# Patient Record
Sex: MALE | Race: WHITE | NOT HISPANIC OR LATINO | Employment: OTHER | URBAN - METROPOLITAN AREA
[De-identification: names, ages, dates, MRNs, and addresses within clinical notes are randomized per-mention and may not be internally consistent; named-entity substitution may affect disease eponyms.]

---

## 2017-01-04 ENCOUNTER — TELEPHONE (OUTPATIENT)
Dept: ENDOCRINOLOGY | Age: 52
End: 2017-01-04

## 2017-01-30 ENCOUNTER — TELEPHONE (OUTPATIENT)
Dept: ENDOCRINOLOGY | Age: 52
End: 2017-01-30

## 2017-01-30 ENCOUNTER — OFFICE VISIT (OUTPATIENT)
Dept: ENDOCRINOLOGY | Age: 52
End: 2017-01-30

## 2017-01-30 VITALS
HEIGHT: 73 IN | SYSTOLIC BLOOD PRESSURE: 130 MMHG | BODY MASS INDEX: 39.42 KG/M2 | DIASTOLIC BLOOD PRESSURE: 74 MMHG | WEIGHT: 297.4 LBS

## 2017-01-30 DIAGNOSIS — E29.1 HYPOGONADISM IN MALE: ICD-10-CM

## 2017-01-30 DIAGNOSIS — E78.00 HYPERCHOLESTEROLEMIA: ICD-10-CM

## 2017-01-30 DIAGNOSIS — R79.89 LOW TESTOSTERONE: ICD-10-CM

## 2017-01-30 DIAGNOSIS — E78.5 HYPERLIPIDEMIA, UNSPECIFIED HYPERLIPIDEMIA TYPE: ICD-10-CM

## 2017-01-30 DIAGNOSIS — E03.9 PRIMARY HYPOTHYROIDISM: Primary | ICD-10-CM

## 2017-01-30 PROCEDURE — 99214 OFFICE O/P EST MOD 30 MIN: CPT | Performed by: NURSE PRACTITIONER

## 2017-01-30 RX ORDER — GLIPIZIDE 5 MG/1
5 TABLET ORAL DAILY
Qty: 30 TABLET | Refills: 5 | Status: SHIPPED | OUTPATIENT
Start: 2017-01-30 | End: 2017-03-17 | Stop reason: SDUPTHER

## 2017-01-30 RX ORDER — PEN NEEDLE, DIABETIC 29 G X1/2"
NEEDLE, DISPOSABLE MISCELLANEOUS
COMMUNITY
Start: 2017-01-26 | End: 2017-08-07 | Stop reason: SDUPTHER

## 2017-01-30 RX ORDER — LEVOTHYROXINE SODIUM 0.07 MG/1
75 TABLET ORAL DAILY
COMMUNITY
Start: 2017-01-05 | End: 2017-06-06

## 2017-01-30 RX ORDER — TESTOSTERONE 16.2 MG/G
GEL TRANSDERMAL
COMMUNITY
Start: 2017-01-02 | End: 2017-02-03 | Stop reason: SDUPTHER

## 2017-01-30 NOTE — MR AVS SNAPSHOT
Chetan Grimm   1/30/2017 9:00 AM   Office Visit    Dept Phone:  [phone]   Encounter #:  [id number]    Provider:  DEREK Kapoor   Department:  [facility] ENDOCRINOLOGY                Your Full Care Plan              Today's Medication Changes          These changes are accurate as of: 1/30/17  9:51 AM.  If you have any questions, ask your nurse or doctor.               New Medication(s)Ordered:     glucagon 1 MG injection   Commonly known as:  GLUCAGON EMERGENCY   Inject 1 mg under the skin 1 (One) Time As Needed for low blood sugar for up to 1 dose.   Started by:  DEREK Kapoor         Medication(s)that have changed:     ANDROGEL PUMP 20.25 MG/ACT (1.62%) gel   Generic drug:  Testosterone   1 pump to each shoulder daily   What changed:  Another medication with the same name was removed. Continue taking this medication, and follow the directions you see here.   Changed by:  DEREK Kapoor       glipiZIDE 5 MG tablet   Commonly known as:  GLUCOTROL   Take 1 tablet by mouth Daily.   What changed:  when to take this   Changed by:  DEREK Kapoor       levothyroxine 75 MCG tablet   Commonly known as:  SYNTHROID, LEVOTHROID   Take 75 mcg by mouth Daily.   What changed:  Another medication with the same name was removed. Continue taking this medication, and follow the directions you see here.   Changed by:  DEREK Kapoor            Where to Get Your Medications      These medications were sent to [facility] [address] [place] [address] [id number] [place] [address] - [phone]  - [phone] [place] [address]     Phone:  [phone]     Dapagliflozin-Metformin HCl ER 5-1000 MG tablet sustained-release 24 hour    glipiZIDE 5 MG tablet    glucagon 1 MG injection                  Your Updated Medication List          This list is accurate as of: 1/30/17  9:51 AM.  Always use your most recent med list.                albuterol 108 (90 BASE) MCG/ACT inhaler   Commonly known as:  PROVENTIL HFA;VENTOLIN HFA   Inhale 2 puffs every 4 (four) hours as needed for wheezing.       ANDROGEL PUMP 20.25 MG/ACT (1.62%) gel   Generic drug:  Testosterone       aspirin 81 MG chewable tablet       atorvastatin 20 MG tablet   Commonly known as:  LIPITOR   Take 1 tablet by mouth Daily.       cyclobenzaprine 10 MG tablet   Commonly known as:  FLEXERIL       Dapagliflozin-Metformin HCl ER 5-1000 MG tablet sustained-release 24 hour   Commonly known as:  XIGDUO XR   Take 2 tablets by mouth Daily.       ergocalciferol 85653 UNITS capsule   Commonly known as:  DRISDOL   Take 1 po q week       freestyle lancets   Use to test BG 4 times daily       glipiZIDE 5 MG tablet   Commonly known as:  GLUCOTROL   Take 1 tablet by mouth Daily.       glucagon 1 MG injection   Commonly known as:  GLUCAGON EMERGENCY   Inject 1 mg under the skin 1 (One) Time As Needed for low blood sugar for up to 1 dose.       glucose blood test strip   Commonly known as:  FREESTYLE LITE   Use to test 4 times daily       Insulin Glargine 100 UNIT/ML injection pen   Commonly known as:  LANTUS SOLOSTAR   Inject 50 Units under the skin Daily.       l-methylfolate-B6-B12 3-35-2 MG tablet tablet   Take 1 tablet by mouth 2 (Two) Times a Day.       levothyroxine 75 MCG tablet   Commonly known as:  SYNTHROID, LEVOTHROID       lisinopril 5 MG tablet   Commonly known as:  PRINIVIL,ZESTRIL       mupirocin 2 % ointment   Commonly known as:  BACTROBAN       RELION PEN NEEDLES 29G X 12MM misc   Generic drug:  Insulin Pen Needle       TANZEUM 50 MG pen-injector   Generic drug:  Albiglutide   Inject 1 dose under the skin 1 (One) Time Per Week.               You Were Diagnosed With        Codes Comments    Primary hypothyroidism    -  Primary ICD-10-CM: E03.9  ICD-9-CM: 244.9     Hyperlipidemia, unspecified hyperlipidemia type     ICD-10-CM: E78.5  ICD-9-CM: 272.4     Vitamin D deficiency      ICD-10-CM: E55.9  ICD-9-CM: 268.9     Low testosterone     ICD-10-CM: E29.1  ICD-9-CM: 257.2     Hypogonadism in male     ICD-10-CM: E29.1  ICD-9-CM: 257.2     Hypercholesterolemia     ICD-10-CM: E78.00  ICD-9-CM: 272.0       Medications to be Given to You by a Medical Professional     Due       Frequency    (none) nitroglycerin (NITROSTAT) SL tablet 0.4 mg  Every 5 Minutes PRN      Instructions    Decrease glipizide to 5mg once daily   Glucagon kit sent to pharmacy for emergency   Schedule an appt with PCP  Continue to monitor BS. Notify the office if you continue to have high blood sugars or if you have low blood sugars so your medications can be adjusted   Derm referral from pcp     Patient Instructions History      Upcoming Appointments     Visit Type Date Time Department    OFFICE VISIT 2017  9:00 AM [facility]    FOLLOW UP SLEEP CLINIC 2017  2:30 PM [facility]    [facility] 2017  8:10 AM [facility]    OFFICE VISIT 2017  2:20 PM [facility]      [employer] Signup     [employer] allows you to send messages to your doctor, view your test results, renew your prescriptions, schedule appointments, and more. To sign up, go to [employer] and click on the Sign Up Now link in the New User? box. Enter your [employer] Activation Code exactly as it appears below along with the last four digits of your Social Security Number and your Date of Birth () to complete the sign-up process. If you do not sign up before the expiration date, you must request a new code.    [employer] Activation Code: [email or web address]  Expires: 2017  9:49 AM    If you have questions, you can email [email or web address] or call [phone] to talk to our [employer] staff. Remember, [employer] is NOT to be used for urgent needs. For medical emergencies, dial 911.               Other Info from Your Visit           Your Appointments     2017  2:30 PM EST   Follow Up Sleep  "Clinic with  [facility] PROVIDER SCHEDULE   [facility]    [address] [facility] [address] [phone]            1.  If you are currently on a CPAP or BiPAP please bring in your SD card or memory card.  2.  If you are experiencing problems with your current mask, please bring your mask with you to your appointment.              May 23, 2017  8:10 AM EDT   [facility]   [facility] ENDOCR[facility] (--)    [address] [address] [phone]            Jun 06, 2017  2:20 PM EDT   Office Visit with DEREK Kapoor   [facility] ENDOCRINOLOG[facility] (--)    [address] [address] [phone]           Arrive 15 minutes prior to appointment.              Allergies     Adhesive Tape        Reason for Visit     Diabetes patient experiencing a rash and low BG ranges 52  to 62    Hypothyroidism pt is not taking metanx    Hypogonadism           Vital Signs     Blood Pressure Height Weight Body Mass Index Smoking Status       130/74 73\" (185.4 cm) 297 lb 6.4 oz (135 kg) 39.24 kg/m2 Never Smoker       Problems and Diagnoses Noted     High cholesterol    High cholesterol or triglycerides    Hypogonadism in male    Low testosterone    Primary hypothyroidism    Vitamin D deficiency        "

## 2017-01-30 NOTE — PROGRESS NOTES
"Poppy Grimm is a 51 y.o. male is here today for follow-up.  Chief Complaint   Patient presents with   • Diabetes     patient experiencing a rash and low BG ranges 52  to 62   • Hypothyroidism     pt is not taking metanx   • Hypogonadism     Visit Vitals   • /74   • Ht 73\" (185.4 cm)   • Wt 297 lb 6.4 oz (135 kg)   • BMI 39.24 kg/m2       Current Outpatient Prescriptions:   •  albuterol (PROVENTIL HFA;VENTOLIN HFA) 108 (90 BASE) MCG/ACT inhaler, Inhale 2 puffs every 4 (four) hours as needed for wheezing., Disp: 1 inhaler, Rfl: 0  •  ANDROGEL PUMP 20.25 MG/ACT (1.62%) gel, 1 pump to each shoulder daily, Disp: , Rfl:   •  aspirin 81 MG chewable tablet, Chew 81 mg daily., Disp: , Rfl:   •  atorvastatin (LIPITOR) 20 MG tablet, Take 1 tablet by mouth Daily., Disp: 90 tablet, Rfl: 1  •  cyclobenzaprine (FLEXERIL) 10 MG tablet, Take 10 mg by mouth 2 (two) times a day as needed., Disp: , Rfl:   •  Dapagliflozin-Metformin HCl ER (XIGDUO XR) 5-1000 MG tablet sustained-release 24 hour, Take 2 tablets by mouth Daily., Disp: 180 tablet, Rfl: 1  •  ergocalciferol (DRISDOL) 05490 UNITS capsule, Take 1 po q week, Disp: 12 capsule, Rfl: 1  •  glipiZIDE (GLUCOTROL) 5 MG tablet, Take 1 tablet by mouth 2 (Two) Times a Day., Disp: 60 tablet, Rfl: 4  •  glucose blood (FREESTYLE LITE) test strip, Use to test 4 times daily, Disp: 200 each, Rfl: 5  •  Insulin Glargine (LANTUS SOLOSTAR) 100 UNIT/ML injection pen, Inject 50 Units under the skin Daily., Disp: 30 mL, Rfl: 5  •  Lancets (FREESTYLE) lancets, Use to test BG 4 times daily, Disp: 200 each, Rfl: 5  •  levothyroxine (SYNTHROID, LEVOTHROID) 75 MCG tablet, Take 75 mcg by mouth Daily., Disp: , Rfl:   •  lisinopril (PRINIVIL,ZESTRIL) 5 MG tablet, Take 5 mg by mouth every night., Disp: , Rfl:   •  mupirocin (BACTROBAN) 2 % ointment, Apply  topically 3 (three) times a day., Disp: , Rfl:   •  RELION PEN NEEDLES 29G X 12MM misc, Use to inject insulin 2 times, Disp: , " Rfl:   •  TANZEUM 50 MG pen-injector, Inject 1 dose under the skin 1 (One) Time Per Week., Disp: 4 each, Rfl: 5  •  l-methylfolate-B6-B12 (METANX) 3-35-2 MG tablet tablet, Take 1 tablet by mouth 2 (Two) Times a Day., Disp: 180 tablet, Rfl: 3    Current Facility-Administered Medications:   •  nitroglycerin (NITROSTAT) SL tablet 0.4 mg, 0.4 mg, Sublingual, Q5 Min PRN, Edmond Gordon MD  Social History     Social History   • Marital status:      Spouse name: N/A   • Number of children: N/A   • Years of education: N/A     Social History Main Topics   • Smoking status: Never Smoker   • Smokeless tobacco: None   • Alcohol use No   • Drug use: No   • Sexual activity: Defer     Other Topics Concern   • None     Social History Narrative     Family History   Problem Relation Age of Onset   • Heart disease Mother    • Hypertension Mother    • Hypertension Father    • Heart disease Father          History of Present Illness   Encounter Diagnoses   Name Primary?   • Hyperlipidemia, unspecified hyperlipidemia type    • Vitamin D deficiency    • Primary hypothyroidism Yes   • Low testosterone    • Hypogonadism in male    • Hypercholesterolemia        This is a 52yo male pt being seen today for an acute follow-up on hypolgycemia and a rash. He has been waking up in the middle of the night around 2-3am with BS in the 50s. He had been having problems with his new insurance plan getting xigduo filled. Previously he was on tanzeum, glipizide 5mg bid and lantus 50U qhs and was having BS in the 200s. Once he got xigduo filled then he started having hypoglycemia in the middle the night.  He wakes up diaphoretic.  He treats his sugars with orange juice or chocolate milk but states this does not seem to correct his blood sugar quickly enough.  States that he has to eat so much to correct his blood sugar that it makes him nauseous.  He does not have a glucagon kit at home.  He states that his schedule varies because he is a   and travels around the [place].  He does eat dinner however he does not eat a bedtime snack consistently since he isn't having as low blood sugar he started giving himself 20 units of Lantus at night.  He brought his blood sugars on his phone which were reviewed.  He is currently on AndroGel for low testosterone and he does not notice a significant difference. He also presents today with a rash on his right lower extremity.  He states that this is going on for over a year and states his PCP told him it was due to his ibuprofen and Tylenol therapy is using to treat chronic pain.  However he states that once he stopped using these medications not resolved.  He does not have a PCP currently because of his new insurance plan and is asking for referrals today. He states he feels good on his current dose of his thyroid medication.       The following portions of the patient's history were reviewed and updated as appropriate: allergies, current medications, past family history, past medical history, past social history, past surgical history and problem list.    Review of Systems   Constitutional: Negative for chills.   HENT: Negative for drooling.    Eyes: Negative for photophobia.   Respiratory: Negative for cough.    Cardiovascular: Negative for leg swelling.   Gastrointestinal: Negative for anal bleeding.   Endocrine: Negative for polydipsia.   Genitourinary: Negative for genital sores.   Musculoskeletal: Negative for myalgias.   Skin: Negative for color change.   Allergic/Immunologic: Negative for food allergies.   Neurological: Negative for syncope.   Hematological: Negative for adenopathy.   Psychiatric/Behavioral: Negative for dysphoric mood.       Objective   Physical Exam   Constitutional: He is oriented to person, place, and time. He appears well-developed and well-nourished. No distress.   HENT:   Head: Normocephalic and atraumatic.   Right Ear: External ear normal.   Left Ear: External ear normal.    Nose: Nose normal.   Eyes: Pupils are equal, round, and reactive to light. Right eye exhibits no discharge. Left eye exhibits no discharge.   Neck: Normal range of motion. Neck supple. Carotid bruit is not present. No tracheal deviation, no edema and no erythema present. No thyromegaly present.   Cardiovascular: Normal rate, regular rhythm, normal heart sounds and intact distal pulses.  Exam reveals no gallop and no friction rub.    No murmur heard.  Pulmonary/Chest: Effort normal and breath sounds normal. No respiratory distress. He has no wheezes. He has no rales.   Abdominal: Soft. Bowel sounds are normal. He exhibits no distension. There is no tenderness.   Musculoskeletal: Normal range of motion. He exhibits no edema or deformity.   Lymphadenopathy:     He has no cervical adenopathy.   Neurological: He is alert and oriented to person, place, and time. Coordination normal.   Skin: Skin is warm and dry. No rash noted. He is not diaphoretic. No erythema. No pallor.   Psychiatric: He has a normal mood and affect. His behavior is normal. Judgment and thought content normal.   Nursing note and vitals reviewed.    Results for orders placed or performed in visit on 12/23/16   TestT+TestF+SHBG   Result Value Ref Range    Testosterone, Total 336 (L) 348 - 1197 ng/dL    Comment Comment     Testosterone, Free 7.6 7.2 - 24.0 pg/mL    Sex Hormone Binding Globulin 29.0 19.3 - 76.4 nmol/L     Lab Results   Component Value Date    HGBA1C 12.09 (H) 12/07/2016     Lab Results   Component Value Date    GLUCOSE 168 (H) 07/20/2016    BUN 10 12/07/2016    CREATININE 0.93 12/07/2016    EGFRIFNONA 86 12/07/2016    EGFRIFAFRI 104 12/07/2016    BCR 10.8 12/07/2016    CO2 26.6 12/07/2016    CALCIUM 9.6 12/07/2016    PROTENTOTREF 7.0 12/07/2016    ALBUMIN 4.20 12/07/2016    LABIL2 1.5 12/07/2016    AST 16 12/07/2016    ALT 22 12/07/2016     No results found for: CHOL  Lab Results   Component Value Date    TRIG 143 12/07/2016    TRIG 234  (H) 01/19/2015     Lab Results   Component Value Date    HDL 34 (L) 12/07/2016    HDL 21 (L) 01/19/2015     No results found for: LDLCALC  Lab Results   Component Value Date     (H) 12/07/2016     01/19/2015     No results found for: HDLLDLRATIO  No components found for: CHOLHDL]    Assessment/Plan   Chetan was seen today for diabetes, hypothyroidism and hypogonadism.    Diagnoses and all orders for this visit:    Primary hypothyroidism    Hyperlipidemia, unspecified hyperlipidemia type    Vitamin D deficiency    Low testosterone    Other orders  -     glucagon (GLUCAGON EMERGENCY) 1 MG injection; Inject 1 mg under the skin 1 (One) Time As Needed for low blood sugar for up to 1 dose.      In summary, patient was seen and examined.  Due to his frequent low blood sugars in the middle the night, I have decrease his glipizide to 5 mg once daily in the morning.  He is to continue all other current medications as prescribed.  He is to continue closely monitoring his blood sugar is to notify the office if he continues to have hypoglycemia symptoms and further adjust his medications accordingly.  A prescription was sent to his pharmacy for a glucagon kit.  I instructed him and his wife to review the medication and instructions so that they could adequately prepared in case of an emergency.  We had a long talk regarding how do treat his hypoglycemia with the rule of 15.  We have provided him with a list of PCPs and he is to make an appointment and discuss his rash and possible derm referral. He is to follow up in 4 months with labs prior. I have asked that he contact the office with any concerns or concerns prior to his next visit.

## 2017-01-30 NOTE — PATIENT INSTRUCTIONS
Decrease glipizide to 5mg once daily in am with food. Stop glipizide with evening meal.   Resubmit xigduo under new insurance to see if covered.   Glucagon kit sent to pharmacy for emergency   Schedule an appt with PCP  Continue to monitor BS. Notify the office if you continue to have high blood sugars or if you have low blood sugars so your medications can be adjusted   Derm referral from pcp

## 2017-02-03 RX ORDER — TESTOSTERONE 16.2 MG/G
GEL TRANSDERMAL
Qty: 75 G | Refills: 0 | Status: SHIPPED | OUTPATIENT
Start: 2017-02-03 | End: 2017-03-07 | Stop reason: SDUPTHER

## 2017-02-03 RX ORDER — TESTOSTERONE 16.2 MG/G
GEL TRANSDERMAL
Refills: 0 | Status: CANCELLED | OUTPATIENT
Start: 2017-02-03

## 2017-02-13 ENCOUNTER — APPOINTMENT (OUTPATIENT)
Dept: SLEEP MEDICINE | Facility: HOSPITAL | Age: 52
End: 2017-02-13

## 2017-02-27 ENCOUNTER — HOSPITAL ENCOUNTER (OUTPATIENT)
Dept: SLEEP MEDICINE | Facility: HOSPITAL | Age: 52
Discharge: HOME | End: 2017-02-27
Admitting: INTERNAL MEDICINE

## 2017-02-27 PROCEDURE — G0463 HOSPITAL OUTPT CLINIC VISIT: HCPCS

## 2017-02-28 ENCOUNTER — TELEPHONE (OUTPATIENT)
Dept: ENDOCRINOLOGY | Age: 52
End: 2017-02-28

## 2017-02-28 NOTE — PROGRESS NOTES
DATE OF VISIT: 02/27/2017    PROBLEM LIST:  1.  Mild obstructive sleep apnea, on CPAP.  2.  Excessive daytime sleepiness, better on CPAP.  3.  Obesity.  4.  Hypertension.  5.  Hyperlipidemia.  6.  Hypothyroidism.    HISTORY OF PRESENT ILLNESS: The patient is here for followup. The patient was last seen by Dr. Sullivan 08/24/2016. He was diagnosed with obstructive sleep apnea in 2008 and has had good compliance, but his machine was broken and he needed new supplies. He had a repeat sleep study done on 10/03/2016 that showed an AHI of 10.9, PLMD 26.9, with arousal index of 2.1. He has lost 70 pounds since 2008 study, but not much since 10/2016 when he had this new study completed. He has been using his CPAP every night and feels more rested with his machine. His ESS score is still elevated at 18. He also has a history of hypertension and coronary artery disease, status post PTCA and stent to the LAD. He has been on CPAP at a pressure of 13.     MEDICATIONS:  1.  Xigduo 5/1000 takes 2 tabs daily.  2.  Glipizide 5 mg b.i.d.   3.  Levothyroxine 75 mcg daily.  4.  Atorvastatin 20 mg daily.  5.  Lisinopril 5 mg daily.  6.  Vitamin D 2000 mg daily.  7.  Aspirin 81 mg daily.  8.  AndroGel 75 g 2 pumps daily.   9.  Lantus 50 units daily.  10.  Tanzeum 50 mg weekly.    SOCIAL HISTORY: No change since last visit.    FAMILY HISTORY: No change since last visit.    REVIEW OF SYSTEMS: A 10-point review of systems was negative. Please refer to followup sleep questionnaire page 1 for full list of symptoms reviewed.       PHYSICAL EXAMINATION:   VITAL SIGNS: Height 6 feet 1 inch, weight 297 pounds, BMI 39, blood pressure 123/71, heart rate 81.   GENERAL: Awake, alert, and oriented. No distress.  HEENT: PERRL. Moist mucous membranes, midline. Mallampati IV airway.   NECK: No bruit. No adenopathy.   LUNGS: Clear to auscultation bilaterally, nonlabored.   HEART: Regular rate and rhythm, no murmur.   MUSCULOSKELETAL: Normal  gait.  EXTREMITIES: Trace bilateral lower extremity edema.   SKIN: He does have rash diffuse over both of his legs.    DIAGNOSTIC DATA: Sleep study 10/03/2016 with AHI of 10.9, PLMD of 26.9, with arousal index of 2.1. Compliance download 02/27/2017 shows 100% compliance on CPAP of 13, with a residual AHI of 2 with 1 minute 38 second large air leak, with usage time of 9 hours and 12 minutes.      ASSESSMENT:   1.  Mild obstructive sleep apnea, on CPAP.   2.  Excessive daytime sleepiness, better on CPAP but needs a new mask. ESS score of 18, which is increased from previous ESS of 6 .  3.  Obesity.  4.  Hypertension.  5.  Hyperlipidemia.   6.  Hypothyroidism.    PLAN: Will reorder auto CPAP at 12-20 with a new machine and followup in 2 months to decide on further titration needs.     DEREK Jessica, dictating for Jaclyn Berkowitz MD.        DEREK Jessica  WF:co  D:   02/27/2017 15:31:23  T:   02/28/2017 03:37:54  Job ID:   [id number]  Document ID:   [id number]  cc:

## 2017-03-01 ENCOUNTER — OFFICE (OUTPATIENT)
Dept: URBAN - METROPOLITAN AREA CLINIC 75 | Facility: CLINIC | Age: 52
End: 2017-03-01
Payer: COMMERCIAL

## 2017-03-01 VITALS
SYSTOLIC BLOOD PRESSURE: 126 MMHG | WEIGHT: 293 LBS | DIASTOLIC BLOOD PRESSURE: 84 MMHG | HEIGHT: 73 IN | HEART RATE: 78 BPM

## 2017-03-01 DIAGNOSIS — Z12.11 ENCOUNTER FOR SCREENING FOR MALIGNANT NEOPLASM OF COLON: ICD-10-CM

## 2017-03-01 DIAGNOSIS — R19.5 OTHER FECAL ABNORMALITIES: ICD-10-CM

## 2017-03-01 PROCEDURE — 99204 OFFICE O/P NEW MOD 45 MIN: CPT | Performed by: INTERNAL MEDICINE

## 2017-03-07 RX ORDER — TESTOSTERONE 16.2 MG/G
GEL TRANSDERMAL
Refills: 0 | Status: CANCELLED | OUTPATIENT
Start: 2017-03-07

## 2017-03-07 RX ORDER — TESTOSTERONE 16.2 MG/G
GEL TRANSDERMAL
Qty: 75 G | Refills: 0 | OUTPATIENT
Start: 2017-03-07 | End: 2017-04-03 | Stop reason: SDUPTHER

## 2017-03-14 ENCOUNTER — TELEPHONE (OUTPATIENT)
Dept: ENDOCRINOLOGY | Age: 52
End: 2017-03-14

## 2017-03-14 NOTE — TELEPHONE ENCOUNTER
Called pt l v/m regarding needing to stop the 3rd xigduo and that as soon as Domonique receives the labs we will then make changes to medications.

## 2017-03-15 ENCOUNTER — TELEPHONE (OUTPATIENT)
Dept: ENDOCRINOLOGY | Age: 52
End: 2017-03-15

## 2017-03-15 NOTE — TELEPHONE ENCOUNTER
Pt ci wanting to knw about his labs he had faxed over from his PCP office. Informed him we have not received any labs. Pt stated he was on the phone w pcp office yest as they were faxing them. Informed him I would contact the office again & have them refax them and put attn Keturah on the fax so i can make sure i receive it. Pt provided me w PCP & contact #  Dr. Vanessa Jennings [phone]

## 2017-03-17 ENCOUNTER — TELEPHONE (OUTPATIENT)
Dept: ENDOCRINOLOGY | Age: 52
End: 2017-03-17

## 2017-03-17 RX ORDER — GLIPIZIDE 5 MG/1
5 TABLET ORAL
Qty: 60 TABLET | Refills: 5 | Status: SHIPPED | OUTPATIENT
Start: 2017-03-17 | End: 2017-11-09 | Stop reason: SDUPTHER

## 2017-03-21 ENCOUNTER — APPOINTMENT (OUTPATIENT)
Dept: GENERAL RADIOLOGY | Facility: HOSPITAL | Age: 52
End: 2017-03-21

## 2017-03-21 LAB
ALBUMIN SERPL-MCNC: 4.1 G/DL (ref 3.5–5.2)
ALBUMIN/GLOB SERPL: 1.4 G/DL
ALP SERPL-CCNC: 90 U/L (ref 39–117)
ALT SERPL W P-5'-P-CCNC: 23 U/L (ref 1–41)
ANION GAP SERPL CALCULATED.3IONS-SCNC: 15.7 MMOL/L
AST SERPL-CCNC: 16 U/L (ref 1–40)
BASOPHILS # BLD AUTO: 0.02 10*3/MM3 (ref 0–0.2)
BASOPHILS NFR BLD AUTO: 0.2 % (ref 0–1.5)
BILIRUB SERPL-MCNC: 0.5 MG/DL (ref 0.1–1.2)
BUN BLD-MCNC: 12 MG/DL (ref 6–20)
BUN/CREAT SERPL: 15.8 (ref 7–25)
CALCIUM SPEC-SCNC: 9.2 MG/DL (ref 8.6–10.5)
CHLORIDE SERPL-SCNC: 101 MMOL/L (ref 98–107)
CO2 SERPL-SCNC: 23.3 MMOL/L (ref 22–29)
CREAT BLD-MCNC: 0.76 MG/DL (ref 0.76–1.27)
DEPRECATED RDW RBC AUTO: 44.2 FL (ref 37–54)
EOSINOPHIL # BLD AUTO: 0.87 10*3/MM3 (ref 0–0.7)
EOSINOPHIL NFR BLD AUTO: 10 % (ref 0.3–6.2)
ERYTHROCYTE [DISTWIDTH] IN BLOOD BY AUTOMATED COUNT: 13.4 % (ref 11.5–14.5)
GFR SERPL CREATININE-BSD FRML MDRD: 108 ML/MIN/1.73
GLOBULIN UR ELPH-MCNC: 2.9 GM/DL
GLUCOSE BLD-MCNC: 155 MG/DL (ref 65–99)
HCT VFR BLD AUTO: 44.2 % (ref 40.4–52.2)
HGB BLD-MCNC: 15 G/DL (ref 13.7–17.6)
IMM GRANULOCYTES # BLD: 0.03 10*3/MM3 (ref 0–0.03)
IMM GRANULOCYTES NFR BLD: 0.3 % (ref 0–0.5)
LYMPHOCYTES # BLD AUTO: 1.95 10*3/MM3 (ref 0.9–4.8)
LYMPHOCYTES NFR BLD AUTO: 22.3 % (ref 19.6–45.3)
MAGNESIUM SERPL-MCNC: 1.8 MG/DL (ref 1.6–2.6)
MCH RBC QN AUTO: 31 PG (ref 27–32.7)
MCHC RBC AUTO-ENTMCNC: 33.9 G/DL (ref 32.6–36.4)
MCV RBC AUTO: 91.3 FL (ref 79.8–96.2)
MONOCYTES # BLD AUTO: 0.42 10*3/MM3 (ref 0.2–1.2)
MONOCYTES NFR BLD AUTO: 4.8 % (ref 5–12)
NEUTROPHILS # BLD AUTO: 5.45 10*3/MM3 (ref 1.9–8.1)
NEUTROPHILS NFR BLD AUTO: 62.4 % (ref 42.7–76)
PLATELET # BLD AUTO: 249 10*3/MM3 (ref 140–500)
PMV BLD AUTO: 10.6 FL (ref 6–12)
POTASSIUM BLD-SCNC: 3.7 MMOL/L (ref 3.5–5.2)
PROT SERPL-MCNC: 7 G/DL (ref 6–8.5)
RBC # BLD AUTO: 4.84 10*6/MM3 (ref 4.6–6)
SODIUM BLD-SCNC: 140 MMOL/L (ref 136–145)
TROPONIN T SERPL-MCNC: <0.01 NG/ML (ref 0–0.03)
WBC NRBC COR # BLD: 8.74 10*3/MM3 (ref 4.5–10.7)

## 2017-03-21 PROCEDURE — 99284 EMERGENCY DEPT VISIT MOD MDM: CPT

## 2017-03-21 PROCEDURE — 80053 COMPREHEN METABOLIC PANEL: CPT | Performed by: EMERGENCY MEDICINE

## 2017-03-21 PROCEDURE — 71010 HC CHEST PA OR AP: CPT

## 2017-03-21 PROCEDURE — 93005 ELECTROCARDIOGRAM TRACING: CPT | Performed by: EMERGENCY MEDICINE

## 2017-03-21 PROCEDURE — 85025 COMPLETE CBC W/AUTO DIFF WBC: CPT | Performed by: EMERGENCY MEDICINE

## 2017-03-21 PROCEDURE — 36415 COLL VENOUS BLD VENIPUNCTURE: CPT

## 2017-03-21 PROCEDURE — 83735 ASSAY OF MAGNESIUM: CPT | Performed by: EMERGENCY MEDICINE

## 2017-03-21 PROCEDURE — 84484 ASSAY OF TROPONIN QUANT: CPT | Performed by: EMERGENCY MEDICINE

## 2017-03-21 RX ORDER — SODIUM CHLORIDE 0.9 % (FLUSH) 0.9 %
10 SYRINGE (ML) INJECTION AS NEEDED
Status: DISCONTINUED | OUTPATIENT
Start: 2017-03-21 | End: 2017-03-22 | Stop reason: HOSPADM

## 2017-03-22 ENCOUNTER — HOSPITAL ENCOUNTER (EMERGENCY)
Facility: HOSPITAL | Age: 52
Discharge: HOME | End: 2017-03-22
Attending: EMERGENCY MEDICINE | Admitting: EMERGENCY MEDICINE

## 2017-03-22 ENCOUNTER — APPOINTMENT (OUTPATIENT)
Dept: CT IMAGING | Facility: HOSPITAL | Age: 52
End: 2017-03-22

## 2017-03-22 VITALS
SYSTOLIC BLOOD PRESSURE: 117 MMHG | TEMPERATURE: 97.2 F | DIASTOLIC BLOOD PRESSURE: 77 MMHG | WEIGHT: 295 LBS | HEIGHT: 73 IN | OXYGEN SATURATION: 93 % | HEART RATE: 82 BPM | BODY MASS INDEX: 39.1 KG/M2 | RESPIRATION RATE: 16 BRPM

## 2017-03-22 DIAGNOSIS — R42 DIZZINESS: Primary | ICD-10-CM

## 2017-03-22 DIAGNOSIS — R51.9 ACUTE NONINTRACTABLE HEADACHE, UNSPECIFIED HEADACHE TYPE: ICD-10-CM

## 2017-03-22 LAB
BILIRUB UR QL STRIP: NEGATIVE
CLARITY UR: CLEAR
COLOR UR: YELLOW
GLUCOSE UR STRIP-MCNC: ABNORMAL MG/DL
HGB UR QL STRIP.AUTO: NEGATIVE
HOLD SPECIMEN: NORMAL
HOLD SPECIMEN: NORMAL
KETONES UR QL STRIP: ABNORMAL
LEUKOCYTE ESTERASE UR QL STRIP.AUTO: NEGATIVE
NITRITE UR QL STRIP: NEGATIVE
PH UR STRIP.AUTO: 5.5 [PH] (ref 5–8)
PROT UR QL STRIP: NEGATIVE
SP GR UR STRIP: >=1.03 (ref 1–1.03)
UROBILINOGEN UR QL STRIP: ABNORMAL
WHOLE BLOOD HOLD SPECIMEN: NORMAL
WHOLE BLOOD HOLD SPECIMEN: NORMAL

## 2017-03-22 PROCEDURE — 93010 ELECTROCARDIOGRAM REPORT: CPT | Performed by: INTERNAL MEDICINE

## 2017-03-22 PROCEDURE — 81003 URINALYSIS AUTO W/O SCOPE: CPT | Performed by: EMERGENCY MEDICINE

## 2017-03-22 PROCEDURE — 70450 CT HEAD/BRAIN W/O DYE: CPT

## 2017-03-22 RX ORDER — MECLIZINE HYDROCHLORIDE 25 MG/1
25 TABLET ORAL ONCE
Status: COMPLETED | OUTPATIENT
Start: 2017-03-22 | End: 2017-03-22

## 2017-03-22 RX ORDER — PROMETHAZINE HYDROCHLORIDE 25 MG/1
25 TABLET ORAL EVERY 6 HOURS PRN
Qty: 12 TABLET | Refills: 0 | Status: SHIPPED | OUTPATIENT
Start: 2017-03-22 | End: 2017-06-06

## 2017-03-22 RX ADMIN — MECLIZINE HYDROCHLORIDE 25 MG: 25 TABLET ORAL at 02:00

## 2017-03-22 NOTE — DISCHARGE INSTRUCTIONS
Take medication as prescribed.  Take ibuprofen or Naprosyn as needed for headache.  Follow-up with your doctor in the next 1-2 days.  Return to the emergent return for worsening symptoms, vomiting, difficult walking, focal numbness or weakness, or other concern.

## 2017-03-22 NOTE — ED PROVIDER NOTES
" EMERGENCY DEPARTMENT ENCOUNTER    CHIEF COMPLAINT  Chief Complaint: Headache  History given by: patient   History limited by: n/a  Room Number: 23/23  PMD: Vanessa Jennings MD      HPI:  Pt is a 51 y.o. male who presents complaining of a headache that has been constant for the last 3 days. Pt also complains of constant dizziness, which he describes as an off balance sensation. The dizziness is not affected by movement. Pt states that he has episodes of nausea and diaphoresis (\"clammy\" sensation) similar to a hypoglycemic episode, but reports normal BG. He denies vomiting, tinnitus, chest pain, SOA, or ear pain. Pt states that his sx are similar to previous TIA's. Hx of diabetes and TIA's.     Duration:  3 days   Onset: gradual  Timing: constant  Location: generalized head  Radiation: n/a  Quality: aching  Intensity/Severity: moderate   Progression: worse today  Associated Symptoms: nausea, diaphoresis, dizziness  Aggravating Factors: none  Alleviating Factors: none  Previous Episodes: hx of TIA's  Treatment before arrival: none    PAST MEDICAL HISTORY  Active Ambulatory Problems     Diagnosis Date Noted   • Diabetes mellitus 05/31/2016   • Hypersomnia    • ANNEL (obstructive sleep apnea)    • Hypercholesterolemia 11/12/2012   • Primary hypothyroidism 12/07/2016   • Hyperlipidemia 12/07/2016   • Chronic pain 12/07/2016   • Low testosterone 12/19/2016   • Vitamin D deficiency 12/19/2016   • Hypogonadism in male 12/26/2016     Resolved Ambulatory Problems     Diagnosis Date Noted   • No Resolved Ambulatory Problems     Past Medical History:   Diagnosis Date   • Coronary artery disease    • Cryptococcal pneumonitis 2010   • Diabetes mellitus    • Disease of thyroid gland    • Hyperlipidemia    • Hypothyroidism    • ANNEL on CPAP    • Pulmonary nodule    • Testosterone deficiency    • Type 2 diabetes mellitus    • Vitamin D deficiency        PAST SURGICAL HISTORY  Past Surgical History:   Procedure Laterality Date   • " ANTERIOR CERVICAL CORPECTOMY W/ FUSION  2006    C2-3   • ANTERIOR CERVICAL DISCECTOMY W/ FUSION  2012    C2-T2 PREVIOUS HARDWARE REMOVED AND REVISED   • ANTERIOR CERVICAL FUSION  2008    C3-5   • CARDIAC CATHETERIZATION N/A 6/16/2016    Procedure: Coronary angiography;  Surgeon: Emiliano Gordon MD;  Location:  [facility];  Service:    • CARDIAC CATHETERIZATION N/A 6/16/2016    Procedure: Left Heart Cath;  Surgeon: Emiliano Gordon MD;  Location:  [facility];  Service:    • CARDIAC CATHETERIZATION N/A 6/16/2016    Procedure: Left ventriculography;  Surgeon: Emiliano Gordon MD;  Location:  [facility];  Service:    • CORONARY ANGIOPLASTY  01/2015   • CORONARY STENT PLACEMENT     • HERNIA REPAIR     • KNEE ARTHROSCOPY         FAMILY HISTORY  Family History   Problem Relation Age of Onset   • Heart disease Mother    • Hypertension Mother    • Hypertension Father    • Heart disease Father        SOCIAL HISTORY  Social History     Social History   • Marital status:      Spouse name: N/A   • Number of children: N/A   • Years of education: N/A     Occupational History   • Not on file.     Social History Main Topics   • Smoking status: Never Smoker   • Smokeless tobacco: Not on file   • Alcohol use No   • Drug use: No   • Sexual activity: Defer     Other Topics Concern   • Not on file     Social History Narrative       ALLERGIES  Adhesive tape    REVIEW OF SYSTEMS  Review of Systems   Constitutional: Positive for diaphoresis (clammy sensation). Negative for chills and fever.   HENT: Negative for congestion and sore throat.    Eyes: Negative.    Respiratory: Negative for cough and shortness of breath.    Cardiovascular: Negative for chest pain and leg swelling.   Gastrointestinal: Positive for nausea. Negative for abdominal pain, diarrhea and vomiting.   Genitourinary: Negative for difficulty urinating and dysuria.   Musculoskeletal: Negative for  back pain and neck pain.   Skin: Negative for rash and wound.   Allergic/Immunologic: Negative.    Neurological: Positive for dizziness (off balance) and headaches. Negative for weakness and numbness.   Psychiatric/Behavioral: Negative.    All other systems reviewed and are negative.      PHYSICAL EXAM  ED Triage Vitals   Temp Heart Rate Resp BP SpO2   03/21/17 2223 03/21/17 2223 03/21/17 2223 03/21/17 2228 03/21/17 2223   97.2 °F (36.2 °C) 98 18 150/89 96 %      Temp src Heart Rate Source Patient Position BP Location FiO2 (%)   03/21/17 2223 03/21/17 2223 03/21/17 2228 03/21/17 2228 --   Tympanic Monitor Sitting Left arm        Physical Exam   Constitutional: He is oriented to person, place, and time and well-developed, well-nourished, and in no distress.   HENT:   Head: Normocephalic and atraumatic.   Right Ear: Tympanic membrane normal.   Left Ear: Tympanic membrane normal.   Eyes: EOM are normal. Pupils are equal, round, and reactive to light. Right eye exhibits no nystagmus. Left eye exhibits no nystagmus.   Neck: Normal range of motion. Neck supple. Carotid bruit is not present.   Cardiovascular: Normal rate, regular rhythm and normal heart sounds.    Pulmonary/Chest: Effort normal and breath sounds normal. No respiratory distress.   Abdominal: Soft. There is no tenderness. There is no rebound and no guarding.   Musculoskeletal: Normal range of motion. He exhibits no edema.   Neurological: He is alert and oriented to person, place, and time. He has normal sensation and normal strength. He has a normal Finger-Nose-Finger Test and a normal Heel to [place] Test.   Skin: Skin is warm and dry.   Psychiatric: Mood and affect normal.   Nursing note and vitals reviewed.      LAB RESULTS  Lab Results (last 24 hours)     Procedure Component Value Units Date/Time    CBC & Differential [[id number]] Collected:  03/21/17 2245    Specimen:  Blood Updated:  03/21/17 2316    Narrative:       The following orders were created for  panel order CBC & Differential.  Procedure                               Abnormality         Status                     ---------                               -----------         ------                     CBC Auto Differential[[id number]]         Abnormal            Final result                 Please view results for these tests on the individual orders.    Comprehensive Metabolic Panel [[id number]]  (Abnormal) Collected:  03/21/17 2245    Specimen:  Blood from Arm, Left Updated:  03/21/17 2338     Glucose 155 (H) mg/dL      BUN 12 mg/dL      Creatinine 0.76 mg/dL      Sodium 140 mmol/L      Potassium 3.7 mmol/L      Chloride 101 mmol/L      CO2 23.3 mmol/L      Calcium 9.2 mg/dL      Total Protein 7.0 g/dL      Albumin 4.10 g/dL      ALT (SGPT) 23 U/L      AST (SGOT) 16 U/L      Alkaline Phosphatase 90 U/L      Total Bilirubin 0.5 mg/dL      eGFR Non African Amer 108 mL/min/1.73      Globulin 2.9 gm/dL      A/G Ratio 1.4 g/dL      BUN/Creatinine Ratio 15.8     Anion Gap 15.7 mmol/L     Troponin [[id number]]  (Normal) Collected:  03/21/17 2245    Specimen:  Blood from Arm, Left Updated:  03/21/17 2338     Troponin T <0.010 ng/mL     Narrative:       Troponin T Reference Ranges:  Less than 0.03 ng/mL:    Negative for AMI  0.03 to 0.09 ng/mL:      Indeterminant for AMI  Greater than 0.09 ng/mL: Positive for AMI    Magnesium [[id number]]  (Normal) Collected:  03/21/17 2245    Specimen:  Blood from Arm, Left Updated:  03/21/17 2338     Magnesium 1.8 mg/dL     CBC Auto Differential [[id number]]  (Abnormal) Collected:  03/21/17 2245    Specimen:  Blood from Arm, Left Updated:  03/21/17 2316     WBC 8.74 10*3/mm3      RBC 4.84 10*6/mm3      Hemoglobin 15.0 g/dL      Hematocrit 44.2 %      MCV 91.3 fL      MCH 31.0 pg      MCHC 33.9 g/dL      RDW 13.4 %      RDW-SD 44.2 fl      MPV 10.6 fL      Platelets 249 10*3/mm3      Neutrophil % 62.4 %      Lymphocyte % 22.3 %      Monocyte % 4.8 (L) %      Eosinophil % 10.0 (H) %       Basophil % 0.2 %      Immature Grans % 0.3 %      Neutrophils, Absolute 5.45 10*3/mm3      Lymphocytes, Absolute 1.95 10*3/mm3      Monocytes, Absolute 0.42 10*3/mm3      Eosinophils, Absolute 0.87 (H) 10*3/mm3      Basophils, Absolute 0.02 10*3/mm3      Immature Grans, Absolute 0.03 10*3/mm3     Urinalysis With / Culture If Indicated [[id number]]  (Abnormal) Collected:  03/22/17 0011    Specimen:  Urine from Urine, Clean Catch Updated:  03/22/17 0029     Color, UA Yellow     Appearance, UA Clear     pH, UA 5.5     Specific Gravity, UA >=1.030     Glucose, UA >=1000 mg/dL (3+) (A)     Ketones, UA Trace (A)     Bilirubin, UA Negative     Blood, UA Negative     Protein, UA Negative     Leuk Esterase, UA Negative     Nitrite, UA Negative     Urobilinogen, UA 0.2 E.U./dL    Narrative:       Urine microscopic not indicated.          I ordered the above labs and reviewed the results    RADIOLOGY  XR Chest 1 View   Preliminary Result   No acute findings. Multiple left lung nodules.          CT Head Without Contrast   Preliminary Result   No definite acute intracranial pathology.                                 I ordered the above noted radiological studies. Interpreted by radiologist. Discussed with radiologist. Reviewed by me in PACS.       PROCEDURES  Procedures    EKG           EKG time: 00:33  Rhythm/Rate: NSR 79  P waves and [place]: nml  QRS, axis: LAD, RBBB   ST and T waves: non specific T wave changes      Interpreted Contemporaneously by me, independently viewed  Unchanged compared to prior 7/2016      PROGRESS AND CONSULTS  ED Course     22:32  Labs, CXR, and EKG ordered for further evaluation.     00:45  BP- 135/94 HR- 76 Temp- 97.2 °F (36.2 °C) (Tympanic) O2 sat- 98%  Advised pt of the plan for a CT head for further evaluation. Pt understands and agrees with the plan, all questions answered.    00:44  Ct head ordered for further evaluation.     01:47  BP- 135/94 HR- 76 Temp- 97.2 °F (36.2 °C) (Tympanic) O2 sat-  98%  Rechecked the patient who is in NAD and is resting comfortably. Advised pt that the CT head shows NAD. Offered pt inpatient TIA workup versus outpatient f/u. Pt elects to f/u as an outpatient. RTER given for worsening pain, focal weakness, or other concerns. Pt will be discharged. Pt understands and agrees with the plan, all questions answered.    MEDICAL DECISION MAKING  Results were reviewed/discussed with the patient and they were also made aware of online access. Pt also made aware that some labs, such as cultures, will not be resulted during ER visit and follow up with PMD is necessary.     MDM  Number of Diagnoses or Management Options  Acute nonintractable headache, unspecified headache type:   Dizziness:   Diagnosis management comments: Patient presented the ER complaining of a constant headache for the past 3 days.  He also reports feeling dizzy.  Nothing seems to make this better or worse.  He denied any chest pain, focal numbness or weakness, or fever.  His head CT was unremarkable.  EKG was unchanged.  Labs were normal except for mildly elevated glucose.  Patient was given meclizine in the ER.  The patient was offered admission for further workup due to his history of coronary artery disease, diabetes and previous TIA.  He declined and preferred to be treated as an outpatient.  Patient was advised to follow-up with his primary care doctor in the next 1-2 days.  He has had an echocardiogram, brain MRI, and cardiac catheterization within the past year--all of which were unremarkable. His neuro exam was normal.  His symptoms are not suggestive of a stroke.        Amount and/or Complexity of Data Reviewed  Clinical lab tests: reviewed and ordered (Troponin is <0.010)  Tests in the radiology section of CPT®: ordered and reviewed (CXR shows NAD  CT head shows NAD)  Tests in the medicine section of CPT®: ordered and reviewed (See EKG procedure note. )  Decide to obtain previous medical records or to obtain  history from someone other than the patient: yes  Review and summarize past medical records: yes (Pt had a cardiac cath in June of 2016 which showed stable coranary anatomy and patent stent in the proximal LAD. Admitted in 2/2016 secondary to syncope. EEG, MRI brain, and tilt test showed NAD at that time.   Echo in June 2016 that shows normal LV function, no valvula disease. )  Independent visualization of images, tracings, or specimens: yes    Patient Progress  Patient progress: stable         DIAGNOSIS  Final diagnoses:   Dizziness   Acute nonintractable headache, unspecified headache type       DISPOSITION  DISCHARGE    Patient discharged in stable condition.    Reviewed implications of results, diagnosis, meds, responsibility to follow up, warning signs and symptoms of possible worsening, potential complications and reasons to return to ER.    Patient/Family voiced understanding of above instructions.    Discussed plan for discharge, as there is no emergent indication for admission.  Pt/family is agreeable and understands need for follow up and repeat testing.  Pt is aware that discharge does not mean that nothing is wrong but it indicates no emergency is present that requires admission and they must continue care with follow-up as given below or physician of their choice.     FOLLOW-UP  Vanessa Jennings MD  [facility] [address]  [phone]    In 2 days           Medication List      New Prescriptions          promethazine 25 MG tablet   Commonly known as:  PHENERGAN   Take 1 tablet by mouth Every 6 (Six) Hours As Needed for Nausea   (dizziness).           Latest Documented Vital Signs:  As of 4:10 AM  BP- 117/77 HR- 82 Temp- 97.2 °F (36.2 °C) (Tympanic) O2 sat- 93%    --  Documentation assistance provided by melvin Martinez for Dr Antoine.  Information recorded by the melvin was done at my direction and has been verified and validated by me.          Jie Martinez  03/22/17  0156       Ritesh Antoine MD  03/22/17 0410

## 2017-03-29 ENCOUNTER — ON CAMPUS - OUTPATIENT (OUTPATIENT)
Dept: URBAN - METROPOLITAN AREA HOSPITAL 108 | Facility: HOSPITAL | Age: 52
End: 2017-03-29
Payer: COMMERCIAL

## 2017-03-29 DIAGNOSIS — K63.5 POLYP OF COLON: ICD-10-CM

## 2017-03-29 DIAGNOSIS — K64.8 OTHER HEMORRHOIDS: ICD-10-CM

## 2017-03-29 DIAGNOSIS — R19.5 OTHER FECAL ABNORMALITIES: ICD-10-CM

## 2017-03-29 PROCEDURE — 45385 COLONOSCOPY W/LESION REMOVAL: CPT | Performed by: INTERNAL MEDICINE

## 2017-04-02 RX ORDER — TESTOSTERONE 16.2 MG/G
GEL TRANSDERMAL
Refills: 0 | Status: CANCELLED | OUTPATIENT
Start: 2017-04-02

## 2017-04-03 RX ORDER — TESTOSTERONE 16.2 MG/G
GEL TRANSDERMAL
Qty: 75 G | Refills: 0 | OUTPATIENT
Start: 2017-04-03 | End: 2017-05-18 | Stop reason: SDUPTHER

## 2017-04-17 ENCOUNTER — HOSPITAL ENCOUNTER (OUTPATIENT)
Dept: SLEEP MEDICINE | Facility: HOSPITAL | Age: 52
Discharge: HOME | End: 2017-04-17
Admitting: INTERNAL MEDICINE

## 2017-04-17 PROCEDURE — G0463 HOSPITAL OUTPT CLINIC VISIT: HCPCS

## 2017-04-18 NOTE — PROGRESS NOTES
FOLLOWUP NOTE    DATE OF SERVICE: 04/17/2017    PROBLEM LIST:  1. Obstructive sleep apnea well controlled on CPAP.   2. Hypersomnia.   3. Obesity.   4. Hypertension.     HISTORY OF PRESENT ILLNESS: Chetan Grimm is a 51-year-old male who was last seen on 02/27/2017 for mild obstructive sleep apnea on CPAP with excessive daytime sleepiness. He also has a history of obesity and hypertension. His [place] Sleepiness Scale was previously 18 and is now down to 9. He was previously on CPAP at 13 cm of water, which was changed to auto 12 to 20 and he has obtained a new mask. He reports that he is feeling much better and has no complaints with mask and no problems with air leak. He currently has a fall facemask, reports that it fits well and does have some dry mouth occasionally. He uses [employer] for equipment and supplies. He does have plans for bilateral hand, shoulder and neck surgery in the near future.     REVIEW OF SYSTEMS: A 10-point review of system was performed and was negative.     PAST MEDICAL HISTORY: There is no change in medical, surgical, social, or family history except as mentioned above, and planned surgeries for the future.     MEDICATIONS:  1. Xigduo 1000 mg b.i.d.   2. Glipizide 5 mg b.i.d.   3. Levothyroxine 75 mcg daily.   4. Flexeril 10 mg b.i.d.   5. Vitamin D 50,000 units weekly.   6. Aspirin 81 mg daily.   7. AndroGel 75 g b.i.d.   8. Lantus 50 units daily.   9. Tanzeum 50 mg weekly.     ALLERGIES: ADHESIVE TAPE.     PHYSICAL EXAMINATION: Done and documented per sleep disorder physical examination sheet.   VITAL SIGNS: Height 72 inches, weight 297 pounds which is stable from last visit with a BMI of 40. Blood pressure 111/76, heart rate of 76.   GENERAL: Awake, alert, and oriented. Normal mood and normal affect.   HEENT: Normal conjunctiva. PERRLA. Moist mucous membranes. Midline septum. Large tonsils. Mallampati IV airway.   LUNGS: Nonlabored. Clear to auscultation bilaterally.   HEART: Regular  rate and rhythm. No murmurs. Normal S1, S2.   EXTREMITIES: No edema. No clubbing, no cyanosis. Normal gait.     DIAGNOSTIC DATA: Sleep study on 10/03/2016 showed an AHI of 10.9.     Compliance download 04/17/2017 shows 100% compliance with average use of 9 hours and 22 minutes with a residual AHI of 2.0 on auto CPAP 12 to 20 with a large air leak of 1 minute and 44 seconds. The mean pressure is 15 with a peak pressure of 16.7, and 90% of the time at pressure of 16.9 cm of water.     ASSESSMENT:  1. Obstructive sleep apnea well controlled on CPAP.   2. Hypersomnia with excessive daytime sleepiness that has resolved with ESS from 18 that is now down to 9.   3. Obesity that has been stable.   4. Hypertension, well controlled and followed by primary care physician.     RECOMMENDATIONS:  1. The patient has good compliance and is well controlled on auto CPAP 12 to 20 with significant improvement in excessive daytime sleepiness. We will continue at current settings of auto 12 to 20.   2. Follow up in 1 year.    DEREK Jessica, dictating for MD Mamta Cleaning APRN  WF:sami  D:   04/17/2017 14:50:26  T:   04/17/2017 22:39:04  Job ID:   [id number]  Document ID:   [id number]  cc:

## 2017-05-03 ENCOUNTER — OFFICE (OUTPATIENT)
Dept: URBAN - METROPOLITAN AREA CLINIC 75 | Facility: CLINIC | Age: 52
End: 2017-05-03
Payer: COMMERCIAL

## 2017-05-03 VITALS — HEIGHT: 73 IN

## 2017-05-03 DIAGNOSIS — R19.5 OTHER FECAL ABNORMALITIES: ICD-10-CM

## 2017-05-03 DIAGNOSIS — K64.1 SECOND DEGREE HEMORRHOIDS: ICD-10-CM

## 2017-05-03 PROBLEM — K64.9 HEMORRHOIDS, UNSPECIFIED: Status: ACTIVE | Noted: 2017-05-03

## 2017-05-03 PROCEDURE — 46221 LIGATION OF HEMORRHOID(S): CPT | Performed by: INTERNAL MEDICINE

## 2017-05-03 NOTE — SERVICEHPINOTES
The patient presents with symptomatic grade   2  internal hemorrhoids , unresponsive to maximal medical therapy, requesting rubber band ligation of   his   hemorrhoidal disease.

## 2017-05-08 DIAGNOSIS — E11.65 TYPE 2 DIABETES MELLITUS WITH HYPERGLYCEMIA, UNSPECIFIED LONG TERM INSULIN USE STATUS: Primary | ICD-10-CM

## 2017-05-08 DIAGNOSIS — E55.9 VITAMIN D DEFICIENCY: ICD-10-CM

## 2017-05-08 DIAGNOSIS — E29.1 HYPOGONADISM IN MALE: ICD-10-CM

## 2017-05-08 DIAGNOSIS — E03.9 PRIMARY HYPOTHYROIDISM: ICD-10-CM

## 2017-05-08 PROBLEM — I25.10 CHRONIC CORONARY ARTERY DISEASE: Status: ACTIVE | Noted: 2017-03-29

## 2017-05-18 RX ORDER — TESTOSTERONE 16.2 MG/G
GEL TRANSDERMAL
Qty: 75 G | Refills: 0 | OUTPATIENT
Start: 2017-05-18 | End: 2017-05-23 | Stop reason: SDUPTHER

## 2017-05-23 ENCOUNTER — LAB (OUTPATIENT)
Dept: ENDOCRINOLOGY | Age: 52
End: 2017-05-23

## 2017-05-23 DIAGNOSIS — E29.1 HYPOGONADISM IN MALE: ICD-10-CM

## 2017-05-23 DIAGNOSIS — E11.65 TYPE 2 DIABETES MELLITUS WITH HYPERGLYCEMIA, UNSPECIFIED LONG TERM INSULIN USE STATUS: ICD-10-CM

## 2017-05-23 DIAGNOSIS — E03.9 PRIMARY HYPOTHYROIDISM: ICD-10-CM

## 2017-05-23 DIAGNOSIS — E55.9 VITAMIN D DEFICIENCY: ICD-10-CM

## 2017-05-23 RX ORDER — TESTOSTERONE 16.2 MG/G
GEL TRANSDERMAL
Qty: 75 G | Refills: 0 | OUTPATIENT
Start: 2017-05-23 | End: 2017-06-06 | Stop reason: SDUPTHER

## 2017-05-29 LAB
25(OH)D3+25(OH)D2 SERPL-MCNC: 24.1 NG/ML (ref 30–100)
ALBUMIN SERPL-MCNC: 4.1 G/DL (ref 3.5–5.2)
ALBUMIN/GLOB SERPL: 1.3 G/DL
ALP SERPL-CCNC: 91 U/L (ref 39–117)
ALT SERPL-CCNC: 25 U/L (ref 1–41)
AST SERPL-CCNC: 19 U/L (ref 1–40)
BILIRUB SERPL-MCNC: 0.8 MG/DL (ref 0.1–1.2)
BUN SERPL-MCNC: 15 MG/DL (ref 6–20)
BUN/CREAT SERPL: 17.2 (ref 7–25)
C PEPTIDE SERPL-MCNC: 4.2 NG/ML (ref 1.1–4.4)
CALCIUM SERPL-MCNC: 9.7 MG/DL (ref 8.6–10.5)
CHLORIDE SERPL-SCNC: 101 MMOL/L (ref 98–107)
CHOLEST SERPL-MCNC: 167 MG/DL (ref 0–200)
CO2 SERPL-SCNC: 20.9 MMOL/L (ref 22–29)
CONV COMMENT: ABNORMAL
CREAT SERPL-MCNC: 0.87 MG/DL (ref 0.76–1.27)
FT4I SERPL CALC-MCNC: 2 (ref 1.2–4.9)
GLOBULIN SER CALC-MCNC: 3.2 GM/DL
GLUCOSE SERPL-MCNC: 126 MG/DL (ref 65–99)
HBA1C MFR BLD: 5.99 % (ref 4.8–5.6)
HCT VFR BLD AUTO: 48.1 % (ref 40.4–52.2)
HDLC SERPL-MCNC: 27 MG/DL (ref 40–60)
HGB BLD-MCNC: 16.6 G/DL (ref 13.7–17.6)
LDLC SERPL CALC-MCNC: 84 MG/DL (ref 0–100)
MICROALBUMIN UR-MCNC: <3 UG/ML
POTASSIUM SERPL-SCNC: 4 MMOL/L (ref 3.5–5.2)
PROT SERPL-MCNC: 7.3 G/DL (ref 6–8.5)
PSA SERPL-MCNC: 0.7 NG/ML (ref 0–4)
SHBG SERPL-SCNC: 28.4 NMOL/L (ref 19.3–76.4)
SODIUM SERPL-SCNC: 139 MMOL/L (ref 136–145)
T3FREE SERPL-MCNC: 3 PG/ML (ref 2–4.4)
T3RU NFR SERPL: 31 % (ref 24–39)
T4 FREE SERPL-MCNC: 1.29 NG/DL (ref 0.93–1.7)
T4 SERPL-MCNC: 6.4 UG/DL (ref 4.5–12)
TESTOST FREE SERPL-MCNC: 5.7 PG/ML (ref 7.2–24)
TESTOST SERPL-MCNC: 247 NG/DL (ref 348–1197)
THYROGLOB AB SERPL-ACNC: 17 IU/ML
THYROGLOB SERPL-MCNC: <2 NG/ML
THYROGLOB SERPL-MCNC: ABNORMAL NG/ML
TRIGL SERPL-MCNC: 282 MG/DL (ref 0–150)
TSH SERPL DL<=0.005 MIU/L-ACNC: 8.72 UIU/ML (ref 0.45–4.5)
VLDLC SERPL CALC-MCNC: 56.4 MG/DL (ref 5–40)

## 2017-06-01 RX ORDER — ERGOCALCIFEROL 1.25 MG/1
CAPSULE ORAL
Qty: 12 CAPSULE | Refills: 0 | Status: SHIPPED | OUTPATIENT
Start: 2017-06-01 | End: 2017-06-06 | Stop reason: SDUPTHER

## 2017-06-01 RX ORDER — ERGOCALCIFEROL 1.25 MG/1
CAPSULE ORAL
Qty: 12 CAPSULE | Refills: 0 | Status: CANCELLED | OUTPATIENT
Start: 2017-06-01

## 2017-06-06 ENCOUNTER — OFFICE VISIT (OUTPATIENT)
Dept: ENDOCRINOLOGY | Age: 52
End: 2017-06-06

## 2017-06-06 VITALS
SYSTOLIC BLOOD PRESSURE: 128 MMHG | BODY MASS INDEX: 38.32 KG/M2 | DIASTOLIC BLOOD PRESSURE: 78 MMHG | WEIGHT: 298.6 LBS | HEIGHT: 74 IN

## 2017-06-06 DIAGNOSIS — Z79.4 TYPE 2 DIABETES MELLITUS WITH OTHER SPECIFIED COMPLICATION, WITH LONG-TERM CURRENT USE OF INSULIN (HCC): ICD-10-CM

## 2017-06-06 DIAGNOSIS — E55.9 VITAMIN D DEFICIENCY: ICD-10-CM

## 2017-06-06 DIAGNOSIS — E11.69 TYPE 2 DIABETES MELLITUS WITH OTHER SPECIFIED COMPLICATION, WITH LONG-TERM CURRENT USE OF INSULIN (HCC): ICD-10-CM

## 2017-06-06 DIAGNOSIS — E78.00 HYPERCHOLESTEROLEMIA: ICD-10-CM

## 2017-06-06 DIAGNOSIS — R79.89 LOW TESTOSTERONE: ICD-10-CM

## 2017-06-06 DIAGNOSIS — E29.1 HYPOGONADISM IN MALE: ICD-10-CM

## 2017-06-06 DIAGNOSIS — E78.5 HYPERLIPIDEMIA, UNSPECIFIED HYPERLIPIDEMIA TYPE: ICD-10-CM

## 2017-06-06 DIAGNOSIS — E03.9 PRIMARY HYPOTHYROIDISM: Primary | ICD-10-CM

## 2017-06-06 PROCEDURE — 99214 OFFICE O/P EST MOD 30 MIN: CPT | Performed by: NURSE PRACTITIONER

## 2017-06-06 RX ORDER — LEVOTHYROXINE SODIUM 0.1 MG/1
100 TABLET ORAL DAILY
Qty: 30 TABLET | Refills: 5 | Status: SHIPPED | OUTPATIENT
Start: 2017-06-06 | End: 2017-11-08 | Stop reason: SDUPTHER

## 2017-06-06 RX ORDER — ERGOCALCIFEROL 1.25 MG/1
CAPSULE ORAL
Qty: 24 CAPSULE | Refills: 1 | Status: SHIPPED | OUTPATIENT
Start: 2017-06-06 | End: 2017-11-13 | Stop reason: SDUPTHER

## 2017-06-06 RX ORDER — INSULIN DEGLUDEC 200 U/ML
45 INJECTION, SOLUTION SUBCUTANEOUS DAILY
Qty: 3 PEN | Refills: 5 | Status: SHIPPED | OUTPATIENT
Start: 2017-06-06 | End: 2017-06-06 | Stop reason: SDUPTHER

## 2017-06-06 RX ORDER — INSULIN DEGLUDEC 200 U/ML
46 INJECTION, SOLUTION SUBCUTANEOUS DAILY
Qty: 3 PEN | Refills: 5 | Status: SHIPPED | OUTPATIENT
Start: 2017-06-06 | End: 2017-11-09 | Stop reason: SDUPTHER

## 2017-06-06 RX ORDER — CLOTRIMAZOLE AND BETAMETHASONE DIPROPIONATE 10; .64 MG/G; MG/G
CREAM TOPICAL
COMMUNITY
Start: 2017-02-27 | End: 2018-04-27 | Stop reason: ALTCHOICE

## 2017-06-06 RX ORDER — HYDROCODONE BITARTRATE AND ACETAMINOPHEN 5; 325 MG/1; MG/1
1 TABLET ORAL AS NEEDED
COMMUNITY
Start: 2017-05-31 | End: 2018-04-27 | Stop reason: ALTCHOICE

## 2017-06-06 RX ORDER — TESTOSTERONE 16.2 MG/G
GEL TRANSDERMAL
Qty: 150 G | Refills: 0 | Status: SHIPPED | OUTPATIENT
Start: 2017-06-06 | End: 2017-08-07

## 2017-06-06 NOTE — PROGRESS NOTES
"Subjective   Chetan Grimm is a 51 y.o. male is here today for follow-up.  Chief Complaint   Patient presents with   • Diabetes     recent labs, testing BG at least two times daily, brought meter   • Hypothyroidism     pt is not taking metanx   • Hypogonadism   • Vitamin D Deficiency     /78  Ht 73.5\" (186.7 cm)  Wt 298 lb 9.6 oz (135 kg)  BMI 38.86 kg/m2  Current Outpatient Prescriptions on File Prior to Visit   Medication Sig   • aspirin 81 MG chewable tablet Chew 81 mg daily.   • atorvastatin (LIPITOR) 20 MG tablet Take 1 tablet by mouth Daily.   • cyclobenzaprine (FLEXERIL) 10 MG tablet Take 10 mg by mouth 2 (two) times a day as needed.   • Dapagliflozin-Metformin HCl ER (XIGDUO XR) 5-1000 MG tablet sustained-release 24 hour Take 2 tablets by mouth Daily.   • glipiZIDE (GLUCOTROL) 5 MG tablet Take 1 tablet by mouth 2 (Two) Times a Day Before Meals.   • glucagon (GLUCAGON EMERGENCY) 1 MG injection Inject 1 mg under the skin 1 (One) Time As Needed for low blood sugar for up to 1 dose.   • glucose blood (FREESTYLE LITE) test strip Use to test 4 times daily   • Lancets (FREESTYLE) lancets Use to test BG 4 times daily   • lisinopril (PRINIVIL,ZESTRIL) 5 MG tablet Take 5 mg by mouth every night.   • mupirocin (BACTROBAN) 2 % ointment Apply  topically 3 (three) times a day.   • RELION PEN NEEDLES 29G X 12MM misc Use to inject insulin 2 times   • TANZEUM 50 MG pen-injector Inject 1 dose under the skin 1 (One) Time Per Week.   • [DISCONTINUED] ANDROGEL PUMP 20.25 MG/ACT (1.62%) gel 1 pump to each shoulder daily   • [DISCONTINUED] ergocalciferol (DRISDOL) 25841 UNITS capsule Take 1 po q week   • [DISCONTINUED] Insulin Glargine (LANTUS SOLOSTAR) 100 UNIT/ML injection pen Inject 50 Units under the skin Daily.   • [DISCONTINUED] levothyroxine (SYNTHROID, LEVOTHROID) 75 MCG tablet Take 75 mcg by mouth Daily.   • [DISCONTINUED] albuterol (PROVENTIL HFA;VENTOLIN HFA) 108 (90 BASE) MCG/ACT inhaler Inhale 2 puffs every " 4 (four) hours as needed for wheezing.   • [DISCONTINUED] l-methylfolate-B6-B12 (METANX) 3-35-2 MG tablet tablet Take 1 tablet by mouth 2 (Two) Times a Day.   • [DISCONTINUED] promethazine (PHENERGAN) 25 MG tablet Take 1 tablet by mouth Every 6 (Six) Hours As Needed for Nausea (dizziness).     Current Facility-Administered Medications on File Prior to Visit   Medication   • nitroglycerin (NITROSTAT) SL tablet 0.4 mg     Family History   Problem Relation Age of Onset   • Heart disease Mother    • Hypertension Mother    • Hypertension Father    • Heart disease Father      Social History   Substance Use Topics   • Smoking status: Never Smoker   • Smokeless tobacco: None   • Alcohol use No     Allergies   Allergen Reactions   • Adhesive Tape          History of Present Illness  Encounter Diagnoses   Name Primary?   • Primary hypothyroidism Yes   • Vitamin D deficiency    • Hyperlipidemia, unspecified hyperlipidemia type    • Hypercholesterolemia    • Type 2 diabetes mellitus with other specified complication, with long-term current use of insulin    • Hypogonadism in male    • Low testosterone    This is a 51-year-old male patient here today for routine follow-up visit for the above-mentioned problems.  He is coming by his wife to today's visit.  He had recent labs which were reviewed and he was provided a copy.  He has recent surgery on both hands for carpal tunnel.  He has a history of a fall which caused nerve damage.  He has recovered well from his left hand however he is still having problems recovering with the right hand is still has a lot of pain.  He is taking his medications as prescribed.  He does complain of fatigue.  He states his insurance will no longer cover Lantus and he is needing to change insulin due to formulary coverage.    The following portions of the patient's history were reviewed and updated as appropriate: allergies, current medications, past family history, past medical history, past social  history, past surgical history and problem list.    Review of Systems   Constitutional: Positive for fatigue.   HENT: Negative for trouble swallowing.    Eyes: Negative for visual disturbance.   Respiratory: Negative for shortness of breath.    Cardiovascular: Negative for leg swelling.   Endocrine: Negative for polyphagia.   Skin: Negative for wound.   Neurological: Negative for numbness.        Pain in both hands from nerve damage and carpal tunnel       Objective   Physical Exam   Constitutional: He is oriented to person, place, and time. He appears well-developed and well-nourished. No distress.   HENT:   Head: Normocephalic and atraumatic.   Right Ear: External ear normal.   Left Ear: External ear normal.   Nose: Nose normal.   Eyes: Pupils are equal, round, and reactive to light. Right eye exhibits no discharge. Left eye exhibits no discharge.   Neck: Normal range of motion. Neck supple. Carotid bruit is not present. No tracheal deviation, no edema and no erythema present. No thyromegaly present.   Cardiovascular: Normal rate, regular rhythm, normal heart sounds and intact distal pulses.  Exam reveals no gallop and no friction rub.    No murmur heard.  Pulmonary/Chest: Effort normal and breath sounds normal. No respiratory distress. He has no wheezes. He has no rales.   Abdominal: Soft. Bowel sounds are normal. He exhibits no distension. There is no tenderness.   Musculoskeletal: Normal range of motion. He exhibits no edema or deformity.   Lymphadenopathy:     He has no cervical adenopathy.   Neurological: He is alert and oriented to person, place, and time. Coordination normal.   Skin: Skin is warm and dry. No rash noted. He is not diaphoretic. No erythema. No pallor.   Psychiatric: He has a normal mood and affect. His behavior is normal. Judgment and thought content normal.   Nursing note and vitals reviewed.        Assessment/Plan   Chetan was seen today for diabetes, hypothyroidism, hypogonadism and  vitamin d deficiency.    Diagnoses and all orders for this visit:    Primary hypothyroidism    Vitamin D deficiency    Hyperlipidemia, unspecified hyperlipidemia type    Hypercholesterolemia    Type 2 diabetes mellitus with other specified complication, with long-term current use of insulin    Hypogonadism in male    Low testosterone    Other orders  -     ergocalciferol (DRISDOL) 80092 UNITS capsule; Take 1 po twice q week  -     Discontinue: TRESIBA FLEXTOUCH 200 UNIT/ML solution pen-injector; Inject 45 Units under the skin Daily.  -     levothyroxine (SYNTHROID) 100 MCG tablet; Take 1 tablet by mouth Daily.  -     ANDROGEL PUMP 20.25 MG/ACT (1.62%) gel; 2 pump to each shoulder daily  -     TRESIBA FLEXTOUCH 200 UNIT/ML solution pen-injector; Inject 46 Units under the skin Daily.    In summary, patient was seen and examined.  His recent labs were reviewed and he was provided a copy.  Vitamin D as been increased to 1 capsule twice weekly.  I've increased his AndroGel to 2 pumps each shoulder daily based on his recent testosterone level.  His TSH level was elevated for this I've increased his levothyroxine to 100 µg daily on empty stomach.  Patient was instructed in his medication changes.  He was provided a sample of tresiba with instructions to start 46 units once daily.  He will need to monitor his blood sugars routinely and has been advised to contact the office if his blood sugars start trending too high or if he has hypoglycemic events.  He is to follow-up in 4 months with labs prior.  I've encouraged him to call the office should he have any questions or concerns prior to then.    Increase levothyroxine to 100 mcg daily on empty stomach  Increase androgel to 2 pumps each shoulder daily  Change lantus to tresiba 200 46 units once daily   Vitamin d 50k twice weekly      Current Outpatient Prescriptions:   •  ANDROGEL PUMP 20.25 MG/ACT (1.62%) gel, 2 pump to each shoulder daily, Disp: 150 g, Rfl: 0  •  aspirin  81 MG chewable tablet, Chew 81 mg daily., Disp: , Rfl:   •  atorvastatin (LIPITOR) 20 MG tablet, Take 1 tablet by mouth Daily., Disp: 90 tablet, Rfl: 1  •  clotrimazole-betamethasone (LOTRISONE) 1-0.05 % cream, , Disp: , Rfl:   •  cyclobenzaprine (FLEXERIL) 10 MG tablet, Take 10 mg by mouth 2 (two) times a day as needed., Disp: , Rfl:   •  Dapagliflozin-Metformin HCl ER (XIGDUO XR) 5-1000 MG tablet sustained-release 24 hour, Take 2 tablets by mouth Daily., Disp: 60 tablet, Rfl: 5  •  ergocalciferol (DRISDOL) 93393 UNITS capsule, Take 1 po twice q week, Disp: 24 capsule, Rfl: 1  •  glipiZIDE (GLUCOTROL) 5 MG tablet, Take 1 tablet by mouth 2 (Two) Times a Day Before Meals., Disp: 60 tablet, Rfl: 5  •  glucagon (GLUCAGON EMERGENCY) 1 MG injection, Inject 1 mg under the skin 1 (One) Time As Needed for low blood sugar for up to 1 dose., Disp: 1 kit, Rfl: 5  •  glucose blood (FREESTYLE LITE) test strip, Use to test 4 times daily, Disp: 200 each, Rfl: 5  •  HYDROcodone-acetaminophen (NORCO) 5-325 MG per tablet, Take 1 tablet by mouth As Needed., Disp: , Rfl:   •  Lancets (FREESTYLE) lancets, Use to test BG 4 times daily, Disp: 200 each, Rfl: 5  •  levothyroxine (SYNTHROID) 100 MCG tablet, Take 1 tablet by mouth Daily., Disp: 30 tablet, Rfl: 5  •  lisinopril (PRINIVIL,ZESTRIL) 5 MG tablet, Take 5 mg by mouth every night., Disp: , Rfl:   •  mupirocin (BACTROBAN) 2 % ointment, Apply  topically 3 (three) times a day., Disp: , Rfl:   •  RELION PEN NEEDLES 29G X 12MM misc, Use to inject insulin 2 times, Disp: , Rfl:   •  TANZEUM 50 MG pen-injector, Inject 1 dose under the skin 1 (One) Time Per Week., Disp: 4 each, Rfl: 5  •  TRESIBA FLEXTOUCH 200 UNIT/ML solution pen-injector, Inject 46 Units under the skin Daily., Disp: 3 pen, Rfl: 5    Current Facility-Administered Medications:   •  nitroglycerin (NITROSTAT) SL tablet 0.4 mg, 0.4 mg, Sublingual, Q5 Min PRN, Edmond Gordon MD

## 2017-06-06 NOTE — PATIENT INSTRUCTIONS
Increase levothyroxine to 100 mcg daily on empty stomach  Increase androgel to 2 pumps each shoulder daily  Change lantus to tresiba 200 46 units once daily   Vitamin d 50k twice weekly

## 2017-08-07 RX ORDER — TESTOSTERONE 12.5 MG/1.25G
100 GEL TOPICAL DAILY
Qty: 60 PACKAGE | Refills: 0 | OUTPATIENT
Start: 2017-08-07 | End: 2017-08-07 | Stop reason: SDUPTHER

## 2017-08-07 RX ORDER — PEN NEEDLE, DIABETIC 29 G X1/2"
NEEDLE, DISPOSABLE MISCELLANEOUS
Qty: 200 EACH | Refills: 0 | Status: SHIPPED | OUTPATIENT
Start: 2017-08-07 | End: 2017-11-09 | Stop reason: SDUPTHER

## 2017-08-07 RX ORDER — ATORVASTATIN CALCIUM 20 MG/1
20 TABLET, FILM COATED ORAL DAILY
Qty: 90 TABLET | Refills: 0 | Status: SHIPPED | OUTPATIENT
Start: 2017-08-07 | End: 2017-11-09 | Stop reason: SDUPTHER

## 2017-08-07 RX ORDER — ATORVASTATIN CALCIUM 20 MG/1
TABLET, FILM COATED ORAL
Qty: 90 TABLET | Refills: 1 | Status: CANCELLED | OUTPATIENT
Start: 2017-08-07

## 2017-08-07 RX ORDER — TESTOSTERONE 12.5 MG/1.25G
100 GEL TOPICAL DAILY
Qty: 60 PACKAGE | Refills: 0 | OUTPATIENT
Start: 2017-08-07 | End: 2017-11-09

## 2017-08-07 NOTE — TELEPHONE ENCOUNTER
----- Message from DEREK Kapoor sent at 8/7/2017 12:43 PM EDT -----  Rx changed  ----- Message -----     From: Denise Clemens MA     Sent: 8/7/2017  12:02 PM       To: DEREK Kapoor    Patient called this morning stating that he received a letter from [employer] stating that they will no long cover Androgel 1.62%. They will cover testosterone 1% gel. He is asking to be switched over. He was last seen in June and has an appt in Oct.     Phoned in Rx on 8/7/17 @1:46

## 2017-09-26 DIAGNOSIS — Z79.4 TYPE 2 DIABETES MELLITUS WITH OTHER SPECIFIED COMPLICATION, WITH LONG-TERM CURRENT USE OF INSULIN (HCC): ICD-10-CM

## 2017-09-26 DIAGNOSIS — E03.9 PRIMARY HYPOTHYROIDISM: ICD-10-CM

## 2017-09-26 DIAGNOSIS — E11.69 TYPE 2 DIABETES MELLITUS WITH OTHER SPECIFIED COMPLICATION, WITH LONG-TERM CURRENT USE OF INSULIN (HCC): ICD-10-CM

## 2017-09-26 DIAGNOSIS — E29.1 HYPOGONADISM IN MALE: Primary | ICD-10-CM

## 2017-09-26 DIAGNOSIS — E55.9 VITAMIN D DEFICIENCY: ICD-10-CM

## 2017-10-26 ENCOUNTER — RESULTS ENCOUNTER (OUTPATIENT)
Dept: ENDOCRINOLOGY | Age: 52
End: 2017-10-26

## 2017-10-26 DIAGNOSIS — E11.69 TYPE 2 DIABETES MELLITUS WITH OTHER SPECIFIED COMPLICATION, WITH LONG-TERM CURRENT USE OF INSULIN (HCC): ICD-10-CM

## 2017-10-26 DIAGNOSIS — E03.9 PRIMARY HYPOTHYROIDISM: ICD-10-CM

## 2017-10-26 DIAGNOSIS — E55.9 VITAMIN D DEFICIENCY: ICD-10-CM

## 2017-10-26 DIAGNOSIS — E29.1 HYPOGONADISM IN MALE: ICD-10-CM

## 2017-10-26 DIAGNOSIS — Z79.4 TYPE 2 DIABETES MELLITUS WITH OTHER SPECIFIED COMPLICATION, WITH LONG-TERM CURRENT USE OF INSULIN (HCC): ICD-10-CM

## 2017-11-08 RX ORDER — LEVOTHYROXINE SODIUM 0.1 MG/1
TABLET ORAL
Qty: 30 TABLET | Refills: 5 | Status: CANCELLED | OUTPATIENT
Start: 2017-11-08

## 2017-11-08 RX ORDER — LEVOTHYROXINE SODIUM 0.1 MG/1
100 TABLET ORAL DAILY
Qty: 30 TABLET | Refills: 0 | Status: SHIPPED | OUTPATIENT
Start: 2017-11-08 | End: 2017-11-09 | Stop reason: SDUPTHER

## 2017-11-09 ENCOUNTER — OFFICE VISIT (OUTPATIENT)
Dept: ENDOCRINOLOGY | Age: 52
End: 2017-11-09

## 2017-11-09 VITALS
BODY MASS INDEX: 38.6 KG/M2 | DIASTOLIC BLOOD PRESSURE: 78 MMHG | WEIGHT: 300.8 LBS | HEIGHT: 74 IN | SYSTOLIC BLOOD PRESSURE: 118 MMHG

## 2017-11-09 DIAGNOSIS — E78.5 HYPERLIPIDEMIA, UNSPECIFIED HYPERLIPIDEMIA TYPE: ICD-10-CM

## 2017-11-09 DIAGNOSIS — E11.42 DIABETIC PERIPHERAL NEUROPATHY (HCC): ICD-10-CM

## 2017-11-09 DIAGNOSIS — E29.1 HYPOGONADISM IN MALE: ICD-10-CM

## 2017-11-09 DIAGNOSIS — E03.9 PRIMARY HYPOTHYROIDISM: ICD-10-CM

## 2017-11-09 DIAGNOSIS — E78.00 HYPERCHOLESTEROLEMIA: ICD-10-CM

## 2017-11-09 DIAGNOSIS — E11.65 TYPE 2 DIABETES MELLITUS WITH HYPERGLYCEMIA, UNSPECIFIED LONG TERM INSULIN USE STATUS: ICD-10-CM

## 2017-11-09 DIAGNOSIS — Z79.4 TYPE 2 DIABETES MELLITUS WITH OTHER SPECIFIED COMPLICATION, WITH LONG-TERM CURRENT USE OF INSULIN (HCC): Primary | ICD-10-CM

## 2017-11-09 DIAGNOSIS — E11.69 TYPE 2 DIABETES MELLITUS WITH OTHER SPECIFIED COMPLICATION, WITH LONG-TERM CURRENT USE OF INSULIN (HCC): Primary | ICD-10-CM

## 2017-11-09 PROCEDURE — 99214 OFFICE O/P EST MOD 30 MIN: CPT | Performed by: NURSE PRACTITIONER

## 2017-11-09 RX ORDER — LEVOTHYROXINE SODIUM 0.1 MG/1
100 TABLET ORAL DAILY
Qty: 30 TABLET | Refills: 5 | Status: SHIPPED | OUTPATIENT
Start: 2017-11-09 | End: 2017-11-13

## 2017-11-09 RX ORDER — LANCETS 28 GAUGE
EACH MISCELLANEOUS
Qty: 200 EACH | Refills: 3 | Status: SHIPPED | OUTPATIENT
Start: 2017-11-09 | End: 2018-08-10

## 2017-11-09 RX ORDER — GLIPIZIDE 5 MG/1
5 TABLET ORAL
Qty: 60 TABLET | Refills: 5 | Status: SHIPPED | OUTPATIENT
Start: 2017-11-09 | End: 2018-08-07 | Stop reason: SDUPTHER

## 2017-11-09 RX ORDER — MULTIVIT WITH MINERALS/LUTEIN
2000 TABLET ORAL 2 TIMES DAILY
COMMUNITY

## 2017-11-09 RX ORDER — ATORVASTATIN CALCIUM 20 MG/1
20 TABLET, FILM COATED ORAL DAILY
Qty: 30 TABLET | Refills: 5 | Status: SHIPPED | OUTPATIENT
Start: 2017-11-09 | End: 2018-06-11

## 2017-11-09 RX ORDER — INSULIN DEGLUDEC 200 U/ML
46 INJECTION, SOLUTION SUBCUTANEOUS DAILY
Qty: 3 PEN | Refills: 5 | Status: SHIPPED | OUTPATIENT
Start: 2017-11-09 | End: 2018-04-27 | Stop reason: SDUPTHER

## 2017-11-09 RX ORDER — PEN NEEDLE, DIABETIC 29 G X1/2"
NEEDLE, DISPOSABLE MISCELLANEOUS
Qty: 200 EACH | Refills: 3 | Status: SHIPPED | OUTPATIENT
Start: 2017-11-09 | End: 2018-08-10

## 2017-11-09 NOTE — PATIENT INSTRUCTIONS
Start axiron 2 pumps under each arm daily  Keep checking BS  Pending lab results, we will inform you of any medication changes  Call office with any questions or concerns

## 2017-11-09 NOTE — PROGRESS NOTES
"Subjective   Chetan Grimm is a 51 y.o. male is here today for follow-up.    Chief Complaint   Patient presents with   • Diabetes     no recent labs, test BG 2x daily, pt forgot meter today.   • Hyperlipidemia     pt is no longer doing the androgel   • Hypothyroidism   • Hypogonadism   • low testosterone     /78  Ht 73.5\" (186.7 cm)  Wt (!) 300 lb 12.8 oz (136 kg)  BMI 39.15 kg/m2    Family History   Problem Relation Age of Onset   • Heart disease Mother    • Hypertension Mother    • Hypertension Father    • Heart disease Father      Social History     Social History   • Marital status:      Spouse name: N/A   • Number of children: N/A   • Years of education: N/A     Social History Main Topics   • Smoking status: Never Smoker   • Smokeless tobacco: None   • Alcohol use No   • Drug use: No   • Sexual activity: Defer     Other Topics Concern   • None     Social History Narrative       Current Outpatient Prescriptions:   •  ascorbic acid (VITAMIN C) 1000 MG tablet, Take 1,000 mg by mouth Daily., Disp: , Rfl:   •  aspirin 81 MG chewable tablet, Chew 81 mg daily., Disp: , Rfl:   •  atorvastatin (LIPITOR) 20 MG tablet, Take 1 tablet by mouth Daily., Disp: 90 tablet, Rfl: 0  •  Cholecalciferol 4000 units capsule, Take 1 capsule by mouth Daily., Disp: , Rfl:   •  cyclobenzaprine (FLEXERIL) 10 MG tablet, Take 10 mg by mouth 2 (two) times a day as needed., Disp: , Rfl:   •  Dapagliflozin-Metformin HCl ER (XIGDUO XR) 5-1000 MG tablet sustained-release 24 hour, Take 2 tablets by mouth Daily., Disp: 60 tablet, Rfl: 5  •  ergocalciferol (DRISDOL) 25342 UNITS capsule, Take 1 po twice q week, Disp: 24 capsule, Rfl: 1  •  glipiZIDE (GLUCOTROL) 5 MG tablet, Take 1 tablet by mouth 2 (Two) Times a Day Before Meals., Disp: 60 tablet, Rfl: 5  •  glucagon (GLUCAGON EMERGENCY) 1 MG injection, Inject 1 mg under the skin 1 (One) Time As Needed for low blood sugar for up to 1 dose., Disp: 1 kit, Rfl: 5  •  glucose blood " (FREESTYLE LITE) test strip, Use to test 4 times daily, Disp: 200 each, Rfl: 5  •  HYDROcodone-acetaminophen (NORCO) 5-325 MG per tablet, Take 1 tablet by mouth As Needed., Disp: , Rfl:   •  Lancets (FREESTYLE) lancets, Use to test BG 4 times daily, Disp: 200 each, Rfl: 5  •  levothyroxine (SYNTHROID) 100 MCG tablet, Take 1 tablet by mouth Daily., Disp: 30 tablet, Rfl: 0  •  lisinopril (PRINIVIL,ZESTRIL) 5 MG tablet, Take 5 mg by mouth every night., Disp: , Rfl:   •  mupirocin (BACTROBAN) 2 % ointment, Apply  topically As Needed., Disp: , Rfl:   •  RELION PEN NEEDLES 29G X 12MM misc, Use to inject insulin 2 times, Disp: 200 each, Rfl: 0  •  TANZEUM 50 MG pen-injector, Inject 1 dose under the skin 1 (One) Time Per Week., Disp: 4 each, Rfl: 5  •  TRESIBA FLEXTOUCH 200 UNIT/ML solution pen-injector, Inject 46 Units under the skin Daily., Disp: 3 pen, Rfl: 5  •  clotrimazole-betamethasone (LOTRISONE) 1-0.05 % cream, , Disp: , Rfl:   •  testosterone (ANDROGEL) 25 MG/2.5GM (1%) gel gel, Place 100 mg on the skin Daily., Disp: 60 package, Rfl: 0    Current Facility-Administered Medications:   •  nitroglycerin (NITROSTAT) SL tablet 0.4 mg, 0.4 mg, Sublingual, Q5 Min PRN, Edmond Gordon MD    Allergies   Allergen Reactions   • Adhesive Tape        History of Present Illness    Encounter Diagnoses   Name Primary?   • Type 2 diabetes mellitus with other specified complication, with long-term current use of insulin Yes   • Hypogonadism in male    • Primary hypothyroidism    • Hyperlipidemia, unspecified hyperlipidemia type      Follow up for above mentioned conditions.    States he has had surgery for carpal tunnel on both hands and L shoulder operated on since he was last here. Has surgery planned for 12/2017 for R shoulder surgery. Following with ortho.    Checks BS twice daily. BS run  throughout the day. States he has to consistently eat being on the xigduo. States he sometimes skips meals and due to his  schedule can have trouble with a regular eating schedule. States lows of 50-60's at times.     Is not taking Androgel because was told his insurance will not cover generic. Would like a prescription for testosterone that will be covered by his insurance.     Last dilated eye exam 2 months ago; no retinopathy      The following portions of the patient's history were reviewed and updated as appropriate: allergies, current medications, past family history, past medical history, past social history, past surgical history and problem list.    Review of Systems   Constitutional: Negative for fatigue.   HENT: Negative for trouble swallowing.    Eyes: Negative for visual disturbance.   Respiratory: Negative for shortness of breath.    Cardiovascular: Negative for leg swelling.   Skin: Negative for wound.   Neurological: Negative for numbness.       Objective   Physical Exam   Constitutional: He is oriented to person, place, and time. He appears well-developed and well-nourished. No distress.   HENT:   Head: Normocephalic and atraumatic.   Eyes: Pupils are equal, round, and reactive to light.   Neck: Normal range of motion. Neck supple. Carotid bruit is not present. No tracheal deviation, no edema and no erythema present. No thyromegaly present.   Cardiovascular: Normal rate, regular rhythm, normal heart sounds and intact distal pulses.  Exam reveals no gallop and no friction rub.    No murmur heard.  Pulmonary/Chest: Effort normal and breath sounds normal. No respiratory distress. He has no wheezes. He has no rales.   Musculoskeletal: Normal range of motion. He exhibits no edema or deformity.   Lymphadenopathy:     He has no cervical adenopathy.   Neurological: He is alert and oriented to person, place, and time. Coordination normal.   Skin: Skin is warm and dry. No rash noted. He is not diaphoretic. No erythema. No pallor.   Psychiatric: He has a normal mood and affect. His behavior is normal. Judgment and thought content  normal.   Nursing note and vitals reviewed.      Results for orders placed or performed in visit on 05/23/17   Comprehensive Metabolic Panel   Result Value Ref Range    Glucose 126 (H) 65 - 99 mg/dL    BUN 15 6 - 20 mg/dL    Creatinine 0.87 0.76 - 1.27 mg/dL    eGFR Non African Am 93 >60 mL/min/1.73    eGFR African Am 112 >60 mL/min/1.73    BUN/Creatinine Ratio 17.2 7.0 - 25.0    Sodium 139 136 - 145 mmol/L    Potassium 4.0 3.5 - 5.2 mmol/L    Chloride 101 98 - 107 mmol/L    Total CO2 20.9 (L) 22.0 - 29.0 mmol/L    Calcium 9.7 8.6 - 10.5 mg/dL    Total Protein 7.3 6.0 - 8.5 g/dL    Albumin 4.10 3.50 - 5.20 g/dL    Globulin 3.2 gm/dL    A/G Ratio 1.3 g/dL    Total Bilirubin 0.8 0.1 - 1.2 mg/dL    Alkaline Phosphatase 91 39 - 117 U/L    AST (SGOT) 19 1 - 40 U/L    ALT (SGPT) 25 1 - 41 U/L   Lipid Panel   Result Value Ref Range    Total Cholesterol 167 0 - 200 mg/dL    Triglycerides 282 (H) 0 - 150 mg/dL    HDL Cholesterol 27 (L) 40 - 60 mg/dL    VLDL Cholesterol 56.4 (H) 5 - 40 mg/dL    LDL Cholesterol  84 0 - 100 mg/dL   Vitamin D 25 Hydroxy   Result Value Ref Range    25 Hydroxy, Vitamin D 24.1 (L) 30.0 - 100.0 ng/mL   Hemoglobin A1c   Result Value Ref Range    Hemoglobin A1C 5.99 (H) 4.80 - 5.60 %   T3, Free   Result Value Ref Range    T3, Free 3.0 2.0 - 4.4 pg/mL   C-Peptide   Result Value Ref Range    C-Peptide 4.2 1.1 - 4.4 ng/mL   T4, Free   Result Value Ref Range    Free T4 1.29 0.93 - 1.70 ng/dL   Thyroid Panel With TSH   Result Value Ref Range    TSH 8.720 (H) 0.450 - 4.500 uIU/mL    T4, Total 6.4 4.5 - 12.0 ug/dL    T3 Uptake 31 24 - 39 %    Free Thyroxine Index 2.0 1.2 - 4.9   Comprehensive Thyroglobulin   Result Value Ref Range    Thyroglobulin Ab 17 (H) IU/mL    Thyroglobulin Comment ng/mL    Thyroglobulin (TG-TERRI) <2.0 ng/mL   Hemoglobin & Hematocrit, Blood   Result Value Ref Range    Hemoglobin 16.6 13.7 - 17.6 g/dL    Hematocrit 48.1 40.4 - 52.2 %   PSA   Result Value Ref Range    PSA 0.703 0.000 -  4.000 ng/mL   TestT+TestF+SHBG   Result Value Ref Range    Testosterone, Total 247 (L) 348 - 1197 ng/dL    Comment Comment     Testosterone, Free 5.7 (L) 7.2 - 24.0 pg/mL    Sex Hormone Binding Globulin 28.4 19.3 - 76.4 nmol/L   MicroAlbumin, Urine, Random   Result Value Ref Range    Microalbumin, Urine <3.0 Not Estab. ug/mL         Assessment/Plan   Problems Addressed this Visit        Cardiovascular and Mediastinum    Hyperlipidemia       Endocrine    Diabetes mellitus - Primary    Primary hypothyroidism    Hypogonadism in male          51 year old patient seen and examined. A comprehensive laboratory evaluation will be done at today's visit. We will order Axiron 2 pumps under each arm daily for hypogonadism. He has not been taking Androgel as he was told it was not covered by his insurance. Pending today's lab results, he will be informed of any medication changes that need to be made. He was informed to continue checking his blood sugars. Follow up in 4 months with labs prior. He was informed to call the office with any questions or concerns. hailey reviewed         Current Outpatient Prescriptions:   •  ascorbic acid (VITAMIN C) 1000 MG tablet, Take 1,000 mg by mouth Daily., Disp: , Rfl:   •  aspirin 81 MG chewable tablet, Chew 81 mg daily., Disp: , Rfl:   •  atorvastatin (LIPITOR) 20 MG tablet, Take 1 tablet by mouth Daily., Disp: 90 tablet, Rfl: 0  •  AXIRON 30 MG/ACT solution, 2 pumps under each arm daily, Disp: 180 mL, Rfl: 0  •  Cholecalciferol 4000 units capsule, Take 1 capsule by mouth Daily., Disp: , Rfl:   •  cyclobenzaprine (FLEXERIL) 10 MG tablet, Take 10 mg by mouth 2 (two) times a day as needed., Disp: , Rfl:   •  Dapagliflozin-Metformin HCl ER (XIGDUO XR) 5-1000 MG tablet sustained-release 24 hour, Take 2 tablets by mouth Daily., Disp: 60 tablet, Rfl: 5  •  ergocalciferol (DRISDOL) 20013 UNITS capsule, Take 1 po twice q week, Disp: 24 capsule, Rfl: 1  •  glipiZIDE (GLUCOTROL) 5 MG tablet, Take 1  tablet by mouth 2 (Two) Times a Day Before Meals., Disp: 60 tablet, Rfl: 5  •  glucagon (GLUCAGON EMERGENCY) 1 MG injection, Inject 1 mg under the skin 1 (One) Time As Needed for low blood sugar for up to 1 dose., Disp: 1 kit, Rfl: 5  •  glucose blood (FREESTYLE LITE) test strip, Use to test 4 times daily, Disp: 200 each, Rfl: 5  •  HYDROcodone-acetaminophen (NORCO) 5-325 MG per tablet, Take 1 tablet by mouth As Needed., Disp: , Rfl:   •  Lancets (FREESTYLE) lancets, Use to test BG 4 times daily, Disp: 200 each, Rfl: 5  •  levothyroxine (SYNTHROID) 100 MCG tablet, Take 1 tablet by mouth Daily., Disp: 30 tablet, Rfl: 0  •  lisinopril (PRINIVIL,ZESTRIL) 5 MG tablet, Take 5 mg by mouth every night., Disp: , Rfl:   •  mupirocin (BACTROBAN) 2 % ointment, Apply  topically As Needed., Disp: , Rfl:   •  RELION PEN NEEDLES 29G X 12MM misc, Use to inject insulin 2 times, Disp: 200 each, Rfl: 0  •  TANZEUM 50 MG pen-injector, Inject 1 dose under the skin 1 (One) Time Per Week., Disp: 4 each, Rfl: 5  •  TRESIBA FLEXTOUCH 200 UNIT/ML solution pen-injector, Inject 46 Units under the skin Daily., Disp: 3 pen, Rfl: 5  •  clotrimazole-betamethasone (LOTRISONE) 1-0.05 % cream, , Disp: , Rfl:     Current Facility-Administered Medications:   •  nitroglycerin (NITROSTAT) SL tablet 0.4 mg, 0.4 mg, Sublingual, Q5 Min PRN, Edmond Gordon MD

## 2017-11-10 LAB
25(OH)D3+25(OH)D2 SERPL-MCNC: 27.6 NG/ML (ref 30–100)
ALBUMIN SERPL-MCNC: 4.2 G/DL (ref 3.5–5.2)
ALBUMIN/GLOB SERPL: 1.4 G/DL
ALP SERPL-CCNC: 107 U/L (ref 39–117)
ALT SERPL-CCNC: 25 U/L (ref 1–41)
AST SERPL-CCNC: 18 U/L (ref 1–40)
BILIRUB SERPL-MCNC: 0.7 MG/DL (ref 0.1–1.2)
BUN SERPL-MCNC: 8 MG/DL (ref 6–20)
BUN/CREAT SERPL: 9.8 (ref 7–25)
C PEPTIDE SERPL-MCNC: 5.5 NG/ML (ref 1.1–4.4)
CALCIUM SERPL-MCNC: 9.6 MG/DL (ref 8.6–10.5)
CHLORIDE SERPL-SCNC: 101 MMOL/L (ref 98–107)
CHOLEST SERPL-MCNC: 149 MG/DL (ref 0–200)
CO2 SERPL-SCNC: 20.7 MMOL/L (ref 22–29)
CREAT SERPL-MCNC: 0.82 MG/DL (ref 0.76–1.27)
FT4I SERPL CALC-MCNC: 2.1 (ref 1.2–4.9)
GFR SERPLBLD CREATININE-BSD FMLA CKD-EPI: 120 ML/MIN/1.73
GFR SERPLBLD CREATININE-BSD FMLA CKD-EPI: 99 ML/MIN/1.73
GLOBULIN SER CALC-MCNC: 3 GM/DL
GLUCOSE SERPL-MCNC: 142 MG/DL (ref 65–99)
HBA1C MFR BLD: 7.1 % (ref 4.8–5.6)
HCT VFR BLD AUTO: 44 % (ref 40.4–52.2)
HDLC SERPL-MCNC: 28 MG/DL (ref 40–60)
HGB BLD-MCNC: 15 G/DL (ref 13.7–17.6)
LDLC SERPL CALC-MCNC: 88 MG/DL (ref 0–100)
MICROALBUMIN UR-MCNC: 4.3 UG/ML
POTASSIUM SERPL-SCNC: 4.2 MMOL/L (ref 3.5–5.2)
PROT SERPL-MCNC: 7.2 G/DL (ref 6–8.5)
PSA SERPL-MCNC: 0.63 NG/ML (ref 0–4)
SHBG SERPL-SCNC: 45.2 NMOL/L (ref 19.3–76.4)
SODIUM SERPL-SCNC: 139 MMOL/L (ref 136–145)
T3FREE SERPL-MCNC: 3.1 PG/ML (ref 2–4.4)
T3RU NFR SERPL: 29 % (ref 24–39)
T4 FREE SERPL-MCNC: 1.34 NG/DL (ref 0.93–1.7)
T4 SERPL-MCNC: 7.3 UG/DL (ref 4.5–12)
TESTOST FREE SERPL-MCNC: 6.4 PG/ML (ref 7.2–24)
TESTOST SERPL-MCNC: 380 NG/DL (ref 264–916)
TRIGL SERPL-MCNC: 164 MG/DL (ref 0–150)
TSH SERPL DL<=0.005 MIU/L-ACNC: 4.68 UIU/ML (ref 0.45–4.5)
VLDLC SERPL CALC-MCNC: 32.8 MG/DL (ref 5–40)

## 2017-11-13 RX ORDER — LEVOTHYROXINE SODIUM 0.12 MG/1
125 TABLET ORAL DAILY
Qty: 30 TABLET | Refills: 11 | Status: SHIPPED | OUTPATIENT
Start: 2017-11-13 | End: 2018-12-21 | Stop reason: SDUPTHER

## 2017-11-13 RX ORDER — ERGOCALCIFEROL 1.25 MG/1
CAPSULE ORAL
Qty: 36 CAPSULE | Refills: 1 | Status: SHIPPED | OUTPATIENT
Start: 2017-11-13 | End: 2018-05-14

## 2017-11-14 ENCOUNTER — TELEPHONE (OUTPATIENT)
Dept: ENDOCRINOLOGY | Age: 52
End: 2017-11-14

## 2017-11-14 NOTE — TELEPHONE ENCOUNTER
----- Message from DEREK Kapoor sent at 11/13/2017  6:15 PM EST -----  Let pt know about labs and med changes    Called patient with lab results and med changes. Pt expressed understanding.

## 2017-11-25 NOTE — TELEPHONE ENCOUNTER
Called pts pcp again and left another v/m asking to return my call in regards of needing his recent labs faxed over.   continue home meds

## 2017-12-06 NOTE — ED NOTES
"Pt also has been \"feeling sick and sweating over the past 3 days feeling like i could throw up but do not.\" he reports \"i feel like my equilibrium is off sitting down or standing.\"      Leslye Matos RN  03/21/17 8228    " Subconjunctival Hemorrhage   WHAT YOU NEED TO KNOW:   A subconjunctival hemorrhage is when blood collects under the conjunctiva in your eye  The conjunctiva is the clear lining that covers the white part of your eye  The blood comes from broken blood vessels under the conjunctiva  DISCHARGE INSTRUCTIONS:   Care for your eye:   · Cold or warm compress:  Use a cold pack during the first 24 hours  Ask how often to apply it and for how long each time  After the first 24 hours, apply a warm pack on your eye  Do this 3 times each day for about 10 to 15 minutes each time  · Eyedrops: You may need artificial tears to keep your eye moist  Use the drops as directed  Follow up with your healthcare provider or eye specialist as directed:  Write down your questions so you remember to ask them during your visits  Contact your healthcare provider or eye specialist if:   · The redness in your eye has not gone away after 3 weeks  · You have another subconjunctival hemorrhage  · You have subconjunctival hemorrhages in both eyes  · You have questions or concerns about your condition or care  Return to the emergency department if:   · You have eye pain or sensitivity to light  · Your vision changes  · You have white or yellow discharge from your eye  © 2017 [facility] Information is for End User's use only and may not be sold, redistributed or otherwise used for commercial purposes  All illustrations and images included in CareNotes® are the copyrighted property of [employer]  or [address]  The above information is an  only  It is not intended as medical advice for individual conditions or treatments  Talk to your doctor, nurse or pharmacist before following any medical regimen to see if it is safe and effective for you

## 2018-04-12 DIAGNOSIS — Z79.4 TYPE 2 DIABETES MELLITUS WITH OTHER SPECIFIED COMPLICATION, WITH LONG-TERM CURRENT USE OF INSULIN (HCC): Primary | ICD-10-CM

## 2018-04-12 DIAGNOSIS — E29.1 HYPOGONADISM IN MALE: ICD-10-CM

## 2018-04-12 DIAGNOSIS — E11.69 TYPE 2 DIABETES MELLITUS WITH OTHER SPECIFIED COMPLICATION, WITH LONG-TERM CURRENT USE OF INSULIN (HCC): Primary | ICD-10-CM

## 2018-04-12 DIAGNOSIS — E55.9 VITAMIN D DEFICIENCY: ICD-10-CM

## 2018-04-12 DIAGNOSIS — E03.9 PRIMARY HYPOTHYROIDISM: ICD-10-CM

## 2018-04-16 ENCOUNTER — OFFICE VISIT (OUTPATIENT)
Dept: SLEEP MEDICINE | Facility: HOSPITAL | Age: 53
End: 2018-04-16
Attending: INTERNAL MEDICINE

## 2018-04-16 VITALS
BODY MASS INDEX: 37.22 KG/M2 | SYSTOLIC BLOOD PRESSURE: 119 MMHG | DIASTOLIC BLOOD PRESSURE: 72 MMHG | HEIGHT: 74 IN | HEART RATE: 73 BPM | WEIGHT: 290 LBS

## 2018-04-16 DIAGNOSIS — E66.9 OBESITY (BMI 30-39.9): ICD-10-CM

## 2018-04-16 DIAGNOSIS — I10 BENIGN ESSENTIAL HTN: ICD-10-CM

## 2018-04-16 DIAGNOSIS — Z99.89 OSA ON CPAP: Primary | ICD-10-CM

## 2018-04-16 DIAGNOSIS — G47.33 OSA ON CPAP: Primary | ICD-10-CM

## 2018-04-16 DIAGNOSIS — G47.10 HYPERSOMNIA WITH SLEEP APNEA: ICD-10-CM

## 2018-04-16 DIAGNOSIS — G47.30 HYPERSOMNIA WITH SLEEP APNEA: ICD-10-CM

## 2018-04-16 PROCEDURE — G0463 HOSPITAL OUTPT CLINIC VISIT: HCPCS

## 2018-04-17 ENCOUNTER — LAB (OUTPATIENT)
Dept: ENDOCRINOLOGY | Age: 53
End: 2018-04-17

## 2018-04-17 DIAGNOSIS — E55.9 VITAMIN D DEFICIENCY: ICD-10-CM

## 2018-04-17 DIAGNOSIS — Z79.4 TYPE 2 DIABETES MELLITUS WITH OTHER SPECIFIED COMPLICATION, WITH LONG-TERM CURRENT USE OF INSULIN (HCC): ICD-10-CM

## 2018-04-17 DIAGNOSIS — E29.1 HYPOGONADISM IN MALE: ICD-10-CM

## 2018-04-17 DIAGNOSIS — E03.9 PRIMARY HYPOTHYROIDISM: ICD-10-CM

## 2018-04-17 DIAGNOSIS — E11.69 TYPE 2 DIABETES MELLITUS WITH OTHER SPECIFIED COMPLICATION, WITH LONG-TERM CURRENT USE OF INSULIN (HCC): ICD-10-CM

## 2018-04-18 PROCEDURE — 99284 EMERGENCY DEPT VISIT MOD MDM: CPT

## 2018-04-19 ENCOUNTER — APPOINTMENT (OUTPATIENT)
Dept: GENERAL RADIOLOGY | Facility: HOSPITAL | Age: 53
End: 2018-04-19

## 2018-04-19 ENCOUNTER — HOSPITAL ENCOUNTER (INPATIENT)
Facility: HOSPITAL | Age: 53
LOS: 2 days | Discharge: HOME | End: 2018-04-21
Attending: EMERGENCY MEDICINE | Admitting: HOSPITALIST

## 2018-04-19 ENCOUNTER — APPOINTMENT (OUTPATIENT)
Dept: CT IMAGING | Facility: HOSPITAL | Age: 53
End: 2018-04-19

## 2018-04-19 DIAGNOSIS — K52.9 ENTERITIS: ICD-10-CM

## 2018-04-19 DIAGNOSIS — R19.7 NAUSEA VOMITING AND DIARRHEA: ICD-10-CM

## 2018-04-19 DIAGNOSIS — K52.9 COLITIS: Primary | ICD-10-CM

## 2018-04-19 DIAGNOSIS — R11.2 NAUSEA VOMITING AND DIARRHEA: ICD-10-CM

## 2018-04-19 LAB
ALBUMIN SERPL-MCNC: 4.2 G/DL (ref 3.5–5.2)
ALBUMIN/GLOB SERPL: 1.4 G/DL
ALP SERPL-CCNC: 88 U/L (ref 39–117)
ALT SERPL W P-5'-P-CCNC: 24 U/L (ref 1–41)
ANION GAP SERPL CALCULATED.3IONS-SCNC: 14.7 MMOL/L
APTT PPP: 26 SECONDS (ref 22.7–35.4)
AST SERPL-CCNC: 21 U/L (ref 1–40)
BASOPHILS # BLD AUTO: 0.01 10*3/MM3 (ref 0–0.2)
BASOPHILS NFR BLD AUTO: 0.1 % (ref 0–1.5)
BILIRUB SERPL-MCNC: 1 MG/DL (ref 0.1–1.2)
BUN BLD-MCNC: 13 MG/DL (ref 6–20)
BUN/CREAT SERPL: 19.1 (ref 7–25)
CALCIUM SPEC-SCNC: 8.9 MG/DL (ref 8.6–10.5)
CHLORIDE SERPL-SCNC: 101 MMOL/L (ref 98–107)
CO2 SERPL-SCNC: 22.3 MMOL/L (ref 22–29)
CREAT BLD-MCNC: 0.68 MG/DL (ref 0.76–1.27)
DEPRECATED RDW RBC AUTO: 45.5 FL (ref 37–54)
EOSINOPHIL # BLD AUTO: 0.19 10*3/MM3 (ref 0–0.7)
EOSINOPHIL NFR BLD AUTO: 1.2 % (ref 0.3–6.2)
ERYTHROCYTE [DISTWIDTH] IN BLOOD BY AUTOMATED COUNT: 13.5 % (ref 11.5–14.5)
GFR SERPL CREATININE-BSD FRML MDRD: 122 ML/MIN/1.73
GLOBULIN UR ELPH-MCNC: 3.1 GM/DL
GLUCOSE BLD-MCNC: 167 MG/DL (ref 65–99)
HCT VFR BLD AUTO: 46.7 % (ref 40.4–52.2)
HGB BLD-MCNC: 15.7 G/DL (ref 13.7–17.6)
IMM GRANULOCYTES # BLD: 0.05 10*3/MM3 (ref 0–0.03)
IMM GRANULOCYTES NFR BLD: 0.3 % (ref 0–0.5)
INR PPP: 0.97 (ref 0.9–1.1)
LIPASE SERPL-CCNC: 14 U/L (ref 13–60)
LYMPHOCYTES # BLD AUTO: 0.72 10*3/MM3 (ref 0.9–4.8)
LYMPHOCYTES NFR BLD AUTO: 4.4 % (ref 19.6–45.3)
MCH RBC QN AUTO: 31.5 PG (ref 27–32.7)
MCHC RBC AUTO-ENTMCNC: 33.6 G/DL (ref 32.6–36.4)
MCV RBC AUTO: 93.6 FL (ref 79.8–96.2)
MONOCYTES # BLD AUTO: 0.78 10*3/MM3 (ref 0.2–1.2)
MONOCYTES NFR BLD AUTO: 4.7 % (ref 5–12)
NEUTROPHILS # BLD AUTO: 14.74 10*3/MM3 (ref 1.9–8.1)
NEUTROPHILS NFR BLD AUTO: 89.3 % (ref 42.7–76)
NT-PROBNP SERPL-MCNC: 5.2 PG/ML (ref 5–900)
PLATELET # BLD AUTO: 217 10*3/MM3 (ref 140–500)
PMV BLD AUTO: 10.3 FL (ref 6–12)
POTASSIUM BLD-SCNC: 4.7 MMOL/L (ref 3.5–5.2)
PROT SERPL-MCNC: 7.3 G/DL (ref 6–8.5)
PROTHROMBIN TIME: 12.7 SECONDS (ref 11.7–14.2)
RBC # BLD AUTO: 4.99 10*6/MM3 (ref 4.6–6)
SODIUM BLD-SCNC: 138 MMOL/L (ref 136–145)
TROPONIN T SERPL-MCNC: <0.01 NG/ML (ref 0–0.03)
WBC NRBC COR # BLD: 16.49 10*3/MM3 (ref 4.5–10.7)

## 2018-04-19 PROCEDURE — 85610 PROTHROMBIN TIME: CPT | Performed by: EMERGENCY MEDICINE

## 2018-04-19 PROCEDURE — 99254 IP/OBS CNSLTJ NEW/EST MOD 60: CPT | Performed by: INTERNAL MEDICINE

## 2018-04-19 PROCEDURE — 25010000002 ONDANSETRON PER 1 MG: Performed by: EMERGENCY MEDICINE

## 2018-04-19 PROCEDURE — 84484 ASSAY OF TROPONIN QUANT: CPT | Performed by: EMERGENCY MEDICINE

## 2018-04-19 PROCEDURE — 74177 CT ABD & PELVIS W/CONTRAST: CPT

## 2018-04-19 PROCEDURE — 93010 ELECTROCARDIOGRAM REPORT: CPT | Performed by: INTERNAL MEDICINE

## 2018-04-19 PROCEDURE — 93005 ELECTROCARDIOGRAM TRACING: CPT | Performed by: EMERGENCY MEDICINE

## 2018-04-19 PROCEDURE — 25010000002 ONDANSETRON PER 1 MG: Performed by: HOSPITALIST

## 2018-04-19 PROCEDURE — 25010000002 HYDROMORPHONE PER 4 MG: Performed by: EMERGENCY MEDICINE

## 2018-04-19 PROCEDURE — 25010000002 IOPAMIDOL 61 % SOLUTION: Performed by: EMERGENCY MEDICINE

## 2018-04-19 PROCEDURE — 25010000003 HYDROMORPHONE PER 4 MG: Performed by: HOSPITALIST

## 2018-04-19 PROCEDURE — 25010000002 PROMETHAZINE PER 50 MG: Performed by: HOSPITALIST

## 2018-04-19 PROCEDURE — 80053 COMPREHEN METABOLIC PANEL: CPT | Performed by: EMERGENCY MEDICINE

## 2018-04-19 PROCEDURE — 83880 ASSAY OF NATRIURETIC PEPTIDE: CPT | Performed by: EMERGENCY MEDICINE

## 2018-04-19 PROCEDURE — 83690 ASSAY OF LIPASE: CPT | Performed by: EMERGENCY MEDICINE

## 2018-04-19 PROCEDURE — 71046 X-RAY EXAM CHEST 2 VIEWS: CPT

## 2018-04-19 PROCEDURE — 25010000002 LEVOFLOXACIN PER 250 MG: Performed by: HOSPITALIST

## 2018-04-19 PROCEDURE — 85730 THROMBOPLASTIN TIME PARTIAL: CPT | Performed by: EMERGENCY MEDICINE

## 2018-04-19 PROCEDURE — 85025 COMPLETE CBC W/AUTO DIFF WBC: CPT | Performed by: EMERGENCY MEDICINE

## 2018-04-19 RX ORDER — ONDANSETRON 2 MG/ML
4 INJECTION INTRAMUSCULAR; INTRAVENOUS ONCE
Status: COMPLETED | OUTPATIENT
Start: 2018-04-19 | End: 2018-04-19

## 2018-04-19 RX ORDER — LEVOFLOXACIN 5 MG/ML
750 INJECTION, SOLUTION INTRAVENOUS EVERY 24 HOURS
Status: DISCONTINUED | OUTPATIENT
Start: 2018-04-19 | End: 2018-04-21

## 2018-04-19 RX ORDER — CHLORAL HYDRATE 500 MG
1000 CAPSULE ORAL
COMMUNITY

## 2018-04-19 RX ORDER — SODIUM CHLORIDE 9 MG/ML
125 INJECTION, SOLUTION INTRAVENOUS CONTINUOUS
Status: DISCONTINUED | OUTPATIENT
Start: 2018-04-19 | End: 2018-04-20

## 2018-04-19 RX ORDER — ATORVASTATIN CALCIUM 10 MG/1
10 TABLET, FILM COATED ORAL DAILY
Status: DISCONTINUED | OUTPATIENT
Start: 2018-04-19 | End: 2018-04-21 | Stop reason: HOSPADM

## 2018-04-19 RX ORDER — ATORVASTATIN CALCIUM 20 MG/1
20 TABLET, FILM COATED ORAL DAILY
Status: DISCONTINUED | OUTPATIENT
Start: 2018-04-19 | End: 2018-04-19

## 2018-04-19 RX ORDER — PROMETHAZINE HYDROCHLORIDE 25 MG/ML
12.5 INJECTION, SOLUTION INTRAMUSCULAR; INTRAVENOUS EVERY 4 HOURS PRN
Status: DISCONTINUED | OUTPATIENT
Start: 2018-04-19 | End: 2018-04-21

## 2018-04-19 RX ORDER — LEVOTHYROXINE SODIUM 0.12 MG/1
125 TABLET ORAL EVERY MORNING
Status: DISCONTINUED | OUTPATIENT
Start: 2018-04-19 | End: 2018-04-21 | Stop reason: HOSPADM

## 2018-04-19 RX ORDER — MELATONIN
1000 DAILY
Status: DISCONTINUED | OUTPATIENT
Start: 2018-04-19 | End: 2018-04-21 | Stop reason: HOSPADM

## 2018-04-19 RX ORDER — SODIUM CHLORIDE 0.9 % (FLUSH) 0.9 %
10 SYRINGE (ML) INJECTION AS NEEDED
Status: DISCONTINUED | OUTPATIENT
Start: 2018-04-19 | End: 2018-04-21 | Stop reason: HOSPADM

## 2018-04-19 RX ORDER — ONDANSETRON 2 MG/ML
4 INJECTION INTRAMUSCULAR; INTRAVENOUS ONCE
Status: DISCONTINUED | OUTPATIENT
Start: 2018-04-19 | End: 2018-04-19

## 2018-04-19 RX ORDER — ONDANSETRON 2 MG/ML
4 INJECTION INTRAMUSCULAR; INTRAVENOUS EVERY 4 HOURS PRN
Status: DISCONTINUED | OUTPATIENT
Start: 2018-04-19 | End: 2018-04-21

## 2018-04-19 RX ORDER — FAMOTIDINE 10 MG/ML
20 INJECTION, SOLUTION INTRAVENOUS EVERY 12 HOURS SCHEDULED
Status: DISCONTINUED | OUTPATIENT
Start: 2018-04-19 | End: 2018-04-20

## 2018-04-19 RX ORDER — ASCORBIC ACID 500 MG
1000 TABLET ORAL DAILY
Status: DISCONTINUED | OUTPATIENT
Start: 2018-04-19 | End: 2018-04-21 | Stop reason: HOSPADM

## 2018-04-19 RX ADMIN — ONDANSETRON 4 MG: 2 INJECTION, SOLUTION INTRAMUSCULAR; INTRAVENOUS at 07:59

## 2018-04-19 RX ADMIN — ONDANSETRON 4 MG: 2 INJECTION, SOLUTION INTRAMUSCULAR; INTRAVENOUS at 11:58

## 2018-04-19 RX ADMIN — ONDANSETRON 4 MG: 2 INJECTION, SOLUTION INTRAMUSCULAR; INTRAVENOUS at 01:40

## 2018-04-19 RX ADMIN — ATORVASTATIN CALCIUM 10 MG: 10 TABLET, FILM COATED ORAL at 16:54

## 2018-04-19 RX ADMIN — SODIUM CHLORIDE 125 ML/HR: 9 INJECTION, SOLUTION INTRAVENOUS at 20:04

## 2018-04-19 RX ADMIN — LEVOFLOXACIN 750 MG: 5 INJECTION, SOLUTION INTRAVENOUS at 10:29

## 2018-04-19 RX ADMIN — SODIUM CHLORIDE 1000 ML: 9 INJECTION, SOLUTION INTRAVENOUS at 01:37

## 2018-04-19 RX ADMIN — HYDROMORPHONE HYDROCHLORIDE 0.5 MG: 10 INJECTION INTRAMUSCULAR; INTRAVENOUS; SUBCUTANEOUS at 07:59

## 2018-04-19 RX ADMIN — ONDANSETRON 4 MG: 2 INJECTION INTRAMUSCULAR; INTRAVENOUS at 03:22

## 2018-04-19 RX ADMIN — IOPAMIDOL 85 ML: 612 INJECTION, SOLUTION INTRAVENOUS at 02:28

## 2018-04-19 RX ADMIN — PROMETHAZINE HYDROCHLORIDE 12.5 MG: 25 INJECTION INTRAMUSCULAR; INTRAVENOUS at 20:12

## 2018-04-19 RX ADMIN — HYDROMORPHONE HYDROCHLORIDE 0.5 MG: 10 INJECTION INTRAMUSCULAR; INTRAVENOUS; SUBCUTANEOUS at 12:09

## 2018-04-19 RX ADMIN — METRONIDAZOLE 500 MG: 500 INJECTION, SOLUTION INTRAVENOUS at 03:22

## 2018-04-19 RX ADMIN — FAMOTIDINE 20 MG: 10 INJECTION INTRAVENOUS at 21:15

## 2018-04-19 RX ADMIN — HYDROMORPHONE HYDROCHLORIDE 1 MG: 1 INJECTION, SOLUTION INTRAMUSCULAR; INTRAVENOUS; SUBCUTANEOUS at 01:41

## 2018-04-19 RX ADMIN — SODIUM CHLORIDE 75 ML/HR: 9 INJECTION, SOLUTION INTRAVENOUS at 07:59

## 2018-04-19 RX ADMIN — METRONIDAZOLE 500 MG: 500 INJECTION, SOLUTION INTRAVENOUS at 12:10

## 2018-04-19 RX ADMIN — HYDROMORPHONE HYDROCHLORIDE 0.5 MG: 10 INJECTION INTRAMUSCULAR; INTRAVENOUS; SUBCUTANEOUS at 20:04

## 2018-04-19 RX ADMIN — VITAMIN D, TAB 1000IU (100/BT) 1000 UNITS: 25 TAB at 16:54

## 2018-04-19 RX ADMIN — FAMOTIDINE 20 MG: 10 INJECTION INTRAVENOUS at 10:35

## 2018-04-19 RX ADMIN — METRONIDAZOLE 500 MG: 500 INJECTION, SOLUTION INTRAVENOUS at 21:14

## 2018-04-19 RX ADMIN — OXYCODONE HYDROCHLORIDE AND ACETAMINOPHEN 1000 MG: 500 TABLET ORAL at 16:54

## 2018-04-19 RX ADMIN — LEVOTHYROXINE SODIUM 125 MCG: 125 TABLET ORAL at 16:54

## 2018-04-19 RX ADMIN — HYDROCORTISONE 2.5%: 25 CREAM TOPICAL at 21:15

## 2018-04-19 NOTE — CONSULTS
[facility], [place], operated by [facility]  Initial Inpatient Consult Note    Referring Provider: RAMIRO Madden    Reason for Consultation: Rectal bleeding, abnormal imaging, abdominal pain    Subjective     52-year-old male previously followed by  for history of rectal bleeding with internal hemorrhoids.  He also has a history significant for CAD, diabetes mellitus, iron deficiency, vitamin D deficiency, and obesity.  Patient reports waking up yesterday morning with epigastric pain that became progressively worse out the day. Abdominal pain is described as sharp with significant pressure.  Pain is worse with movement.  Better at rest.  He has never had pain like this before.  It is colicky in nature.      Associated symptoms include epigastric bloating, nausea, and vomiting ×2.  He also describes significantly worse indigestion with excessive belching and acid reflux. He also had numerous non bloody, watery, green stools yesterday.  In the emergency room, he was found to have leukocytosis with a CT of the abdomen and pelvis demonstrating enterocolitis.      Patient denies a history of GERD but states for the last 2 months he has noticed mild, intermittent epigastric discomfort with frequent belching and indigestion.  His last colonoscopy was performed March 2017 by  for moderate blood per rectum associated with defecation.  The colonoscopy demonstrated large internal hemorrhoids and one hyperplastic polyp.  After the colonoscopy his rectal bleeding increased so Dr. Victoria banded several of his hemorrhoids.  In the last 4 months, his rectal bleeding has increased again. He contacted Dr. Victoria's office 3 months ago regarding his rectal bleeding but has not received a response.  Patient reports 3 months ago his PCP started him on oral iron for iron deficiency anemia. His Hb is at baseline and well within normal limits.  Additional history includes surgical repair of umbilical hernia over 20 years ago. He now has a  bothersome inguinal hernia and uncomplicated ventral hernia he would like to have evaluated.     He denies blood in his emesis, anorexia, early satiety, dysphagia, odynophagia, or black stools.  He denies rectal pain, itching, or burning.  He denies recent antibiotic use but did undergo shoulder surgery in November 2017.  He denies recent foreign travel, exposure to known contaminated foods or sick contacts.    He has never had upper endoscopic evaluation. No family history of gastrointestinal malignancy.        Past Medical History:  Past Medical History:   Diagnosis Date   • Coronary artery disease    • Cryptococcal pneumonitis 2010    TREATED WITH AMPHOTERACIN B   • Diabetes mellitus    • Disease of thyroid gland     HYPOTHYROID   • Hyperlipidemia    • Hypothyroidism    • ANNEL on CPAP    • Pulmonary nodule    • Testosterone deficiency    • Type 2 diabetes mellitus    • Vitamin D deficiency      Past Surgical History:  Past Surgical History:   Procedure Laterality Date   • ANTERIOR CERVICAL CORPECTOMY W/ FUSION  2006    C2-3   • ANTERIOR CERVICAL DISCECTOMY W/ FUSION  2012    C2-T2 PREVIOUS HARDWARE REMOVED AND REVISED   • ANTERIOR CERVICAL FUSION  2008    C3-5   • CARDIAC CATHETERIZATION N/A 6/16/2016    Procedure: Coronary angiography;  Surgeon: Emiliano Gordon MD;  Location:  [facility];  Service:    • CARDIAC CATHETERIZATION N/A 6/16/2016    Procedure: Left Heart Cath;  Surgeon: Emiliano Gordon MD;  Location:  [facility];  Service:    • CARDIAC CATHETERIZATION N/A 6/16/2016    Procedure: Left ventriculography;  Surgeon: Emiliano Gordon MD;  Location:  [facility];  Service:    • CORONARY ANGIOPLASTY  01/2015   • CORONARY STENT PLACEMENT     • HERNIA REPAIR     • KNEE ARTHROSCOPY        Social History:   Social History   Substance Use Topics   • Smoking status: Never Smoker   • Smokeless tobacco: Never Used   • Alcohol use No      Family  History:  Family History   Problem Relation Age of Onset   • Heart disease Mother    • Hypertension Mother    • Hypertension Father    • Heart disease Father        Home Meds:  Facility-Administered Medications Prior to Admission   Medication Dose Route Frequency Provider Last Rate Last Dose   • nitroglycerin (NITROSTAT) SL tablet 0.4 mg  0.4 mg Sublingual Q5 Min JOCEN Edmond Gordon MD         Prescriptions Prior to Admission   Medication Sig Dispense Refill Last Dose   • ascorbic acid (VITAMIN C) 1000 MG tablet Take 1,000 mg by mouth Daily.   4/18/2018 at Unknown time   • aspirin 81 MG chewable tablet Chew 81 mg daily.   4/18/2018 at Unknown time   • atorvastatin (LIPITOR) 20 MG tablet Take 1 tablet by mouth Daily. (Patient taking differently: Take 40 mg by mouth Every Night.) 30 tablet 5 4/18/2018 at Unknown time   • Cholecalciferol (VITAMIN D3) 5000 units capsule capsule Take 5,000 Units by mouth Daily.   4/18/2018 at Unknown time   • cyclobenzaprine (FLEXERIL) 10 MG tablet Take 10 mg by mouth 2 (two) times a day as needed.   4/18/2018 at Unknown time   • Dapagliflozin-Metformin HCl ER (XIGDUO XR) 5-1000 MG tablet sustained-release 24 hour Take 2 tablets by mouth Daily. (Patient taking differently: Take 1 tablet by mouth Daily.) 60 tablet 5 4/18/2018 at Unknown time   • glipiZIDE (GLUCOTROL) 5 MG tablet Take 1 tablet by mouth 2 (Two) Times a Day Before Meals. 60 tablet 5 4/18/2018 at Unknown time   • glucagon (GLUCAGON EMERGENCY) 1 MG injection Inject 1 mg under the skin 1 (One) Time As Needed for low blood sugar for up to 1 dose. 1 kit 5 4/18/2018 at Unknown time   • glucose blood (FREESTYLE LITE) test strip Use to test 4 times daily 200 each 3 4/18/2018 at Unknown time   • IRON PO Take  by mouth.   4/18/2018 at Unknown time   • Lancets (FREESTYLE) lancets Use to test BG 4 times daily 200 each 3 4/18/2018 at Unknown time   • levothyroxine (SYNTHROID) 125 MCG tablet Take 1 tablet by mouth Daily. 30 tablet  11 4/18/2018 at Unknown time   • lisinopril (PRINIVIL,ZESTRIL) 5 MG tablet Take 5 mg by mouth every night.   4/18/2018 at Unknown time   • Omega-3 Fatty Acids (FISH OIL) 1000 MG capsule capsule Take  by mouth Daily With Breakfast.   4/18/2018 at Unknown time   • TANZEUM 50 MG pen-injector Inject 1 dose under the skin 1 (One) Time Per Week. (Patient taking differently: Inject 1 dose under the skin 1 (One) Time Per Week. Pt takes on Tuesday nights) 4 each 5 4/18/2018 at Unknown time   • TRESIBA FLEXTOUCH 200 UNIT/ML solution pen-injector Inject 46 Units under the skin Daily. (Patient taking differently: Inject 46 Units under the skin Every Night.) 3 pen 5 4/18/2018 at Unknown time   • AXIRON 30 MG/ACT solution 2 pumps under each arm daily 180 mL 0    • clotrimazole-betamethasone (LOTRISONE) 1-0.05 % cream    Not Taking   • ergocalciferol (DRISDOL) 64762 units capsule Take 1 po 3 times q week 36 capsule 1    • HYDROcodone-acetaminophen (NORCO) 5-325 MG per tablet Take 1 tablet by mouth As Needed.   Taking   • mupirocin (BACTROBAN) 2 % ointment Apply  topically As Needed.   Taking   • RELION PEN NEEDLES 29G X 12MM misc Use to inject insulin 2 times 200 each 3      Current Meds:     atorvastatin 10 mg Oral Daily   cholecalciferol 1,000 Units Oral Daily   famotidine 20 mg Intravenous Q12H   hydrocortisone  Rectal BID   levoFLOXacin 750 mg Intravenous Q24H   levothyroxine 125 mcg Oral QAM   metroNIDAZOLE 500 mg Intravenous Q8H   ascorbic acid 1,000 mg Oral Daily     Allergies:  Allergies   Allergen Reactions   • Adhesive Tape      Review of Systems  He has excessive belching and bloating all other systems reviewed and negative     Objective     Vital Signs  Temp:  [97 °F (36.1 °C)-98.4 °F (36.9 °C)] 97.2 °F (36.2 °C)  Heart Rate:  [76-95] 76  Resp:  [16-20] 20  BP: ()/(66-93) 103/70  Physical Exam:  General Appearance:    Alert, cooperative, in no acute distress   Head:    Normocephalic, without obvious abnormality,  atraumatic   Eyes:            Lids and lashes normal, conjunctivae and sclerae normal, no   icterus   Throat:   No oral lesions, no thrush, oral mucosa moist   Neck:   No adenopathy, supple, trachea midline, no thyromegaly, no   carotid bruit, no JVD   Lungs:     Clear to auscultation,respirations regular, even and                   unlabored    Heart:    Regular rhythm and normal rate, normal S1 and S2, no            murmur, no gallop, no rub, no click   Chest Wall:    No abnormalities observed   Abdomen:     Normal bowel sounds, no masses, no organomegaly, soft        epigastric tender, non-distended, no guarding, no rebound                 tenderness   Rectal:     Deferred   Extremities:   no edema, no cyanosis, no redness   Skin:   No bleeding, bruising or rash   Lymph nodes:   No palpable adenopathy   Psychiatric:  Judgement and insight: normal   Orientation to person place and time: normal   Mood and affect: normal   Results Review:   I reviewed the patient's new clinical results.      Results from last 7 days  Lab Units 04/19/18  0137   WBC 10*3/mm3 16.49*   HEMOGLOBIN g/dL 15.7   HEMATOCRIT % 46.7   PLATELETS 10*3/mm3 217       Results from last 7 days  Lab Units 04/19/18  0050   SODIUM mmol/L 138   POTASSIUM mmol/L 4.7   CHLORIDE mmol/L 101   CO2 mmol/L 22.3   BUN mg/dL 13   CREATININE mg/dL 0.68*   CALCIUM mg/dL 8.9   BILIRUBIN mg/dL 1.0   ALK PHOS U/L 88   ALT (SGPT) U/L 24   AST (SGOT) U/L 21   GLUCOSE mg/dL 167*       Results from last 7 days  Lab Units 04/19/18  0050   INR  0.97       Lab Results  Lab Value Date/Time   LIPASE 14 04/19/2018 0050   LIPASE 20 06/16/2016 1105       Radiology:  CT Abdomen Pelvis With Contrast   Preliminary Result   1.  Mild to moderately severe enterocolitis is suspected, no   obstruction, perforation or abscess.    2.  1.3 cm nodule at the left lung base is unchanged. Six-month   follow-up is recommended.              XR Chest 2 View   Preliminary Result   No acute  findings.                I have visualized the imaging myself, I see no perforation, there is some mild thickening of small bowel loops, this may be an enteritis    Assessment/Plan   Patient Active Problem List   Diagnosis   • Diabetes mellitus   • Hypersomnia with sleep apnea   • ANNEL on CPAP   • Hypercholesterolemia   • Primary hypothyroidism   • Hyperlipidemia   • Chronic pain   • Low testosterone   • Vitamin D deficiency   • Hypogonadism in male   • Chronic coronary artery disease   • Obesity (BMI 30-39.9)   • Benign essential HTN   • Colitis     Impression/ Plan   1. Enterocolitis by imaging  2. N/V/D- secondary to #1  3. Leukocytosis  4. Acid reflux symptoms  5. Chronic rectal bleeding with known large internal hemorrhoids- non bloody diarrhea yesterday  6. H/o anemia- Vitamin D deficiency and iron deficiency   7. Large IH and hyperplastic polyp- per colonoscopy 3/2017    Anusol suppositories for IH  Check stool studies  Continue symptomatic treatment  Continue antibiotics  Monitor labs and H&H   Pepcid BID until C.diff infection ruled out then change to PPI   He will need EGD and colonoscopy in 8 weeks when acute issues resolve for symptoms and to r/o additional sources of his anemia.  If UGI symptoms persist this admission consider EGD prior to discharge.   We will also refer him to surgery as an outpatient for hemorrhoidectomy and hernia evaluation when acute issues resolve.  Patient would like to change his GI care to [facility] since all of his physicians are associated with [facility].     Office visit with my nurse practitioner 4-6 weeks upon discharge        I discussed the patients findings and my recommendations with patient, family and nursing staff.    Ray Frances MD

## 2018-04-19 NOTE — PLAN OF CARE
Problem: Pain, Acute (Adult)  Goal: Identify Related Risk Factors and Signs and Symptoms  Outcome: Outcome(s) achieved Date Met: 04/19/18

## 2018-04-19 NOTE — ED NOTES
"PT is alert and oriented with no apparent distress. PT reports epigastric pain that radiates \"to my heart and into my arm sometimes but I don't think its my heart\". PT reports nausea, vomiting, and diarrhea since about 1100. PT denies SOA, fever, or dysuria. PT states he does have a hx of heart problems with stent placement. PT updated on plan of care at this point. Family is at bedside, bed in low position, call light within reach. Will continue to monitor.     Audrey Avila RN  04/19/18 0043    "

## 2018-04-19 NOTE — H&P
History and physical    Primary care physician  Dr. HERNANDEZ    Chief complaint  Epigastric pain  Nausea vomiting diarrhea  Black and bloody stools    History of present illness  52-year-old white male with history of coronary artery disease diabetes mellitus hypertension hypothyroidism hyperlipidemia obstructive sleep apnea vitamin D deficiency who is morbidly obese presented to [facility] emergency room with epigastric pain started yesterday followed by nausea vomiting diarrhea.  Patient also have notice black or blood in the stools for the last several days to weeks and has been followed by GI.  Patient denies any blood in the vomitus.  Patient denies any fever chills.  Patient evaluated in ER found to have enterocolitis admitted for management    PAST MEDICAL HISTORY    • Coronary artery disease     • Cryptococcal pneumonitis 2010   • Diabetes mellitus     • Disease of thyroid gland     • Hyperlipidemia     • Hypothyroidism     • ANNEL on CPAP     • Pulmonary nodule     • Testosterone deficiency     • Type 2 diabetes mellitus     • Vitamin D deficiency        PAST SURGICAL HISTORY  Surgical History         Past Surgical History:   Procedure Laterality Date   • ANTERIOR CERVICAL CORPECTOMY W/ FUSION   2006     C2-3   • ANTERIOR CERVICAL DISCECTOMY W/ FUSION   2012     C2-T2 PREVIOUS HARDWARE REMOVED AND REVISED   • ANTERIOR CERVICAL FUSION   2008     C3-5   • CARDIAC CATHETERIZATION N/A 6/16/2016     Procedure: Coronary angiography;  Surgeon: Emiliano Gordon MD;  Location: [facility] CATH INVASIVE LOCATION;  Service:    • CARDIAC CATHETERIZATION N/A 6/16/2016     Procedure: Left Heart Cath;  Surgeon: Emiliano Gordon MD;  Location: [facility] CATH INVASIVE LOCATION;  Service:    • CARDIAC CATHETERIZATION N/A 6/16/2016     Procedure: Left ventriculography;  Surgeon: Emiliano Gordon MD;  Location: [facility] CATH INVASIVE LOCATION;  Service:    • CORONARY ANGIOPLASTY   01/2015   • CORONARY STENT  "PLACEMENT       • HERNIA REPAIR       • KNEE ARTHROSCOPY             FAMILY HISTORY        Family History   Problem Relation Age of Onset   • Heart disease Mother     • Hypertension Mother     • Hypertension Father     • Heart disease Father        SOCIAL HISTORY  Social History   Social History            Social History   • Marital status:        Spouse name: N/A   • Number of children: N/A   • Years of education: N/A          Occupational History   • Not on file.           Social History Main Topics   • Smoking status: Never Smoker   • Smokeless tobacco: Never Used   • Alcohol use No   • Drug use: No   • Sexual activity: Defer           Other Topics Concern   • Not on file          Social History Narrative   • No narrative on file         ALLERGIES  Adhesive tape    Medications reviewed     REVIEW OF SYSTEMS  Review of Systems   Constitutional: Negative for activity change, appetite change and fever.   HENT: Negative for congestion and sore throat.    Eyes: Negative.    Respiratory: Negative for cough and shortness of breath.    Cardiovascular: Negative for chest pain and leg swelling.   Gastrointestinal: Positive for abdominal pain (upper), diarrhea, nausea and vomiting.   Endocrine: Negative.    Genitourinary: Negative for decreased urine volume and dysuria.   Musculoskeletal: Negative for neck pain.   Skin: Negative for rash and wound.   Allergic/Immunologic: Negative.    Neurological: Negative for weakness, numbness and headaches.   Hematological: Negative.    Psychiatric/Behavioral: Negative.    All other systems reviewed and are negative.     PHYSICAL EXAM  Blood pressure 99/66, pulse 82, temperature 97.5 °F (36.4 °C), temperature source Oral, resp. rate 16, height 185.4 cm (72.99\"), weight 132 kg (290 lb), SpO2 95 %.    Constitutional: He is oriented to person, place, and time and well-developed, well-nourished, and in no distress.   Head: Normocephalic and atraumatic.   Eyes: EOM are normal. Pupils " are equal, round, and reactive to light.   Neck: Normal range of motion. Neck supple.   Cardiovascular: Normal rate, regular rhythm and normal heart sounds.    Pulmonary/Chest: Effort normal and breath sounds normal. No respiratory distress.   Abdominal: Soft. There is tenderness in the epigastric area. There is no rebound and no guarding.   Musculoskeletal: Normal range of motion. He exhibits no edema.   Neurological: He is alert and oriented to person, place, and time. He has normal sensation and normal strength.   Skin: Skin is warm and dry.   Psychiatric: Mood and affect normal.     LAB RESULTS  Lab Results (last 24 hours)     Procedure Component Value Units Date/Time    CBC & Differential [[id number]] Collected:  04/19/18 0050    Specimen:  Blood Updated:  04/19/18 0202    Narrative:       The following orders were created for panel order CBC & Differential.  Procedure                               Abnormality         Status                     ---------                               -----------         ------                     Scan Slide[[id number]]                                                                  CBC Auto Differential[[id number]]        Abnormal            Final result                 Please view results for these tests on the individual orders.    CBC Auto Differential [[id number]]  (Abnormal) Collected:  04/19/18 0137    Specimen:  Blood Updated:  04/19/18 0202     WBC 16.49 (H) 10*3/mm3      RBC 4.99 10*6/mm3      Hemoglobin 15.7 g/dL      Hematocrit 46.7 %      MCV 93.6 fL      MCH 31.5 pg      MCHC 33.6 g/dL      RDW 13.5 %      RDW-SD 45.5 fl      MPV 10.3 fL      Platelets 217 10*3/mm3      Neutrophil % 89.3 (H) %      Lymphocyte % 4.4 (L) %      Monocyte % 4.7 (L) %      Eosinophil % 1.2 %      Basophil % 0.1 %      Immature Grans % 0.3 %      Neutrophils, Absolute 14.74 (H) 10*3/mm3      Lymphocytes, Absolute 0.72 (L) 10*3/mm3      Monocytes, Absolute 0.78 10*3/mm3      Eosinophils,  Absolute 0.19 10*3/mm3      Basophils, Absolute 0.01 10*3/mm3      Immature Grans, Absolute 0.05 (H) 10*3/mm3     Comprehensive Metabolic Panel [[id number]]  (Abnormal) Collected:  04/19/18 0050    Specimen:  Blood Updated:  04/19/18 0122     Glucose 167 (H) mg/dL      BUN 13 mg/dL      Creatinine 0.68 (L) mg/dL      Sodium 138 mmol/L      Potassium 4.7 mmol/L      Chloride 101 mmol/L      CO2 22.3 mmol/L      Calcium 8.9 mg/dL      Total Protein 7.3 g/dL      Albumin 4.20 g/dL      ALT (SGPT) 24 U/L      AST (SGOT) 21 U/L      Alkaline Phosphatase 88 U/L      Total Bilirubin 1.0 mg/dL      eGFR Non African Amer 122 mL/min/1.73      Globulin 3.1 gm/dL      A/G Ratio 1.4 g/dL      BUN/Creatinine Ratio 19.1     Anion Gap 14.7 mmol/L     Lipase [[id number]]  (Normal) Collected:  04/19/18 0050    Specimen:  Blood Updated:  04/19/18 0122     Lipase 14 U/L     Troponin [[id number]]  (Normal) Collected:  04/19/18 0050    Specimen:  Blood Updated:  04/19/18 0122     Troponin T <0.010 ng/mL     Narrative:       Troponin T Reference Ranges:  Less than 0.03 ng/mL:    Negative for AMI  0.03 to 0.09 ng/mL:      Indeterminant for AMI  Greater than 0.09 ng/mL: Positive for AMI    BNP [[id number]]  (Normal) Collected:  04/19/18 0050    Specimen:  Blood Updated:  04/19/18 0120     proBNP 5.2 pg/mL     Narrative:       Among patients with dyspnea, NT-proBNP is highly sensitive for the detection of acute congestive heart failure. In addition NT-proBNP of <300 pg/ml effectively rules out acute congestive heart failure with 99% negative predictive value.    Protime-INR [[id number]]  (Normal) Collected:  04/19/18 0050    Specimen:  Blood Updated:  04/19/18 0120     Protime 12.7 Seconds      INR 0.97    aPTT [[id number]]  (Normal) Collected:  04/19/18 0050    Specimen:  Blood Updated:  04/19/18 0120     PTT 26.0 seconds         Imaging Results (last 24 hours)     Procedure Component Value Units Date/Time    CT Abdomen Pelvis With Contrast  [[id number]] Collected:  04/19/18 0248     Updated:  04/19/18 0248    Narrative:       CT ABDOMEN AND PELVIS WITH CONTRAST.     TECHNIQUE: Radiation dose reduction techniques were utilized, including  automated exposure control and exposure modulation based on body size. A  routine CT scan of the abdomen and pelvis was performed with coronal and  sagittal reconstructed images.     HISTORY: Unspecified abdominal pain, nausea and vomiting.     COMPARISON: 7/21/2016.     FINDINGS:  Lung bases demonstrate no consolidation or effusion. 1.3 cm nodule in  the left lung base is unchanged.      Liver demonstrates homogeneous parenchyma, no definite mass.  Unremarkable gallbladder. No intra or extrahepatic biliary ductal  dilatation.      Spleen is unremarkable. Pancreas is unremarkable, no pancreatic ductal  dilatation. Adrenal glands are within normal limits.     No definite renal calculi. Kidneys do not demonstrate hydronephrosis.  Left kidney peripelvic cysts. Urinary bladder is unremarkable.         Mild distention of small and large bowel loops with fluid, with mild  mural thickening and multiple air-fluid levels. Appendix is normal.         No significant retroperitoneal lymphadenopathy. Bilateral fatty inguinal  hernias. Hernia repair changes of the right groin.          Impression:       1.  Mild to moderately severe enterocolitis is suspected, no  obstruction, perforation or abscess.   2.  1.3 cm nodule at the left lung base is unchanged. Six-month  follow-up is recommended.          XR Chest 2 View [[id number]] Collected:  04/19/18 0038     Updated:  04/19/18 0038    Narrative:       CHEST PA AND LATERAL.     HISTORY: Chest pain.     COMPARISON: 3/21/2017.     FINDINGS:  Cardiomediastinal silhouette is within normal limits.         There is no consolidation or effusion. 1.6 cm ovoid nodule in the left  lung base remains unchanged. Calcified granuloma in the left midlung  zone.          Impression:       No acute  findings.             EKG                              Rhythm/Rate: NSR 85  P waves and ME: nml  QRS, axis: widened QRS, RBBB, RAD  ST and T waves: nml       Current Facility-Administered Medications:   •  atorvastatin (LIPITOR) tablet 10 mg, 10 mg, Oral, Daily, Toña Madden MD  •  cholecalciferol (VITAMIN D3) tablet 1,000 Units, 1,000 Units, Oral, Daily, Toña Madden MD  •  famotidine (PEPCID) injection 20 mg, 20 mg, Intravenous, Q12H, Toña Madden MD, 20 mg at 04/19/18 1035  •  HYDROmorphone (DILAUDID) injection 0.5 mg, 0.5 mg, Intravenous, Q4H PRN, Toña Madden MD, 0.5 mg at 04/19/18 0759  •  levoFLOXacin (LEVAQUIN) 750 mg/150 mL D5W (premix) (LEVAQUIN) 750 mg, 750 mg, Intravenous, Q24H, Toña Madden MD, 750 mg at 04/19/18 1029  •  levothyroxine (SYNTHROID, LEVOTHROID) tablet 125 mcg, 125 mcg, Oral, Daily, Toña Madden MD  •  metroNIDAZOLE (FLAGYL) IVPB 500 mg, 500 mg, Intravenous, Q8H, Toña Madden MD  •  ondansetron (ZOFRAN) injection 4 mg, 4 mg, Intravenous, Q4H PRN, Toña Madden MD, 4 mg at 04/19/18 0759  •  Insert peripheral IV, , , Once **AND** sodium chloride 0.9 % flush 10 mL, 10 mL, Intravenous, PRN, Joe Phillips MD  •  sodium chloride 0.9 % infusion, 75 mL/hr, Intravenous, Continuous, Toña Madden MD, Last Rate: 75 mL/hr at 04/19/18 0759, 75 mL/hr at 04/19/18 0759  •  vitamin C (ASCORBIC ACID) tablet 1,000 mg, 1,000 mg, Oral, Daily, oTña Madden MD     ASSESSMENT  Acute severe enterocolitis probably infectious  Bloody stools  Diabetes mellitus  Hypertension  Hyperlipidemia  Hypothyroidism  Gastroesophageal reflux disease  Obstructive sleep apnea  Morbidly obese  History of lung nodule    PLAN  Admit  IV fluid  IV antibiotics  GI consult  Hold aspirin  Accu-Chek sliding scale insulin  Continue home meds except for aspirin and adjust the doses  Stress ulcer DVT prophylaxis  Follow closely further recommendation according to hospital course    TOÑA MADDEN MD

## 2018-04-19 NOTE — PROGRESS NOTES
Discharge Planning Assessment  [facility]     Patient Name: Chetan Grimm  MRN: [id number]  Today's Date: 4/19/2018    Admit Date: 4/19/2018          Discharge Needs Assessment     Row Name 04/19/18 1706       Living Environment    Lives With spouse    Name(s) of Who Lives With Patient Kerri Grimm [phone]    Current Living Arrangements home/apartment/condo    Primary Care Provided by self    Provides Primary Care For no one    Family Caregiver if Needed spouse    Family Caregiver Names Kerri Grimm [phone]    Quality of Family Relationships helpful;supportive;involved    Able to Return to Prior Arrangements yes       Resource/Environmental Concerns    Resource/Environmental Concerns none    Transportation Concerns car, none       Transition Planning    Patient/Family Anticipates Transition to home with family    Patient/Family Anticipated Services at Transition none;home health care    Transportation Anticipated family or friend will provide       Discharge Needs Assessment    Concerns to be Addressed no discharge needs identified;denies needs/concerns at this time    Equipment Currently Used at Home bipap/cpap;glucometer    Anticipated Changes Related to Illness none    Equipment Needed After Discharge none    Outpatient/Agency/Support Group Needs homecare agency    Discharge Facility/Level of Care Needs home with home health    Offered/Gave Vendor List yes    Patient's Choice of Community Agency(s) [facility] or [facility] if needed.            Discharge Plan     Row Name 04/19/18 1725       Plan    Plan Home; currently denies any d/c needs.  If IV antibiotics are needed patient is agreeable to [facility] or [facility].    Patient/Family in Agreement with Plan yes    Plan Comments Met with the patient at bedside; explained role of CCP, verified facesheet and discussed discharge planning needs.  The patient resides at home with his wife who can assist him as needed.  He uses a cpap, has no steps to enter  his first floor apartment.  His PCP is Vanessa Jennings, pharmacy is [facility] on [address] and he denies any trouble remembering to take his medication or with affording his medication.  The patient's wife will transport him home upon d/c and he states that he does not have any POA documents.  He is unsure who he has used for HH in the past and he denies any SNF history, he was provided with a HH/SNF list, currently denies any d/c needs and and CCP will follow to see if the patient is d/c on any IV antibiotics and if so the patient is agreeable to using either [facility] or [facility] if needed.  Mee Rico, [facility]        Destination     No service coordination in this encounter.      Durable Medical Equipment     No service coordination in this encounter.      Dialysis/Infusion     No service coordination in this encounter.      Home Medical Care     No service coordination in this encounter.      Social Care     No service coordination in this encounter.                Demographic Summary     Row Name 04/19/18 2012       General Information    Admission Type inpatient    Arrived From home    Referral Source physician;nursing    Reason for Consult discharge planning    Preferred Language English     Used During This Interaction no            Functional Status     Row Name 04/19/18 6517       Functional Status    Usual Activity Tolerance excellent    Current Activity Tolerance moderate       Functional Status, IADL    Medications independent    Meal Preparation independent    Housekeeping independent    Laundry independent    Shopping independent       Mental Status    General Appearance WDL WDL       Mental Status Summary    Recent Changes in Mental Status/Cognitive Functioning no changes            Psychosocial    No documentation.           Abuse/Neglect    No documentation.           Legal    No documentation.           Substance Abuse    No documentation.           Patient Forms     Row Name 04/19/18  1655       Patient Forms    Provider Choice List Delivered    Delivered to Patient    Method of delivery In person          HATTIE Soto

## 2018-04-19 NOTE — ED PROVIDER NOTES
EMERGENCY DEPARTMENT ENCOUNTER    CHIEF COMPLAINT  Chief Complaint: Abdominal pain   History given by: patient   History limited by: n/a  Room Number: P486/1  PMD: Vanessa Jennings MD      HPI:  Pt is a 52 y.o. male who presents complaining of upper abd pain that began today. Pt states that the pain radiates up into his chest and into his back. Pt also complains of nausea, vomiting, and diarrhea. Pt describes the diarrhea as watery stool, and denies blood in the stool. Pt state that he took Zofran x2 without relief. Hx of pancreatitis     Duration:  1 day  Onset: gradual  Timing: constant   Location: upper abd   Radiation: into the chest and back  Quality: pain  Intensity/Severity: moderate  Progression: unchanged   Associated Symptoms: N/V/D  Aggravating Factors: none  Alleviating Factors: none  Previous Episodes: hx of pancreatitis    Treatment before arrival: Zofran x2    PAST MEDICAL HISTORY  Active Ambulatory Problems     Diagnosis Date Noted   • Diabetes mellitus 05/31/2016   • Hypersomnia with sleep apnea    • ANNEL on CPAP    • Hypercholesterolemia 11/12/2012   • Primary hypothyroidism 12/07/2016   • Hyperlipidemia 12/07/2016   • Chronic pain 12/07/2016   • Low testosterone 12/19/2016   • Vitamin D deficiency 12/19/2016   • Hypogonadism in male 12/26/2016   • Chronic coronary artery disease 03/29/2017   • Obesity (BMI 30-39.9) 04/16/2018   • Benign essential HTN 04/16/2018     Resolved Ambulatory Problems     Diagnosis Date Noted   • No Resolved Ambulatory Problems     Past Medical History:   Diagnosis Date   • Coronary artery disease    • Cryptococcal pneumonitis 2010   • Diabetes mellitus    • Disease of thyroid gland    • Hyperlipidemia    • Hypothyroidism    • ANNEL on CPAP    • Pulmonary nodule    • Testosterone deficiency    • Type 2 diabetes mellitus    • Vitamin D deficiency        PAST SURGICAL HISTORY  Past Surgical History:   Procedure Laterality Date   • ANTERIOR CERVICAL CORPECTOMY W/ FUSION  2006     C2-3   • ANTERIOR CERVICAL DISCECTOMY W/ FUSION  2012    C2-T2 PREVIOUS HARDWARE REMOVED AND REVISED   • ANTERIOR CERVICAL FUSION  2008    C3-5   • CARDIAC CATHETERIZATION N/A 6/16/2016    Procedure: Coronary angiography;  Surgeon: Emiliano Gordon MD;  Location:  [facility];  Service:    • CARDIAC CATHETERIZATION N/A 6/16/2016    Procedure: Left Heart Cath;  Surgeon: Emiliano Gordon MD;  Location:  [facility];  Service:    • CARDIAC CATHETERIZATION N/A 6/16/2016    Procedure: Left ventriculography;  Surgeon: Emiliano Gordon MD;  Location:  [facility];  Service:    • CORONARY ANGIOPLASTY  01/2015   • CORONARY STENT PLACEMENT     • HERNIA REPAIR     • KNEE ARTHROSCOPY         FAMILY HISTORY  Family History   Problem Relation Age of Onset   • Heart disease Mother    • Hypertension Mother    • Hypertension Father    • Heart disease Father        SOCIAL HISTORY  Social History     Social History   • Marital status:      Spouse name: N/A   • Number of children: N/A   • Years of education: N/A     Occupational History   • Not on file.     Social History Main Topics   • Smoking status: Never Smoker   • Smokeless tobacco: Never Used   • Alcohol use No   • Drug use: No   • Sexual activity: Defer     Other Topics Concern   • Not on file     Social History Narrative   • No narrative on file       ALLERGIES  Adhesive tape    REVIEW OF SYSTEMS  Review of Systems   Constitutional: Negative for activity change, appetite change and fever.   HENT: Negative for congestion and sore throat.    Eyes: Negative.    Respiratory: Negative for cough and shortness of breath.    Cardiovascular: Negative for chest pain and leg swelling.   Gastrointestinal: Positive for abdominal pain (upper), diarrhea, nausea and vomiting.   Endocrine: Negative.    Genitourinary: Negative for decreased urine volume and dysuria.   Musculoskeletal: Negative for neck pain.   Skin:  Negative for rash and wound.   Allergic/Immunologic: Negative.    Neurological: Negative for weakness, numbness and headaches.   Hematological: Negative.    Psychiatric/Behavioral: Negative.    All other systems reviewed and are negative.      PHYSICAL EXAM  ED Triage Vitals   Temp Heart Rate Resp BP SpO2   04/19/18 0025 04/19/18 0003 04/19/18 0003 04/19/18 0025 04/19/18 0003   98.4 °F (36.9 °C) 95 20 149/93 99 %      Temp src Heart Rate Source Patient Position BP Location FiO2 (%)   04/19/18 0025 04/19/18 0003 04/19/18 0025 04/19/18 0025 --   Oral Monitor Lying Right arm        Physical Exam   Constitutional: He is oriented to person, place, and time and well-developed, well-nourished, and in no distress.   HENT:   Head: Normocephalic and atraumatic.   Eyes: EOM are normal. Pupils are equal, round, and reactive to light.   Neck: Normal range of motion. Neck supple.   Cardiovascular: Normal rate, regular rhythm and normal heart sounds.    Pulmonary/Chest: Effort normal and breath sounds normal. No respiratory distress.   Abdominal: Soft. There is tenderness in the epigastric area. There is no rebound and no guarding.   Musculoskeletal: Normal range of motion. He exhibits no edema.   Neurological: He is alert and oriented to person, place, and time. He has normal sensation and normal strength.   Skin: Skin is warm and dry.   Psychiatric: Mood and affect normal.   Nursing note and vitals reviewed.      LAB RESULTS  Lab Results (last 24 hours)     Procedure Component Value Units Date/Time    CBC & Differential [[id number]] Collected:  04/19/18 0050    Specimen:  Blood Updated:  04/19/18 0202    Narrative:       The following orders were created for panel order CBC & Differential.  Procedure                               Abnormality         Status                     ---------                               -----------         ------                     Scan Slide[[id number]]                                                                   CBC Auto Differential[[id number]]        Abnormal            Final result                 Please view results for these tests on the individual orders.    Comprehensive Metabolic Panel [[id number]]  (Abnormal) Collected:  04/19/18 0050    Specimen:  Blood Updated:  04/19/18 0122     Glucose 167 (H) mg/dL      BUN 13 mg/dL      Creatinine 0.68 (L) mg/dL      Sodium 138 mmol/L      Potassium 4.7 mmol/L      Chloride 101 mmol/L      CO2 22.3 mmol/L      Calcium 8.9 mg/dL      Total Protein 7.3 g/dL      Albumin 4.20 g/dL      ALT (SGPT) 24 U/L      AST (SGOT) 21 U/L      Alkaline Phosphatase 88 U/L      Total Bilirubin 1.0 mg/dL      eGFR Non African Amer 122 mL/min/1.73      Globulin 3.1 gm/dL      A/G Ratio 1.4 g/dL      BUN/Creatinine Ratio 19.1     Anion Gap 14.7 mmol/L     Protime-INR [[id number]]  (Normal) Collected:  04/19/18 0050    Specimen:  Blood Updated:  04/19/18 0120     Protime 12.7 Seconds      INR 0.97    aPTT [[id number]]  (Normal) Collected:  04/19/18 0050    Specimen:  Blood Updated:  04/19/18 0120     PTT 26.0 seconds     Lipase [[id number]]  (Normal) Collected:  04/19/18 0050    Specimen:  Blood Updated:  04/19/18 0122     Lipase 14 U/L     Troponin [[id number]]  (Normal) Collected:  04/19/18 0050    Specimen:  Blood Updated:  04/19/18 0122     Troponin T <0.010 ng/mL     Narrative:       Troponin T Reference Ranges:  Less than 0.03 ng/mL:    Negative for AMI  0.03 to 0.09 ng/mL:      Indeterminant for AMI  Greater than 0.09 ng/mL: Positive for AMI    BNP [[id number]]  (Normal) Collected:  04/19/18 0050    Specimen:  Blood Updated:  04/19/18 0120     proBNP 5.2 pg/mL     Narrative:       Among patients with dyspnea, NT-proBNP is highly sensitive for the detection of acute congestive heart failure. In addition NT-proBNP of <300 pg/ml effectively rules out acute congestive heart failure with 99% negative predictive value.    CBC Auto Differential [[id number]]  (Abnormal) Collected:   04/19/18 0137    Specimen:  Blood Updated:  04/19/18 0202     WBC 16.49 (H) 10*3/mm3      RBC 4.99 10*6/mm3      Hemoglobin 15.7 g/dL      Hematocrit 46.7 %      MCV 93.6 fL      MCH 31.5 pg      MCHC 33.6 g/dL      RDW 13.5 %      RDW-SD 45.5 fl      MPV 10.3 fL      Platelets 217 10*3/mm3      Neutrophil % 89.3 (H) %      Lymphocyte % 4.4 (L) %      Monocyte % 4.7 (L) %      Eosinophil % 1.2 %      Basophil % 0.1 %      Immature Grans % 0.3 %      Neutrophils, Absolute 14.74 (H) 10*3/mm3      Lymphocytes, Absolute 0.72 (L) 10*3/mm3      Monocytes, Absolute 0.78 10*3/mm3      Eosinophils, Absolute 0.19 10*3/mm3      Basophils, Absolute 0.01 10*3/mm3      Immature Grans, Absolute 0.05 (H) 10*3/mm3           I ordered the above labs and reviewed the results    RADIOLOGY  CT Abdomen Pelvis With Contrast   Preliminary Result   1.  Mild to moderately severe enterocolitis is suspected, no   obstruction, perforation or abscess.    2.  1.3 cm nodule at the left lung base is unchanged. Six-month   follow-up is recommended.              XR Chest 2 View   Preliminary Result   No acute findings.                   I ordered the above noted radiological studies. Interpreted by radiologist. Reviewed by me in PACS.       PROCEDURES  Procedures    EKG           EKG time: 00:06  Rhythm/Rate: NSR 85  P waves and CT: nml  QRS, axis: widened QRS, RBBB, RAD  ST and T waves: nml     Interpreted Contemporaneously by me, independently viewed  Unchanged compared to prior 3/2017    PROGRESS AND CONSULTS  ED Course     00:16  CXR, EKG, PT/INR, lipase, troponin, BNP, CMP, and CBC ordered.     00:55  BP- 149/93 HR- 95 Temp- 98.4 °F (36.9 °C) (Oral) O2 sat- 99%   Advised pt of the plan to treat pain and nausea. Will order CT abd/pelvis. Pt understands and agrees with the plan, all questions answered.    01:09  CT abd/pelvis ordered. IVF ordered for hydration. Dilaudid and Zofran ordered to treat pain and nausea.     03:01  BP- 149/93 HR- 95  Temp- 98.4 °F (36.9 °C) (Oral) O2 sat- 99%  Rechecked the patient. Pt reports that the pain and nausea have not improved. Informed pt that the CT shows enterocolitis. Informed him of the plan for admission for IVF and pain/nausea control. Pt understands and agrees with the plan, all questions answered.    03;16  Call placed to [facility] for admission. Flagyl ordered for abx coverage. Zofran ordered to treat nausea.     03:24  Discussed pt's case with Dr Madden ([facility]), who agrees to admit the pt.     MEDICAL DECISION MAKING  Results were reviewed/discussed with the patient and they were also made aware of online access. Pt also made aware that some labs, such as cultures, will not be resulted during ER visit and follow up with PMD is necessary.     MDM  Number of Diagnoses or Management Options     Amount and/or Complexity of Data Reviewed  Clinical lab tests: ordered and reviewed (WBC=16.49)  Tests in the radiology section of CPT®: ordered and reviewed (CT abd/pelvis shows moderate to severe enterocolitis)  Tests in the medicine section of CPT®: ordered and reviewed (See EKG procedure note. )  Discuss the patient with other providers: yes (Dr Madden, [facility])    Patient Progress  Patient progress: stable         DIAGNOSIS  Final diagnoses:   Colitis   Enteritis   Nausea vomiting and diarrhea       DISPOSITION  ADMISSION    Discussed treatment plan and reason for admission with pt/family and admitting physician.  Pt/family voiced understanding of the plan for admission for further testing/treatment as needed.     Latest Documented Vital Signs:  As of 6:14 AM  BP- 138/87 HR- 82 Temp- 97 °F (36.1 °C) (Oral) O2 sat- 98%    --  Documentation assistance provided by melvin Martinez for Dr Phillips.  Information recorded by the melvin was done at my direction and has been verified and validated by me.        Jie Martinez  04/19/18 4535       Joe Phillips MD  04/19/18 3382

## 2018-04-19 NOTE — ED NOTES
Pt presents to ED with c/o severe upper abdominal pain and chest pain with pain radiating into left arm. This has been intermittent since 100 today.  Pt states he has also been having n/v along with diarrhea.  Pt did have stents placed in LAD approx 3 years ago     Domonique Hernandez, RN  04/19/18 0001

## 2018-04-20 LAB
ALBUMIN SERPL-MCNC: 3.3 G/DL (ref 3.5–5.2)
ALBUMIN/GLOB SERPL: 1.3 G/DL
ALP SERPL-CCNC: 77 U/L (ref 39–117)
ALT SERPL W P-5'-P-CCNC: 19 U/L (ref 1–41)
ANION GAP SERPL CALCULATED.3IONS-SCNC: 11 MMOL/L
AST SERPL-CCNC: 14 U/L (ref 1–40)
BASOPHILS # BLD AUTO: 0.01 10*3/MM3 (ref 0–0.2)
BASOPHILS NFR BLD AUTO: 0.1 % (ref 0–1.5)
BILIRUB SERPL-MCNC: 0.8 MG/DL (ref 0.1–1.2)
BUN BLD-MCNC: 8 MG/DL (ref 6–20)
BUN/CREAT SERPL: 11.4 (ref 7–25)
CALCIUM SPEC-SCNC: 8.4 MG/DL (ref 8.6–10.5)
CHLORIDE SERPL-SCNC: 105 MMOL/L (ref 98–107)
CHOLEST SERPL-MCNC: 131 MG/DL (ref 0–200)
CO2 SERPL-SCNC: 25 MMOL/L (ref 22–29)
CREAT BLD-MCNC: 0.7 MG/DL (ref 0.76–1.27)
DEPRECATED RDW RBC AUTO: 46.4 FL (ref 37–54)
EOSINOPHIL # BLD AUTO: 0.26 10*3/MM3 (ref 0–0.7)
EOSINOPHIL NFR BLD AUTO: 3.7 % (ref 0.3–6.2)
ERYTHROCYTE [DISTWIDTH] IN BLOOD BY AUTOMATED COUNT: 13.6 % (ref 11.5–14.5)
GFR SERPL CREATININE-BSD FRML MDRD: 118 ML/MIN/1.73
GLOBULIN UR ELPH-MCNC: 2.6 GM/DL
GLUCOSE BLD-MCNC: 157 MG/DL (ref 65–99)
HBA1C MFR BLD: 7.9 % (ref 4.8–5.6)
HCT VFR BLD AUTO: 40.3 % (ref 40.4–52.2)
HDLC SERPL-MCNC: 26 MG/DL (ref 40–60)
HGB BLD-MCNC: 13.3 G/DL (ref 13.7–17.6)
IMM GRANULOCYTES # BLD: 0.03 10*3/MM3 (ref 0–0.03)
IMM GRANULOCYTES NFR BLD: 0.4 % (ref 0–0.5)
LDLC SERPL CALC-MCNC: 78 MG/DL (ref 0–100)
LDLC/HDLC SERPL: 2.99 {RATIO}
LYMPHOCYTES # BLD AUTO: 1.02 10*3/MM3 (ref 0.9–4.8)
LYMPHOCYTES NFR BLD AUTO: 14.6 % (ref 19.6–45.3)
MCH RBC QN AUTO: 31 PG (ref 27–32.7)
MCHC RBC AUTO-ENTMCNC: 33 G/DL (ref 32.6–36.4)
MCV RBC AUTO: 93.9 FL (ref 79.8–96.2)
MONOCYTES # BLD AUTO: 0.59 10*3/MM3 (ref 0.2–1.2)
MONOCYTES NFR BLD AUTO: 8.4 % (ref 5–12)
NEUTROPHILS # BLD AUTO: 5.09 10*3/MM3 (ref 1.9–8.1)
NEUTROPHILS NFR BLD AUTO: 72.8 % (ref 42.7–76)
NRBC BLD MANUAL-RTO: 0 /100 WBC (ref 0–0)
NT-PROBNP SERPL-MCNC: 18.4 PG/ML (ref 5–900)
PLATELET # BLD AUTO: 198 10*3/MM3 (ref 140–500)
PMV BLD AUTO: 10.4 FL (ref 6–12)
POTASSIUM BLD-SCNC: 3.8 MMOL/L (ref 3.5–5.2)
PROT SERPL-MCNC: 5.9 G/DL (ref 6–8.5)
RBC # BLD AUTO: 4.29 10*6/MM3 (ref 4.6–6)
SODIUM BLD-SCNC: 141 MMOL/L (ref 136–145)
TRIGL SERPL-MCNC: 136 MG/DL (ref 0–150)
TSH SERPL DL<=0.05 MIU/L-ACNC: 4.02 MIU/ML (ref 0.27–4.2)
VLDLC SERPL-MCNC: 27.2 MG/DL (ref 5–40)
WBC NRBC COR # BLD: 7 10*3/MM3 (ref 4.5–10.7)

## 2018-04-20 PROCEDURE — 25010000002 LEVOFLOXACIN PER 250 MG: Performed by: HOSPITALIST

## 2018-04-20 PROCEDURE — 83036 HEMOGLOBIN GLYCOSYLATED A1C: CPT | Performed by: HOSPITALIST

## 2018-04-20 PROCEDURE — 83880 ASSAY OF NATRIURETIC PEPTIDE: CPT | Performed by: HOSPITALIST

## 2018-04-20 PROCEDURE — 82962 GLUCOSE BLOOD TEST: CPT

## 2018-04-20 PROCEDURE — 80053 COMPREHEN METABOLIC PANEL: CPT | Performed by: HOSPITALIST

## 2018-04-20 PROCEDURE — 99232 SBSQ HOSP IP/OBS MODERATE 35: CPT | Performed by: INTERNAL MEDICINE

## 2018-04-20 PROCEDURE — 84443 ASSAY THYROID STIM HORMONE: CPT | Performed by: HOSPITALIST

## 2018-04-20 PROCEDURE — 85025 COMPLETE CBC W/AUTO DIFF WBC: CPT | Performed by: HOSPITALIST

## 2018-04-20 PROCEDURE — 80061 LIPID PANEL: CPT | Performed by: HOSPITALIST

## 2018-04-20 RX ORDER — PANTOPRAZOLE SODIUM 40 MG/1
40 TABLET, DELAYED RELEASE ORAL
Status: DISCONTINUED | OUTPATIENT
Start: 2018-04-20 | End: 2018-04-21 | Stop reason: HOSPADM

## 2018-04-20 RX ADMIN — HYDROCORTISONE 2.5%: 25 CREAM TOPICAL at 09:03

## 2018-04-20 RX ADMIN — METRONIDAZOLE 500 MG: 500 INJECTION, SOLUTION INTRAVENOUS at 04:17

## 2018-04-20 RX ADMIN — FAMOTIDINE 20 MG: 10 INJECTION INTRAVENOUS at 09:03

## 2018-04-20 RX ADMIN — OXYCODONE HYDROCHLORIDE AND ACETAMINOPHEN 1000 MG: 500 TABLET ORAL at 09:03

## 2018-04-20 RX ADMIN — SODIUM CHLORIDE 125 ML/HR: 9 INJECTION, SOLUTION INTRAVENOUS at 06:50

## 2018-04-20 RX ADMIN — LEVOTHYROXINE SODIUM 125 MCG: 125 TABLET ORAL at 06:48

## 2018-04-20 RX ADMIN — METRONIDAZOLE 500 MG: 500 INJECTION, SOLUTION INTRAVENOUS at 12:14

## 2018-04-20 RX ADMIN — LEVOFLOXACIN 750 MG: 5 INJECTION, SOLUTION INTRAVENOUS at 06:49

## 2018-04-20 RX ADMIN — PANTOPRAZOLE SODIUM 40 MG: 40 TABLET, DELAYED RELEASE ORAL at 18:00

## 2018-04-20 RX ADMIN — HYDROCORTISONE 2.5%: 25 CREAM TOPICAL at 20:36

## 2018-04-20 RX ADMIN — METRONIDAZOLE 500 MG: 500 INJECTION, SOLUTION INTRAVENOUS at 20:36

## 2018-04-20 RX ADMIN — ATORVASTATIN CALCIUM 10 MG: 10 TABLET, FILM COATED ORAL at 09:03

## 2018-04-20 RX ADMIN — VITAMIN D, TAB 1000IU (100/BT) 1000 UNITS: 25 TAB at 09:03

## 2018-04-20 NOTE — PLAN OF CARE
Problem: Patient Care Overview  Goal: Plan of Care Review  Outcome: Ongoing (interventions implemented as appropriate)   04/20/18 1706   Coping/Psychosocial   Patient Agreement with Plan of Care agrees   Plan of Care Review   Progress no change   OTHER   Outcome Summary No complaints today. Hoping to be dc'd tomorrow. Still need stool sample. Will continue to monitor.   Coping/Psychosocial   Plan of Care Reviewed With patient     Goal: Individualization and Mutuality  Outcome: Ongoing (interventions implemented as appropriate)    Goal: Discharge Needs Assessment  Outcome: Ongoing (interventions implemented as appropriate)    Goal: Interprofessional Rounds/Family Conf  Outcome: Ongoing (interventions implemented as appropriate)      Problem: Pain, Acute (Adult)  Goal: Acceptable Pain Control/Comfort Level  Outcome: Ongoing (interventions implemented as appropriate)

## 2018-04-20 NOTE — PROGRESS NOTES
"Daily progress note    Chief complaint  Doing much better  No new complaints  No BM yet  Tolerating liquid diet    History of present illness  52-year-old white male with history of coronary artery disease diabetes mellitus hypertension hypothyroidism hyperlipidemia obstructive sleep apnea vitamin D deficiency who is morbidly obese presented to [facility] emergency room with epigastric pain started yesterday followed by nausea vomiting diarrhea.  Patient also have notice black or blood in the stools for the last several days to weeks and has been followed by GI.  Patient denies any blood in the vomitus.  Patient denies any fever chills.  Patient evaluated in ER found to have enterocolitis admitted for management     REVIEW OF SYSTEMS  Review of Systems   Constitutional: Negative for activity change, appetite change and fever.   HENT: Negative for congestion and sore throat.    Eyes: Negative.    Respiratory: Negative for cough and shortness of breath.    Cardiovascular: Negative for chest pain and leg swelling.   Gastrointestinal: Positive for abdominal pain (upper), diarrhea, nausea and vomiting.   Endocrine: Negative.    Genitourinary: Negative for decreased urine volume and dysuria.   Musculoskeletal: Negative for neck pain.   Skin: Negative for rash and wound.   Allergic/Immunologic: Negative.    Neurological: Negative for weakness, numbness and headaches.   Hematological: Negative.    Psychiatric/Behavioral: Negative.    All other systems reviewed and are negative.     PHYSICAL EXAM  Blood pressure 119/67, pulse 65, temperature 97.3 °F (36.3 °C), temperature source Oral, resp. rate 16, height 185.4 cm (72.99\"), weight 132 kg (290 lb), SpO2 97 %.    Constitutional: He is oriented to person, place, and time and well-developed, well-nourished, and in no distress.   Head: Normocephalic and atraumatic.   Eyes: EOM are normal. Pupils are equal, round, and reactive to light.   Neck: Normal range of motion. " Neck supple.   Cardiovascular: Normal rate, regular rhythm and normal heart sounds.    Pulmonary/Chest: Effort normal and breath sounds normal. No respiratory distress.   Abdominal: Soft. There is tenderness in the epigastric area. There is no rebound and no guarding.   Musculoskeletal: Normal range of motion. He exhibits no edema.   Neurological: He is alert and oriented to person, place, and time. He has normal sensation and normal strength.   Skin: Skin is warm and dry.   Psychiatric: Mood and affect normal.     LAB RESULTS  Lab Results (last 24 hours)     Procedure Component Value Units Date/Time    TSH [[id number]]  (Normal) Collected:  04/20/18 0643    Specimen:  Blood Updated:  04/20/18 0732     TSH 4.020 mIU/mL     BNP [[id number]]  (Normal) Collected:  04/20/18 0643    Specimen:  Blood Updated:  04/20/18 0732     proBNP 18.4 pg/mL     Narrative:       Among patients with dyspnea, NT-proBNP is highly sensitive for the detection of acute congestive heart failure. In addition NT-proBNP of <300 pg/ml effectively rules out acute congestive heart failure with 99% negative predictive value.    Comprehensive Metabolic Panel [[id number]]  (Abnormal) Collected:  04/20/18 0643    Specimen:  Blood Updated:  04/20/18 0724     Glucose 157 (H) mg/dL      BUN 8 mg/dL      Creatinine 0.70 (L) mg/dL      Sodium 141 mmol/L      Potassium 3.8 mmol/L      Chloride 105 mmol/L      CO2 25.0 mmol/L      Calcium 8.4 (L) mg/dL      Total Protein 5.9 (L) g/dL      Albumin 3.30 (L) g/dL      ALT (SGPT) 19 U/L      AST (SGOT) 14 U/L      Alkaline Phosphatase 77 U/L      Total Bilirubin 0.8 mg/dL      eGFR Non African Amer 118 mL/min/1.73      Globulin 2.6 gm/dL      A/G Ratio 1.3 g/dL      BUN/Creatinine Ratio 11.4     Anion Gap 11.0 mmol/L     Lipid Panel [[id number]]  (Abnormal) Collected:  04/20/18 0643    Specimen:  Blood Updated:  04/20/18 0724     Total Cholesterol 131 mg/dL      Triglycerides 136 mg/dL      HDL Cholesterol 26 (L)  mg/dL      LDL Cholesterol  78 mg/dL      VLDL Cholesterol 27.2 mg/dL      LDL/HDL Ratio 2.99    Narrative:       Cholesterol Reference Ranges  (U.S. Department of Health and Human Services ATP III Classifications)    Desirable          <200 mg/dL  Borderline High    200-239 mg/dL  High Risk          >240 mg/dL      Triglyceride Reference Ranges  (U.S. Department of Health and Human Services ATP III Classifications)    Normal           <150 mg/dL  Borderline High  150-199 mg/dL  High             200-499 mg/dL  Very High        >500 mg/dL    HDL Reference Ranges  (U.S. Department of Health and Human Services ATP III Classifcations)    Low     <40 mg/dl (major risk factor for CHD)  High    >60 mg/dl ('negative' risk factor for CHD)        LDL Reference Ranges  (U.S. Department of Health and Human Services ATP III Classifcations)    Optimal          <100 mg/dL  Near Optimal     100-129 mg/dL  Borderline High  130-159 mg/dL  High             160-189 mg/dL  Very High        >189 mg/dL    Hemoglobin A1c [[id number]]  (Abnormal) Collected:  04/20/18 0643    Specimen:  Blood Updated:  04/20/18 0711     Hemoglobin A1C 7.90 (H) %     Narrative:       Hemoglobin A1C Ranges:    Increased Risk for Diabetes  5.7% to 6.4%  Diabetes                     >= 6.5%  Diabetic Goal                < 7.0%    CBC & Differential [[id number]] Collected:  04/20/18 0643    Specimen:  Blood Updated:  04/20/18 0708    Narrative:       The following orders were created for panel order CBC & Differential.  Procedure                               Abnormality         Status                     ---------                               -----------         ------                     CBC Auto Differential[[id number]]        Abnormal            Final result                 Please view results for these tests on the individual orders.    CBC Auto Differential [[id number]]  (Abnormal) Collected:  04/20/18 0643    Specimen:  Blood Updated:  04/20/18 0708     WBC  7.00 10*3/mm3      RBC 4.29 (L) 10*6/mm3      Hemoglobin 13.3 (L) g/dL      Hematocrit 40.3 (L) %      MCV 93.9 fL      MCH 31.0 pg      MCHC 33.0 g/dL      RDW 13.6 %      RDW-SD 46.4 fl      MPV 10.4 fL      Platelets 198 10*3/mm3      Neutrophil % 72.8 %      Lymphocyte % 14.6 (L) %      Monocyte % 8.4 %      Eosinophil % 3.7 %      Basophil % 0.1 %      Immature Grans % 0.4 %      Neutrophils, Absolute 5.09 10*3/mm3      Lymphocytes, Absolute 1.02 10*3/mm3      Monocytes, Absolute 0.59 10*3/mm3      Eosinophils, Absolute 0.26 10*3/mm3      Basophils, Absolute 0.01 10*3/mm3      Immature Grans, Absolute 0.03 10*3/mm3      nRBC 0.0 /100 WBC         Imaging Results (last 24 hours)     Procedure Component Value Units Date/Time    CT Abdomen Pelvis With Contrast [[id number]] Collected:  04/19/18 0248     Updated:  04/20/18 0022    Narrative:       CT ABDOMEN AND PELVIS WITH CONTRAST.     TECHNIQUE: Radiation dose reduction techniques were utilized, including  automated exposure control and exposure modulation based on body size. A  routine CT scan of the abdomen and pelvis was performed with coronal and  sagittal reconstructed images.     HISTORY: Unspecified abdominal pain, nausea and vomiting.     COMPARISON: 7/21/2016.     FINDINGS:  Lung bases demonstrate no consolidation or effusion. 1.3 cm nodule in  the left lung base is unchanged.      Liver demonstrates homogeneous parenchyma, no definite mass.  Unremarkable gallbladder. No intra or extrahepatic biliary ductal  dilatation.      Spleen is unremarkable. Pancreas is unremarkable, no pancreatic ductal  dilatation. Adrenal glands are within normal limits.     No definite renal calculi. Kidneys do not demonstrate hydronephrosis.  Left renal peripelvic cysts. Urinary bladder is unremarkable.         Mild distention of small and large bowel loops with fluid, with mild  mural thickening and multiple air-fluid levels. Appendix is normal.         No significant  retroperitoneal lymphadenopathy. Bilateral fatty inguinal  hernias. Hernia repair changes of the right groin.          Impression:       1.  Mild to moderately severe enterocolitis is suspected, no  obstruction, perforation or abscess.   2.  1.3 cm nodule at the left lung base is unchanged. Six-month  follow-up is recommended.     This report was finalized on 4/20/2018 12:18 AM by Dr. Bertram Cortez MD.       XR Chest 2 View [[id number]] Collected:  04/19/18 0038     Updated:  04/19/18 2342    Narrative:       CHEST PA AND LATERAL.     HISTORY: Chest pain.     COMPARISON: 3/21/2017.     FINDINGS:  Cardiomediastinal silhouette is within normal limits.         There is no consolidation or effusion. 1.6 cm ovoid nodule in the left  lung base remains unchanged. Calcified granuloma in the left midlung  zone.          Impression:       No acute findings.         This report was finalized on 4/19/2018 11:39 PM by Dr. Bertram Cortez MD.         EKG                              Rhythm/Rate: NSR 85  P waves and UT: nml  QRS, axis: widened QRS, RBBB, RAD  ST and T waves: nml       Current Facility-Administered Medications:   •  atorvastatin (LIPITOR) tablet 10 mg, 10 mg, Oral, Daily, Ranjit Madden MD, 10 mg at 04/20/18 0903  •  cholecalciferol (VITAMIN D3) tablet 1,000 Units, 1,000 Units, Oral, Daily, Ranjit Madden MD, 1,000 Units at 04/20/18 0903  •  hydrocortisone (ANUSOL-HC) 2.5 % rectal cream, , Rectal, BID, DEREK Rachel  •  HYDROmorphone (DILAUDID) injection 0.5 mg, 0.5 mg, Intravenous, Q4H PRN, Ranjit Madden MD, 0.5 mg at 04/19/18 2004  •  levoFLOXacin (LEVAQUIN) 750 mg/150 mL D5W (premix) (LEVAQUIN) 750 mg, 750 mg, Intravenous, Q24H, Ranjit Madden MD, 750 mg at 04/20/18 0649  •  levothyroxine (SYNTHROID, LEVOTHROID) tablet 125 mcg, 125 mcg, Oral, QAM, Ranjit Madden MD, 125 mcg at 04/20/18 0600  •  metroNIDAZOLE (FLAGYL) IVPB 500 mg, 500 mg, Intravenous, Q8H, Ranjit Madden MD, 500 mg at 04/20/18 1214  •   ondansetron (ZOFRAN) injection 4 mg, 4 mg, Intravenous, Q4H PRN, Toña Madden MD, 4 mg at 04/19/18 1158  •  pantoprazole (PROTONIX) EC tablet 40 mg, 40 mg, Oral, BID AC, Gretchen Phillips, DEREK  •  promethazine (PHENERGAN) injection 12.5 mg, 12.5 mg, Intravenous, Q4H PRN, Toña Madden MD, 12.5 mg at 04/19/18 2012  •  Insert peripheral IV, , , Once **AND** sodium chloride 0.9 % flush 10 mL, 10 mL, Intravenous, PRN, Joe Phillips MD  •  vitamin C (ASCORBIC ACID) tablet 1,000 mg, 1,000 mg, Oral, Daily, Toña Madden MD, 1,000 mg at 04/20/18 0903     ASSESSMENT  Acute severe enterocolitis probably infectious  Bloody stools  Diabetes mellitus  Hypertension  Hyperlipidemia  Hypothyroidism  Gastroesophageal reflux disease  Obstructive sleep apnea  Morbidly obese  History of lung nodule    PLAN  CPM  IV fluid  IV antibiotics  GI consult appreciated  Advance diet as tolerated  Endoscopy as an outpatient once off antibiotics(  Hold aspirin  Accu-Chek sliding scale insulin  Continue home meds except for aspirin and adjust the doses  Stress ulcer DVT prophylaxis  Possible discharge in a.m. on oral antibiotics and tolerated regular diet    TOÑA MADDEN MD

## 2018-04-20 NOTE — PROGRESS NOTES
BGA/GI Progress Note   Chief Complaint:  Rectal bleeding, abnormal imaging, abdominal pain    Subjective     Interval History:   Ok for discharge.  Eyes nausea, vomiting, abdominal pain, or bright red blood per rectum.  He will follow up in our office next week on Tuesday morning.  History taken from: patient RN    Review of Systems:    The following systems were reviewed and negative;  gastrointestinal    Objective     Vital Signs  Temp:  [97.2 °F (36.2 °C)-97.5 °F (36.4 °C)] 97.3 °F (36.3 °C)  Heart Rate:  [65-76] 65  Resp:  [16-20] 16  BP: (100-119)/(56-73) 119/67  Body mass index is 38.27 kg/m².    Intake/Output Summary (Last 24 hours) at 04/20/18 1031  Last data filed at 04/20/18 0900   Gross per 24 hour   Intake          3714.92 ml   Output                0 ml   Net          3714.92 ml     I/O this shift:  In: 272.9 [I.V.:272.9]  Out: -     Physical Exam:   General: patient awake, alert and cooperative   Eyes: Normal lids and lashes, no scleral icterus, no conjunctival pallor   Neck: supple, normal ROM, no tracheal deviation, no thyromegaly   Skin: warm and dry, not jaundiced   Cardiovascular: regular rhythm and rate, no murmurs auscultated   Pulm: clear to auscultation bilaterally, regular and unlabored   Abdomen: soft, nontender, nondistended; normal bowel sounds   Rectal: deferred   Extremities: no rash or edema   Neurologic: Normal mood and behavior    All Medications Have Been Reviewed     Results Review:       Results from last 7 days  Lab Units 04/20/18  0643 04/19/18  0137   WBC 10*3/mm3 7.00 16.49*   HEMOGLOBIN g/dL 13.3* 15.7   HEMATOCRIT % 40.3* 46.7   PLATELETS 10*3/mm3 198 217         Results from last 7 days  Lab Units 04/20/18  0643 04/19/18  0050   SODIUM mmol/L 141 138   POTASSIUM mmol/L 3.8 4.7   CHLORIDE mmol/L 105 101   CO2 mmol/L 25.0 22.3   BUN mg/dL 8 13   CREATININE mg/dL 0.70* 0.68*   CALCIUM mg/dL 8.4* 8.9   BILIRUBIN mg/dL 0.8 1.0   ALK PHOS U/L 77 88   ALT (SGPT) U/L 19 24    AST (SGOT) U/L 14 21   GLUCOSE mg/dL 157* 167*         Results from last 7 days  Lab Units 04/19/18  0050   INR  0.97       RADIOLOGY:    Imaging Results (last 72 hours)     Procedure Component Value Units Date/Time    CT Abdomen Pelvis With Contrast [[id number]] Collected:  04/19/18 0248     Updated:  04/20/18 0022    Narrative:       CT ABDOMEN AND PELVIS WITH CONTRAST.     TECHNIQUE: Radiation dose reduction techniques were utilized, including  automated exposure control and exposure modulation based on body size. A  routine CT scan of the abdomen and pelvis was performed with coronal and  sagittal reconstructed images.     HISTORY: Unspecified abdominal pain, nausea and vomiting.     COMPARISON: 7/21/2016.     FINDINGS:  Lung bases demonstrate no consolidation or effusion. 1.3 cm nodule in  the left lung base is unchanged.      Liver demonstrates homogeneous parenchyma, no definite mass.  Unremarkable gallbladder. No intra or extrahepatic biliary ductal  dilatation.      Spleen is unremarkable. Pancreas is unremarkable, no pancreatic ductal  dilatation. Adrenal glands are within normal limits.     No definite renal calculi. Kidneys do not demonstrate hydronephrosis.  Left renal peripelvic cysts. Urinary bladder is unremarkable.         Mild distention of small and large bowel loops with fluid, with mild  mural thickening and multiple air-fluid levels. Appendix is normal.         No significant retroperitoneal lymphadenopathy. Bilateral fatty inguinal  hernias. Hernia repair changes of the right groin.          Impression:       1.  Mild to moderately severe enterocolitis is suspected, no  obstruction, perforation or abscess.   2.  1.3 cm nodule at the left lung base is unchanged. Six-month  follow-up is recommended.     This report was finalized on 4/20/2018 12:18 AM by Dr. Bertram Cortez MD.       XR Chest 2 View [[id number]] Collected:  04/19/18 0038     Updated:  04/19/18 2342    Narrative:       CHEST PA  AND LATERAL.     HISTORY: Chest pain.     COMPARISON: 3/21/2017.     FINDINGS:  Cardiomediastinal silhouette is within normal limits.         There is no consolidation or effusion. 1.6 cm ovoid nodule in the left  lung base remains unchanged. Calcified granuloma in the left midlung  zone.          Impression:       No acute findings.         This report was finalized on 4/19/2018 11:39 PM by Dr. Bertram Cortez MD.             Assessment/Plan     Patient Active Problem List   Diagnosis Code   • Diabetes mellitus E11.9   • Hypersomnia with sleep apnea G47.10, G47.30   • ANNEL on CPAP G47.33, Z99.89   • Hypercholesterolemia E78.00   • Primary hypothyroidism E03.9   • Hyperlipidemia E78.5   • Chronic pain G89.29   • Low testosterone E34.9   • Vitamin D deficiency E55.9   • Hypogonadism in male E29.1   • Chronic coronary artery disease I25.10   • Obesity (BMI 30-39.9) E66.9   • Benign essential HTN I10   • Colitis K52.9          Gretchen Phillips, APRN  04/20/18  10:31 AM    We are following for rectal bleeding, enterocolitis, abnormal imaging       Constitutional: He is oriented to person, place, and time. He appears well-developed and well-nourished.   HENT: anicteric and no thyromegaly  Head: Normocephalic and atraumatic.   Eyes: Conjunctivae and EOM are normal.   Neck: Normal range of motion. No tracheal deviation present.   Cardiovascular: Normal rate and regular rhythm.    Pulmonary/Chest: Effort normal and breath sounds normal. No respiratory distress.   Abdominal: Soft. Bowel sounds are normal. He exhibits no distension and no mass. There is no tenderness. There is no rebound and no guarding.   Musculoskeletal: Normal range of motion.   Neurological: He is alert and oriented to person, place, and time.   Skin: Skin is warm and dry.   Psychiatric: He has a normal mood and affect. Judgment normal.   Nursing note and vitals reviewed.        Impression/ Plan   1. Enterocolitis- gradual improvement in symptoms   2.  N/V/D- secondary to #1. Diarrhea resolved  3. Leukocytosis- resolved  4. Acid reflux symptoms  5. Chronic rectal bleeding with known large internal hemorrhoids- non bloody diarrhea yesterday  6. H/o anemia- Vitamin D deficiency and iron deficiency. Slight decrease in Hb but no overt bleeding.   7. Large IH and hyperplastic polyp- per colonoscopy 3/2017      Anusol suppositories for IH  Stool studies pending collection   Continue antibiotics after discharge  Monitor labs and H&H. Suspect decrease in Hb dilutional d/t absence of GI bleeding   Protonix BID at discharge   He will need EGD and colonoscopy in 8 weeks when acute issues resolve for symptoms and to r/o additional sources of his anemia.  If UGI symptoms persist this admission consider EGD prior to discharge.   Referral will be made to surgery as an outpatient for hemorrhoidectomy and hernia evaluation    Patient transferring GI care from [facility] to [facility].   Follow up appt with DEREK on 4/24/18 at 830am  GI will s/o and see in office      We are okay with discharge home

## 2018-04-20 NOTE — PLAN OF CARE
Problem: Patient Care Overview  Goal: Plan of Care Review  Outcome: Ongoing (interventions implemented as appropriate)   04/19/18 0512 04/19/18 2100   Coping/Psychosocial   Patient Agreement with Plan of Care --  agrees   Plan of Care Review   Progress --  no change   OTHER   Outcome Summary --  Medicated for pain and nausea today. IV abx given. Cont. to monitor and follow current orders   Coping/Psychosocial   Plan of Care Reviewed With patient;daughter --      Goal: Discharge Needs Assessment  Outcome: Ongoing (interventions implemented as appropriate)      Problem: Pain, Acute (Adult)  Goal: Acceptable Pain Control/Comfort Level  Outcome: Ongoing (interventions implemented as appropriate)

## 2018-04-20 NOTE — PLAN OF CARE
Problem: Patient Care Overview  Goal: Plan of Care Review  Outcome: Ongoing (interventions implemented as appropriate)   04/20/18 5526   Coping/Psychosocial   Patient Agreement with Plan of Care agrees   OTHER   Outcome Summary isolation maintained, no stool yet, no c/o, iv abt     Goal: Discharge Needs Assessment  Outcome: Ongoing (interventions implemented as appropriate)    Goal: Interprofessional Rounds/Family Conf  Outcome: Ongoing (interventions implemented as appropriate)      Problem: Pain, Acute (Adult)  Goal: Acceptable Pain Control/Comfort Level  Outcome: Ongoing (interventions implemented as appropriate)

## 2018-04-21 VITALS
SYSTOLIC BLOOD PRESSURE: 113 MMHG | DIASTOLIC BLOOD PRESSURE: 80 MMHG | OXYGEN SATURATION: 94 % | TEMPERATURE: 97.1 F | RESPIRATION RATE: 16 BRPM | HEART RATE: 67 BPM | BODY MASS INDEX: 38.43 KG/M2 | HEIGHT: 73 IN | WEIGHT: 290 LBS

## 2018-04-21 LAB
ANION GAP SERPL CALCULATED.3IONS-SCNC: 12.6 MMOL/L
BASOPHILS # BLD AUTO: 0.02 10*3/MM3 (ref 0–0.2)
BASOPHILS NFR BLD AUTO: 0.3 % (ref 0–1.5)
BUN BLD-MCNC: 9 MG/DL (ref 6–20)
BUN/CREAT SERPL: 11.7 (ref 7–25)
CALCIUM SPEC-SCNC: 8.3 MG/DL (ref 8.6–10.5)
CHLORIDE SERPL-SCNC: 101 MMOL/L (ref 98–107)
CO2 SERPL-SCNC: 24.4 MMOL/L (ref 22–29)
CREAT BLD-MCNC: 0.77 MG/DL (ref 0.76–1.27)
DEPRECATED RDW RBC AUTO: 45.3 FL (ref 37–54)
EOSINOPHIL # BLD AUTO: 0.26 10*3/MM3 (ref 0–0.7)
EOSINOPHIL NFR BLD AUTO: 4.1 % (ref 0.3–6.2)
ERYTHROCYTE [DISTWIDTH] IN BLOOD BY AUTOMATED COUNT: 13.3 % (ref 11.5–14.5)
GFR SERPL CREATININE-BSD FRML MDRD: 106 ML/MIN/1.73
GLUCOSE BLD-MCNC: 211 MG/DL (ref 65–99)
GLUCOSE BLDC GLUCOMTR-MCNC: 148 MG/DL (ref 70–130)
GLUCOSE BLDC GLUCOMTR-MCNC: 205 MG/DL (ref 70–130)
GLUCOSE BLDC GLUCOMTR-MCNC: 227 MG/DL (ref 70–130)
HCT VFR BLD AUTO: 39.5 % (ref 40.4–52.2)
HGB BLD-MCNC: 13.1 G/DL (ref 13.7–17.6)
IMM GRANULOCYTES # BLD: 0.02 10*3/MM3 (ref 0–0.03)
IMM GRANULOCYTES NFR BLD: 0.3 % (ref 0–0.5)
LYMPHOCYTES # BLD AUTO: 1.27 10*3/MM3 (ref 0.9–4.8)
LYMPHOCYTES NFR BLD AUTO: 19.9 % (ref 19.6–45.3)
MCH RBC QN AUTO: 31.1 PG (ref 27–32.7)
MCHC RBC AUTO-ENTMCNC: 33.2 G/DL (ref 32.6–36.4)
MCV RBC AUTO: 93.8 FL (ref 79.8–96.2)
MONOCYTES # BLD AUTO: 0.58 10*3/MM3 (ref 0.2–1.2)
MONOCYTES NFR BLD AUTO: 9.1 % (ref 5–12)
NEUTROPHILS # BLD AUTO: 4.23 10*3/MM3 (ref 1.9–8.1)
NEUTROPHILS NFR BLD AUTO: 66.3 % (ref 42.7–76)
PLATELET # BLD AUTO: 193 10*3/MM3 (ref 140–500)
PMV BLD AUTO: 10.3 FL (ref 6–12)
POTASSIUM BLD-SCNC: 3.6 MMOL/L (ref 3.5–5.2)
RBC # BLD AUTO: 4.21 10*6/MM3 (ref 4.6–6)
SODIUM BLD-SCNC: 138 MMOL/L (ref 136–145)
WBC NRBC COR # BLD: 6.38 10*3/MM3 (ref 4.5–10.7)

## 2018-04-21 PROCEDURE — 80048 BASIC METABOLIC PNL TOTAL CA: CPT | Performed by: HOSPITALIST

## 2018-04-21 PROCEDURE — 85025 COMPLETE CBC W/AUTO DIFF WBC: CPT | Performed by: HOSPITALIST

## 2018-04-21 PROCEDURE — 82962 GLUCOSE BLOOD TEST: CPT

## 2018-04-21 PROCEDURE — 25010000002 LEVOFLOXACIN PER 250 MG: Performed by: HOSPITALIST

## 2018-04-21 RX ORDER — LEVOFLOXACIN 750 MG/1
750 TABLET ORAL DAILY
Qty: 7 TABLET | Refills: 0 | Status: SHIPPED | OUTPATIENT
Start: 2018-04-21 | End: 2018-04-28

## 2018-04-21 RX ORDER — GLIPIZIDE 5 MG/1
5 TABLET ORAL 2 TIMES DAILY
Status: DISCONTINUED | OUTPATIENT
Start: 2018-04-21 | End: 2018-04-21 | Stop reason: HOSPADM

## 2018-04-21 RX ORDER — GLIPIZIDE 5 MG/1
2.5 TABLET ORAL 2 TIMES DAILY
Status: DISCONTINUED | OUTPATIENT
Start: 2018-04-21 | End: 2018-04-21

## 2018-04-21 RX ORDER — LEVOFLOXACIN 750 MG/1
750 TABLET ORAL DAILY
Status: DISCONTINUED | OUTPATIENT
Start: 2018-04-22 | End: 2018-04-21 | Stop reason: HOSPADM

## 2018-04-21 RX ORDER — PANTOPRAZOLE SODIUM 40 MG/1
40 TABLET, DELAYED RELEASE ORAL
Qty: 60 TABLET | Refills: 0 | Status: SHIPPED | OUTPATIENT
Start: 2018-04-21 | End: 2018-04-24 | Stop reason: SDUPTHER

## 2018-04-21 RX ORDER — METRONIDAZOLE 500 MG/1
500 TABLET ORAL EVERY 8 HOURS SCHEDULED
Qty: 21 TABLET | Refills: 0 | Status: SHIPPED | OUTPATIENT
Start: 2018-04-21 | End: 2018-04-28

## 2018-04-21 RX ORDER — METRONIDAZOLE 500 MG/1
500 TABLET ORAL EVERY 8 HOURS SCHEDULED
Status: DISCONTINUED | OUTPATIENT
Start: 2018-04-21 | End: 2018-04-21 | Stop reason: HOSPADM

## 2018-04-21 RX ADMIN — LEVOFLOXACIN 750 MG: 5 INJECTION, SOLUTION INTRAVENOUS at 06:58

## 2018-04-21 RX ADMIN — ATORVASTATIN CALCIUM 10 MG: 10 TABLET, FILM COATED ORAL at 08:51

## 2018-04-21 RX ADMIN — METRONIDAZOLE 500 MG: 500 INJECTION, SOLUTION INTRAVENOUS at 05:54

## 2018-04-21 RX ADMIN — GLIPIZIDE 5 MG: 5 TABLET ORAL at 08:50

## 2018-04-21 RX ADMIN — OXYCODONE HYDROCHLORIDE AND ACETAMINOPHEN 1000 MG: 500 TABLET ORAL at 08:51

## 2018-04-21 RX ADMIN — METRONIDAZOLE 500 MG: 500 INJECTION, SOLUTION INTRAVENOUS at 12:16

## 2018-04-21 RX ADMIN — VITAMIN D, TAB 1000IU (100/BT) 1000 UNITS: 25 TAB at 08:51

## 2018-04-21 RX ADMIN — LEVOTHYROXINE SODIUM 125 MCG: 125 TABLET ORAL at 06:45

## 2018-04-21 RX ADMIN — PANTOPRAZOLE SODIUM 40 MG: 40 TABLET, DELAYED RELEASE ORAL at 06:45

## 2018-04-21 NOTE — PLAN OF CARE
Problem: Patient Care Overview  Goal: Plan of Care Review  Outcome: Ongoing (interventions implemented as appropriate)  Pt has been up ad pradeep in room, tolerating regular diet but no BM as of yet. He denies discomfort but has been unable to sleep tonight. He requested that we check his BGL and which was 227. MD restarted his glucotrol, the first dose will be given this am. He is anticipating discharge today. Will continue to monitor and report any changes as needed.  Goal: Individualization and Mutuality  Outcome: Ongoing (interventions implemented as appropriate)    Goal: Discharge Needs Assessment  Outcome: Ongoing (interventions implemented as appropriate)      Problem: Pain, Acute (Adult)  Goal: Acceptable Pain Control/Comfort Level  Outcome: Ongoing (interventions implemented as appropriate)

## 2018-04-21 NOTE — PROGRESS NOTES
"Daily progress note    Chief complaint  Doing much better  No new complaints    History of present illness  52-year-old white male with history of coronary artery disease diabetes mellitus hypertension hypothyroidism hyperlipidemia obstructive sleep apnea vitamin D deficiency who is morbidly obese presented to [facility] emergency room with epigastric pain started yesterday followed by nausea vomiting diarrhea.  Patient also have notice black or blood in the stools for the last several days to weeks and has been followed by GI.  Patient denies any blood in the vomitus.  Patient denies any fever chills.  Patient evaluated in ER found to have enterocolitis admitted for management     REVIEW OF SYSTEMS  Review of Systems   Constitutional: Negative for activity change, appetite change and fever.   HENT: Negative for congestion and sore throat.    Eyes: Negative.    Respiratory: Negative for cough and shortness of breath.    Cardiovascular: Negative for chest pain and leg swelling.   Gastrointestinal: Positive for abdominal pain (upper), diarrhea, nausea and vomiting.   Endocrine: Negative.    Genitourinary: Negative for decreased urine volume and dysuria.   Musculoskeletal: Negative for neck pain.   Skin: Negative for rash and wound.   Allergic/Immunologic: Negative.    Neurological: Negative for weakness, numbness and headaches.   Hematological: Negative.    Psychiatric/Behavioral: Negative.    All other systems reviewed and are negative.     PHYSICAL EXAM  Blood pressure 113/80, pulse 67, temperature 97.1 °F (36.2 °C), temperature source Oral, resp. rate 16, height 185.4 cm (72.99\"), weight 132 kg (290 lb), SpO2 94 %.    Constitutional: He is oriented to person, place, and time and well-developed, well-nourished, and in no distress.   Head: Normocephalic and atraumatic.   Eyes: EOM are normal. Pupils are equal, round, and reactive to light.   Neck: Normal range of motion. Neck supple.   Cardiovascular: " Normal rate, regular rhythm and normal heart sounds.    Pulmonary/Chest: Effort normal and breath sounds normal. No respiratory distress.   Abdominal: Soft. There is tenderness in the epigastric area. There is no rebound and no guarding.   Musculoskeletal: Normal range of motion. He exhibits no edema.   Neurological: He is alert and oriented to person, place, and time. He has normal sensation and normal strength.   Skin: Skin is warm and dry.   Psychiatric: Mood and affect normal.     LAB RESULTS  Lab Results (last 24 hours)     Procedure Component Value Units Date/Time    POC Glucose Once [[id number]]  (Abnormal) Collected:  04/21/18 1023    Specimen:  Blood Updated:  04/21/18 1024     Glucose 148 (H) mg/dL     Narrative:       Meter: [id number] : [id number] Martin GREEN    Basic Metabolic Panel [[id number]]  (Abnormal) Collected:  04/21/18 0714    Specimen:  Blood Updated:  04/21/18 0800     Glucose 211 (H) mg/dL      BUN 9 mg/dL      Creatinine 0.77 mg/dL      Sodium 138 mmol/L      Potassium 3.6 mmol/L      Chloride 101 mmol/L      CO2 24.4 mmol/L      Calcium 8.3 (L) mg/dL      eGFR Non African Amer 106 mL/min/1.73      BUN/Creatinine Ratio 11.7     Anion Gap 12.6 mmol/L     Narrative:       GFR Normal >60  Chronic Kidney Disease <60  Kidney Failure <15    CBC & Differential [[id number]] Collected:  04/21/18 0714    Specimen:  Blood Updated:  04/21/18 0735    Narrative:       The following orders were created for panel order CBC & Differential.  Procedure                               Abnormality         Status                     ---------                               -----------         ------                     CBC Auto Differential[[id number]]        Abnormal            Final result                 Please view results for these tests on the individual orders.    CBC Auto Differential [[id number]]  (Abnormal) Collected:  04/21/18 0714    Specimen:  Blood Updated:  04/21/18 0735     WBC 6.38  10*3/mm3      RBC 4.21 (L) 10*6/mm3      Hemoglobin 13.1 (L) g/dL      Hematocrit 39.5 (L) %      MCV 93.8 fL      MCH 31.1 pg      MCHC 33.2 g/dL      RDW 13.3 %      RDW-SD 45.3 fl      MPV 10.3 fL      Platelets 193 10*3/mm3      Neutrophil % 66.3 %      Lymphocyte % 19.9 %      Monocyte % 9.1 %      Eosinophil % 4.1 %      Basophil % 0.3 %      Immature Grans % 0.3 %      Neutrophils, Absolute 4.23 10*3/mm3      Lymphocytes, Absolute 1.27 10*3/mm3      Monocytes, Absolute 0.58 10*3/mm3      Eosinophils, Absolute 0.26 10*3/mm3      Basophils, Absolute 0.02 10*3/mm3      Immature Grans, Absolute 0.02 10*3/mm3     POC Glucose Once [[id number]]  (Abnormal) Collected:  04/21/18 0616    Specimen:  Blood Updated:  04/21/18 0620     Glucose 205 (H) mg/dL     Narrative:       Meter: [id number] : [id number] Clemens Rin NA    POC Glucose Once [[id number]]  (Abnormal) Collected:  04/20/18 2350    Specimen:  Blood Updated:  04/21/18 0002     Glucose 227 (H) mg/dL     Narrative:       Meter: [id number] : [id number] Clemens Rin NA        Imaging Results (last 24 hours)     ** No results found for the last 24 hours. **      EKG                              Rhythm/Rate: NSR 85  P waves and PA: nml  QRS, axis: widened QRS, RBBB, RAD  ST and T waves: nml       Current Facility-Administered Medications:   •  atorvastatin (LIPITOR) tablet 10 mg, 10 mg, Oral, Daily, Ranjit Madden MD, 10 mg at 04/21/18 0851  •  cholecalciferol (VITAMIN D3) tablet 1,000 Units, 1,000 Units, Oral, Daily, Ranjit Madden MD, 1,000 Units at 04/21/18 0851  •  glipiZIDE (GLUCOTROL) tablet 5 mg, 5 mg, Oral, BID, Ranjit Madden MD, 5 mg at 04/21/18 0850  •  hydrocortisone (ANUSOL-HC) 2.5 % rectal cream, , Rectal, BID, Gretchen Phillips APRN  •  HYDROmorphone (DILAUDID) injection 0.5 mg, 0.5 mg, Intravenous, Q4H PRN, Ranjit Madden MD, 0.5 mg at 04/19/18 2004  •  levoFLOXacin (LEVAQUIN) 750 mg/150 mL D5W (premix) (LEVAQUIN) 750 mg, 750 mg, Intravenous, Q24H, Ranjit  MD Maryanne, 750 mg at 04/21/18 0658  •  levothyroxine (SYNTHROID, LEVOTHROID) tablet 125 mcg, 125 mcg, Oral, QAM, Toña Madden MD, 125 mcg at 04/21/18 0645  •  metroNIDAZOLE (FLAGYL) IVPB 500 mg, 500 mg, Intravenous, Q8H, Toña Madden MD, 500 mg at 04/21/18 0554  •  ondansetron (ZOFRAN) injection 4 mg, 4 mg, Intravenous, Q4H PRN, Toña Madden MD, 4 mg at 04/19/18 1158  •  pantoprazole (PROTONIX) EC tablet 40 mg, 40 mg, Oral, BID AC, Gretchen Phillips, APRN, 40 mg at 04/21/18 0645  •  promethazine (PHENERGAN) injection 12.5 mg, 12.5 mg, Intravenous, Q4H PRN, Toña Madden MD, 12.5 mg at 04/19/18 2012  •  Insert peripheral IV, , , Once **AND** sodium chloride 0.9 % flush 10 mL, 10 mL, Intravenous, PRN, Joe Phillips MD  •  vitamin C (ASCORBIC ACID) tablet 1,000 mg, 1,000 mg, Oral, Daily, Toña Madden MD, 1,000 mg at 04/21/18 0851     ASSESSMENT  Acute severe enterocolitis probably infectious  Bloody stools secondary to hemorrhoids  Diabetes mellitus  Hypertension  Hyperlipidemia  Hypothyroidism  Gastroesophageal reflux disease  Obstructive sleep apnea  Morbidly obese  History of lung nodule    PLAN  Discharge home on oral antibiotics  Discharge summary dictated    TOÑA MADDEN MD

## 2018-04-21 NOTE — DISCHARGE SUMMARY
Discharge summary    Date of admission 4/19/2018  Date of discharge 4/21/2018    Final diagnosis  Acute severe enterocolitis probably infectious  Bloody stools secondary to hemorrhoids  Diabetes mellitus  Hypertension  Hyperlipidemia  Hypothyroidism  Gastroesophageal reflux disease  Obstructive sleep apnea  Morbidly obese  History of lung nodule    Discharge medications    Current Facility-Administered Medications:   •  atorvastatin (LIPITOR) tablet 10 mg, 10 mg, Oral, Daily, Ranjit Madden MD, 10 mg at 04/21/18 0851  •  cholecalciferol (VITAMIN D3) tablet 1,000 Units, 1,000 Units, Oral, Daily, Ranjit Madden MD, 1,000 Units at 04/21/18 0851  •  glipiZIDE (GLUCOTROL) tablet 5 mg, 5 mg, Oral, BID, Ranjit Madden MD, 5 mg at 04/21/18 0850  •  hydrocortisone (ANUSOL-HC) 2.5 % rectal cream, , Rectal, BID, DEREK Rachel  •  [START ON 4/22/2018] levoFLOXacin (LEVAQUIN) tablet 750 mg, 750 mg, Oral, Daily, Ranjit Madden MD  •  levothyroxine (SYNTHROID, LEVOTHROID) tablet 125 mcg, 125 mcg, Oral, QAM, Ranjit Madden MD, 125 mcg at 04/21/18 0645  •  metroNIDAZOLE (FLAGYL) tablet 500 mg, 500 mg, Oral, Q8H, Ranjit Madden MD  •  pantoprazole (PROTONIX) EC tablet 40 mg, 40 mg, Oral, BID AC, DEREK Rachel, 40 mg at 04/21/18 0645  •  Insert peripheral IV, , , Once **AND** sodium chloride 0.9 % flush 10 mL, 10 mL, Intravenous, PRN, Joe Phillips MD  •  vitamin C (ASCORBIC ACID) tablet 1,000 mg, 1,000 mg, Oral, Daily, Ranjit Madden MD, 1,000 mg at 04/21/18 0851     Consult obtained  Gastroenterology    Procedures  None    Hospital course  52-year-old white male with multiple medical problems admitted through emergency room with epigastric pain nausea vomiting diarrhea also have bloody stools.  Patient evaluated found to have severe enterocolitis infectious admitted for management.  Patient treated with antibiotics he responded to the treatment his bloody stools was from and internal hemorrhoids started on Anusol.   Patient symptoms completely resolve white count Normal and He Is Tolerating Regular Diet.  Patient to Discharge Home on Oral Antibiotics for 7 More Days and He Needs Upper and Lower Endoscopy after 8 Weeks.  Patient Home Medications Resumed and he Is tolerating Regular Diet.    Discharge diet regular    Activity as tolerated    Medication as above    Follow-up with primary doctor in 1 week and follow with gastroenterology per their instruction and take medication as directed    TOÑA MEDLEY MD

## 2018-04-23 NOTE — PROGRESS NOTES
Case Management Discharge Note    Final Note: Home via private auto    Destination     No service coordination in this encounter.      Durable Medical Equipment     No service coordination in this encounter.      Dialysis/Infusion     No service coordination in this encounter.      Home Medical Care     No service coordination in this encounter.      Social Care     No service coordination in this encounter.        Other: Other (auto)    Final Discharge Disposition Code: 01 - home or self-care

## 2018-04-24 ENCOUNTER — OFFICE VISIT (OUTPATIENT)
Dept: GASTROENTEROLOGY | Facility: CLINIC | Age: 53
End: 2018-04-24

## 2018-04-24 VITALS
WEIGHT: 286.4 LBS | TEMPERATURE: 98.2 F | SYSTOLIC BLOOD PRESSURE: 112 MMHG | DIASTOLIC BLOOD PRESSURE: 82 MMHG | HEIGHT: 73 IN | BODY MASS INDEX: 37.96 KG/M2

## 2018-04-24 DIAGNOSIS — K52.9 COLITIS: ICD-10-CM

## 2018-04-24 DIAGNOSIS — K62.5 RECTAL BLEEDING: Primary | ICD-10-CM

## 2018-04-24 DIAGNOSIS — K63.5 HYPERPLASTIC COLONIC POLYP, UNSPECIFIED PART OF COLON: ICD-10-CM

## 2018-04-24 DIAGNOSIS — K21.9 GASTROESOPHAGEAL REFLUX DISEASE, ESOPHAGITIS PRESENCE NOT SPECIFIED: ICD-10-CM

## 2018-04-24 DIAGNOSIS — D50.9 IRON DEFICIENCY ANEMIA, UNSPECIFIED IRON DEFICIENCY ANEMIA TYPE: ICD-10-CM

## 2018-04-24 LAB
ALBUMIN SERPL-MCNC: 4 G/DL (ref 3.5–5.2)
ALBUMIN/GLOB SERPL: 1.5 G/DL
ALP SERPL-CCNC: 91 U/L (ref 39–117)
ALT SERPL-CCNC: 63 U/L (ref 1–41)
AST SERPL-CCNC: 34 U/L (ref 1–40)
BASOPHILS # BLD AUTO: 0.02 10*3/MM3 (ref 0–0.2)
BASOPHILS NFR BLD AUTO: 0.3 % (ref 0–1.5)
BILIRUB SERPL-MCNC: 0.7 MG/DL (ref 0.1–1.2)
BUN SERPL-MCNC: 16 MG/DL (ref 6–20)
BUN/CREAT SERPL: 16.8 (ref 7–25)
CALCIUM SERPL-MCNC: 9.6 MG/DL (ref 8.6–10.5)
CHLORIDE SERPL-SCNC: 100 MMOL/L (ref 98–107)
CO2 SERPL-SCNC: 23.2 MMOL/L (ref 22–29)
CREAT SERPL-MCNC: 0.95 MG/DL (ref 0.76–1.27)
EOSINOPHIL # BLD AUTO: 0.28 10*3/MM3 (ref 0–0.7)
EOSINOPHIL NFR BLD AUTO: 4 % (ref 0.3–6.2)
ERYTHROCYTE [DISTWIDTH] IN BLOOD BY AUTOMATED COUNT: 13.8 % (ref 11.5–14.5)
GFR SERPLBLD CREATININE-BSD FMLA CKD-EPI: 101 ML/MIN/1.73
GFR SERPLBLD CREATININE-BSD FMLA CKD-EPI: 83 ML/MIN/1.73
GLOBULIN SER CALC-MCNC: 2.6 GM/DL
GLUCOSE SERPL-MCNC: 233 MG/DL (ref 65–99)
HCT VFR BLD AUTO: 44.6 % (ref 40.4–52.2)
HGB BLD-MCNC: 15.1 G/DL (ref 13.7–17.6)
IMM GRANULOCYTES # BLD: 0.05 10*3/MM3 (ref 0–0.03)
IMM GRANULOCYTES NFR BLD: 0.7 % (ref 0–0.5)
LYMPHOCYTES # BLD AUTO: 1.13 10*3/MM3 (ref 0.9–4.8)
LYMPHOCYTES NFR BLD AUTO: 16.3 % (ref 19.6–45.3)
MCH RBC QN AUTO: 31.5 PG (ref 27–32.7)
MCHC RBC AUTO-ENTMCNC: 33.9 G/DL (ref 32.6–36.4)
MCV RBC AUTO: 93.1 FL (ref 79.8–96.2)
MONOCYTES # BLD AUTO: 0.58 10*3/MM3 (ref 0.2–1.2)
MONOCYTES NFR BLD AUTO: 8.4 % (ref 5–12)
NEUTROPHILS # BLD AUTO: 4.93 10*3/MM3 (ref 1.9–8.1)
NEUTROPHILS NFR BLD AUTO: 71 % (ref 42.7–76)
PLATELET # BLD AUTO: 252 10*3/MM3 (ref 140–500)
POTASSIUM SERPL-SCNC: 4.3 MMOL/L (ref 3.5–5.2)
PROT SERPL-MCNC: 6.6 G/DL (ref 6–8.5)
RBC # BLD AUTO: 4.79 10*6/MM3 (ref 4.6–6)
SODIUM SERPL-SCNC: 138 MMOL/L (ref 136–145)
WBC # BLD AUTO: 6.94 10*3/MM3 (ref 4.5–10.7)

## 2018-04-24 PROCEDURE — 99214 OFFICE O/P EST MOD 30 MIN: CPT | Performed by: NURSE PRACTITIONER

## 2018-04-24 RX ORDER — PANTOPRAZOLE SODIUM 40 MG/1
40 TABLET, DELAYED RELEASE ORAL
Qty: 60 TABLET | Refills: 5 | Status: SHIPPED | OUTPATIENT
Start: 2018-04-24 | End: 2018-12-21 | Stop reason: SDUPTHER

## 2018-04-24 NOTE — PROGRESS NOTES
Chief Complaint   Patient presents with   • Abdominal Pain   • Rectal Bleeding       Chetan Grimm is a  52 y.o. male here for a hospital follow up visit for rectal bleeding, colitis and GERD.     HPI  53 yo m presents today accompanied by his wife for hospital follow up for rectal bleeding, N/V/D secondary to colitis. He is a patient of Dr. Frances. He was seen by our service on 4/18/18 for Enterocolitis by imaging, N/V/D, Leukocytosis, GERD, chronic rectal bleeding with hx hemorrhoids and hx anemia (vitamin D and Iron deficiency).     CT scan abd/pelvis done on 4/19/18 showed:  IMPRESSION:  1.  Mild to moderately severe enterocolitis is suspected, no  obstruction, perforation or abscess.   2.  1.3 cm nodule at the left lung base is unchanged. Six-month  follow-up is recommended.    WBC elevated at 15.7. Hgb slightly low at 13. 3 from 15.7 (could be falsely lower due to hydration status?). He was treated with Protonix BID, Levaquin and Flagyl. WBC started to trend down and Hgb has been stable. Rectal bleeding has stopped since hospitalization. Patient admits since being home he has maintained a bland diet and is still having some loose stools but nothing like it was. He is still taking the ABX. He denies any more rectal bleeding. He would like referral to specialist for his abd hernias and hemorrhoids. He has had frequent rectal bleeding about every 5-6 days for months. Has hx internal and external hemorrhoids. Last Colonoscopy was done 3/2017 that showed Large internal hemorrhoids and a hyperplastic polyp. He has been using anusol cream PRN. He denies previous hx colitis in the past. He admits he is still having reflux symptoms on the BID protonix. Its better but not 100%. He denies any dysphagia, N&V, constipation, rectal bleeding or melena. He admits his appetite is improving. He does take routine Vit D and iron supplements.     Past Medical History:   Diagnosis Date   • Coronary artery disease    •  Cryptococcal pneumonitis 2010    TREATED WITH AMPHOTERACIN B   • Diabetes mellitus    • Disease of thyroid gland     HYPOTHYROID   • Hyperlipidemia    • Hypothyroidism    • ANNEL on CPAP    • Pulmonary nodule    • Testosterone deficiency    • Type 2 diabetes mellitus    • Vitamin D deficiency        Past Surgical History:   Procedure Laterality Date   • ANTERIOR CERVICAL CORPECTOMY W/ FUSION  2006    C2-3   • ANTERIOR CERVICAL DISCECTOMY W/ FUSION  2012    C2-T2 PREVIOUS HARDWARE REMOVED AND REVISED   • ANTERIOR CERVICAL FUSION  2008    C3-5   • CARDIAC CATHETERIZATION N/A 6/16/2016    Procedure: Coronary angiography;  Surgeon: Emiliano Gordon MD;  Location:  [facility];  Service:    • CARDIAC CATHETERIZATION N/A 6/16/2016    Procedure: Left Heart Cath;  Surgeon: Emiliano Gordon MD;  Location:  [facility];  Service:    • CARDIAC CATHETERIZATION N/A 6/16/2016    Procedure: Left ventriculography;  Surgeon: Emiliano Gordon MD;  Location:  [facility];  Service:    • COLONOSCOPY  2017    Dr. Victoria @[facility], bleeding hemorrhoids per pt   • CORONARY ANGIOPLASTY  01/2015   • CORONARY STENT PLACEMENT     • HERNIA REPAIR     • KNEE ARTHROSCOPY         Scheduled Meds:     Continuous Infusions:     PRN Meds:.•  nitroglycerin    Allergies   Allergen Reactions   • Adhesive Tape        Social History     Social History   • Marital status:      Spouse name: N/A   • Number of children: N/A   • Years of education: N/A     Occupational History   • Not on file.     Social History Main Topics   • Smoking status: Never Smoker   • Smokeless tobacco: Never Used   • Alcohol use No   • Drug use: No   • Sexual activity: Defer     Other Topics Concern   • Not on file     Social History Narrative   • No narrative on file       Family History   Problem Relation Age of Onset   • Heart disease Mother    • Hypertension Mother    • Hypertension Father    • Heart disease  Father        Review of Systems   Constitutional: Positive for fatigue. Negative for appetite change, chills, diaphoresis, fever and unexpected weight change.   HENT: Negative for nosebleeds, postnasal drip, sore throat, trouble swallowing and voice change.    Respiratory: Negative for cough, choking, chest tightness, shortness of breath and wheezing.    Cardiovascular: Negative for chest pain.   Gastrointestinal: Positive for abdominal distention and diarrhea. Negative for abdominal pain, anal bleeding, blood in stool, constipation, nausea, rectal pain and vomiting.   Endocrine: Negative for polydipsia, polyphagia and polyuria.   Musculoskeletal: Negative for gait problem.   Skin: Negative for rash and wound.   Allergic/Immunologic: Negative for food allergies.   Neurological: Negative for dizziness, speech difficulty and light-headedness.   Psychiatric/Behavioral: Negative for confusion, self-injury, sleep disturbance and suicidal ideas.       Vitals:    04/24/18 0849   BP: 112/82   Temp: 98.2 °F (36.8 °C)       Physical Exam   Constitutional: He is oriented to person, place, and time. He appears well-developed and well-nourished. He does not appear ill. No distress.   HENT:   Head: Normocephalic.   Eyes: Pupils are equal, round, and reactive to light.   Cardiovascular: Normal rate, regular rhythm and normal heart sounds.    Pulmonary/Chest: Effort normal and breath sounds normal.   Abdominal: Soft. Bowel sounds are normal. He exhibits no distension and no mass. There is no hepatosplenomegaly. There is no tenderness. There is no rebound and no guarding. No hernia.   Musculoskeletal: Normal range of motion.   Neurological: He is alert and oriented to person, place, and time.   Skin: Skin is warm and dry.   Psychiatric: He has a normal mood and affect. His speech is normal and behavior is normal. Judgment normal.       No images are attached to the encounter.    Assessment & Plan     1. Rectal bleeding  - CBC &  Differential  - Comprehensive Metabolic Panel  - Case Request; Standing  - Case Request    2. Colitis  - CBC & Differential  - Comprehensive Metabolic Panel  - Case Request; Standing  - Case Request    3. Gastroesophageal reflux disease, esophagitis presence not specified  - pantoprazole (PROTONIX) 40 MG EC tablet; Take 1 tablet by mouth 2 (Two) Times a Day Before Meals for 30 days.  Dispense: 60 tablet; Refill: 5  - Case Request; Standing  - Case Request    4. Iron deficiency anemia, unspecified iron deficiency anemia type    5. Hyperplastic colonic polyp, unspecified part of colon  - Case Request; Standing  - Case Request    GERD is improved on Protonix BID but not 100%. Rectal bleeding has stopped for now. N&V and diarrhea has resolved. I will check labs today. Recommend EGD and Colonoscopy in 8 weeks with Dr. Frances. I discussed the potential risks involved with both procedures and patient is agreeable to proceed. Continue Protonix BID for now. I will refill it. Will refer to Dr. Reyna for hemorrhoids. Will refer to Dr. Ca/surgeon for abd hernia repairs. Patient to continue a low residue/bland diet. Continue the Anusol Cream or suppositories as needed. Call office with any issues. Will call with lab results.

## 2018-04-25 ENCOUNTER — TRANSCRIBE ORDERS (OUTPATIENT)
Dept: ADMINISTRATIVE | Facility: HOSPITAL | Age: 53
End: 2018-04-25

## 2018-04-25 ENCOUNTER — LAB (OUTPATIENT)
Dept: LAB | Facility: HOSPITAL | Age: 53
End: 2018-04-25

## 2018-04-25 DIAGNOSIS — R53.83 FATIGUE, UNSPECIFIED TYPE: ICD-10-CM

## 2018-04-25 DIAGNOSIS — B45.0 CRYPTOCOCCAL PNEUMONITIS (HCC): Primary | ICD-10-CM

## 2018-04-25 DIAGNOSIS — B45.0 CRYPTOCOCCAL PNEUMONITIS (HCC): ICD-10-CM

## 2018-04-25 DIAGNOSIS — R61 NIGHT SWEATS: ICD-10-CM

## 2018-04-25 DIAGNOSIS — J15.9 PNEUMONIA, BACTERIAL: Primary | ICD-10-CM

## 2018-04-25 LAB — ALBUMIN UR-MCNC: 1.3 MG/L

## 2018-04-25 PROCEDURE — 82043 UR ALBUMIN QUANTITATIVE: CPT | Performed by: NURSE PRACTITIONER

## 2018-04-25 PROCEDURE — 36415 COLL VENOUS BLD VENIPUNCTURE: CPT

## 2018-04-25 PROCEDURE — 87899 AGENT NOS ASSAY W/OPTIC: CPT

## 2018-04-26 LAB — CRYOGLOB SER QL: NEGATIVE

## 2018-04-27 ENCOUNTER — TELEPHONE (OUTPATIENT)
Dept: GASTROENTEROLOGY | Facility: CLINIC | Age: 53
End: 2018-04-27

## 2018-04-27 ENCOUNTER — OFFICE VISIT (OUTPATIENT)
Dept: ENDOCRINOLOGY | Age: 53
End: 2018-04-27

## 2018-04-27 ENCOUNTER — HOSPITAL ENCOUNTER (OUTPATIENT)
Dept: CT IMAGING | Facility: HOSPITAL | Age: 53
Discharge: HOME | End: 2018-04-27
Admitting: NURSE PRACTITIONER

## 2018-04-27 VITALS — DIASTOLIC BLOOD PRESSURE: 74 MMHG | BODY MASS INDEX: 38.08 KG/M2 | WEIGHT: 288.6 LBS | SYSTOLIC BLOOD PRESSURE: 102 MMHG

## 2018-04-27 DIAGNOSIS — I10 BENIGN ESSENTIAL HTN: ICD-10-CM

## 2018-04-27 DIAGNOSIS — E11.69 TYPE 2 DIABETES MELLITUS WITH OTHER SPECIFIED COMPLICATION, WITH LONG-TERM CURRENT USE OF INSULIN (HCC): Primary | ICD-10-CM

## 2018-04-27 DIAGNOSIS — E78.5 HYPERLIPIDEMIA, UNSPECIFIED HYPERLIPIDEMIA TYPE: ICD-10-CM

## 2018-04-27 DIAGNOSIS — J15.9 PNEUMONIA, BACTERIAL: ICD-10-CM

## 2018-04-27 DIAGNOSIS — E29.1 HYPOGONADISM IN MALE: ICD-10-CM

## 2018-04-27 DIAGNOSIS — E55.9 VITAMIN D DEFICIENCY: ICD-10-CM

## 2018-04-27 DIAGNOSIS — Z79.4 TYPE 2 DIABETES MELLITUS WITH OTHER SPECIFIED COMPLICATION, WITH LONG-TERM CURRENT USE OF INSULIN (HCC): Primary | ICD-10-CM

## 2018-04-27 DIAGNOSIS — R79.89 LOW TESTOSTERONE: ICD-10-CM

## 2018-04-27 LAB
25(OH)D3+25(OH)D2 SERPL-MCNC: 44.7 NG/ML (ref 30–100)
ALBUMIN SERPL-MCNC: 4.3 G/DL (ref 3.5–5.2)
ALBUMIN/GLOB SERPL: 1.7 G/DL
ALP SERPL-CCNC: 95 U/L (ref 39–117)
ALT SERPL-CCNC: 21 U/L (ref 1–41)
AST SERPL-CCNC: 14 U/L (ref 1–40)
BILIRUB SERPL-MCNC: 0.5 MG/DL (ref 0.1–1.2)
BUN SERPL-MCNC: 11 MG/DL (ref 6–20)
BUN/CREAT SERPL: 17.2 (ref 7–25)
C PEPTIDE SERPL-MCNC: 9.2 NG/ML (ref 1.1–4.4)
CALCIUM SERPL-MCNC: 9.8 MG/DL (ref 8.6–10.5)
CHLORIDE SERPL-SCNC: 99 MMOL/L (ref 98–107)
CHOLEST SERPL-MCNC: 180 MG/DL (ref 0–200)
CO2 SERPL-SCNC: 27 MMOL/L (ref 22–29)
CREAT BLDA-MCNC: 0.9 MG/DL (ref 0.6–1.3)
CREAT SERPL-MCNC: 0.64 MG/DL (ref 0.76–1.27)
FT4I SERPL CALC-MCNC: 2 (ref 1.2–4.9)
GFR SERPLBLD CREATININE-BSD FMLA CKD-EPI: 131 ML/MIN/1.73
GFR SERPLBLD CREATININE-BSD FMLA CKD-EPI: >150 ML/MIN/1.73
GLOBULIN SER CALC-MCNC: 2.6 GM/DL
GLUCOSE SERPL-MCNC: 159 MG/DL (ref 65–99)
HBA1C MFR BLD: 7.5 % (ref 4.8–5.6)
HCT VFR BLD AUTO: 46.2 % (ref 40.4–52.2)
HDLC SERPL-MCNC: 27 MG/DL (ref 40–60)
HGB BLD-MCNC: 15.7 G/DL (ref 13.7–17.6)
INTERPRETATION: NORMAL
LDLC SERPL CALC-MCNC: ABNORMAL MG/DL
Lab: NORMAL
MICROALBUMIN UR-MCNC: <3 UG/ML
POTASSIUM SERPL-SCNC: 4.3 MMOL/L (ref 3.5–5.2)
PROT SERPL-MCNC: 6.9 G/DL (ref 6–8.5)
PSA SERPL-MCNC: 0.66 NG/ML (ref 0–4)
SHBG SERPL-SCNC: 38.9 NMOL/L (ref 19.3–76.4)
SODIUM SERPL-SCNC: 141 MMOL/L (ref 136–145)
T3FREE SERPL-MCNC: 2.6 PG/ML (ref 2–4.4)
T3RU NFR SERPL: 30 % (ref 24–39)
T4 FREE SERPL-MCNC: 1.28 NG/DL (ref 0.93–1.7)
T4 SERPL-MCNC: 6.7 UG/DL (ref 4.5–12)
TESTOST FREE SERPL-MCNC: 5.7 PG/ML (ref 7.2–24)
TESTOST SERPL-MCNC: 298 NG/DL (ref 264–916)
THYROGLOB AB SERPL-ACNC: 12 IU/ML
THYROGLOB SERPL-MCNC: 2.3 NG/ML
THYROGLOB SERPL-MCNC: ABNORMAL NG/ML
TRIGL SERPL-MCNC: 535 MG/DL (ref 0–150)
TSH SERPL DL<=0.005 MIU/L-ACNC: 4.4 UIU/ML (ref 0.45–4.5)
VLDLC SERPL CALC-MCNC: ABNORMAL MG/DL

## 2018-04-27 PROCEDURE — 0 IOPAMIDOL PER 1 ML: Performed by: NURSE PRACTITIONER

## 2018-04-27 PROCEDURE — 99214 OFFICE O/P EST MOD 30 MIN: CPT | Performed by: NURSE PRACTITIONER

## 2018-04-27 PROCEDURE — 71275 CT ANGIOGRAPHY CHEST: CPT

## 2018-04-27 PROCEDURE — 82565 ASSAY OF CREATININE: CPT

## 2018-04-27 RX ORDER — INSULIN DEGLUDEC 200 U/ML
50 INJECTION, SOLUTION SUBCUTANEOUS DAILY
Qty: 3 PEN | Refills: 5 | Status: SHIPPED | OUTPATIENT
Start: 2018-04-27 | End: 2018-08-10

## 2018-04-27 RX ORDER — IBUPROFEN 800 MG/1
800 TABLET ORAL EVERY 6 HOURS PRN
COMMUNITY
End: 2018-08-10

## 2018-04-27 RX ORDER — THIAMINE HCL 100 MG
2500 TABLET ORAL 2 TIMES DAILY
COMMUNITY
End: 2018-08-10

## 2018-04-27 RX ADMIN — IOPAMIDOL 95 ML: 755 INJECTION, SOLUTION INTRAVENOUS at 08:57

## 2018-04-27 NOTE — TELEPHONE ENCOUNTER
----- Message from DEREK Jacob sent at 4/25/2018  8:09 AM EDT -----  Please call patient and let him know his labs looked good. Hgb stable at 13.1. WBC is back to normal. Thanks.

## 2018-04-27 NOTE — PROGRESS NOTES
Subjective   Chetan Grimm is a 52 y.o. male is here today for follow-up.  Chief Complaint   Patient presents with   • Diabetes     RECENT LABS, pt test BG 2x daily, pt did not bring meter   • Hypothyroidism     pt has never received Axiron, pt states he has called a couple times and left messages for imtiaz but never heard anything.   • Hypogonadism   • Hyperlipidemia   • Vitamin D Deficiency     /74   Wt 131 kg (288 lb 9.6 oz)   BMI 38.08 kg/m²   Current Outpatient Prescriptions on File Prior to Visit   Medication Sig   • ascorbic acid (VITAMIN C) 1000 MG tablet Take 1,000 mg by mouth Daily.   • aspirin 81 MG chewable tablet Chew 81 mg daily.   • atorvastatin (LIPITOR) 20 MG tablet Take 1 tablet by mouth Daily. (Patient taking differently: Take 40 mg by mouth Every Night.)   • Cholecalciferol (VITAMIN D3) 5000 units capsule capsule Take 5,000 Units by mouth Daily.   • cyclobenzaprine (FLEXERIL) 10 MG tablet Take 10 mg by mouth 2 (two) times a day as needed.   • Dapagliflozin-Metformin HCl ER (XIGDUO XR) 5-1000 MG tablet sustained-release 24 hour Take 2 tablets by mouth Daily. (Patient taking differently: Take 1 tablet by mouth Daily.)   • ergocalciferol (DRISDOL) 30398 units capsule Take 1 po 3 times q week   • glipiZIDE (GLUCOTROL) 5 MG tablet Take 1 tablet by mouth 2 (Two) Times a Day Before Meals.   • glucagon (GLUCAGON EMERGENCY) 1 MG injection Inject 1 mg under the skin 1 (One) Time As Needed for low blood sugar for up to 1 dose.   • glucose blood (FREESTYLE LITE) test strip Use to test 4 times daily   • IRON PO Take  by mouth.   • Lancets (FREESTYLE) lancets Use to test BG 4 times daily   • levoFLOXacin (LEVAQUIN) 750 MG tablet Take 1 tablet by mouth Daily for 7 doses.   • levothyroxine (SYNTHROID) 125 MCG tablet Take 1 tablet by mouth Daily.   • lisinopril (PRINIVIL,ZESTRIL) 5 MG tablet Take 5 mg by mouth every night.   • metroNIDAZOLE (FLAGYL) 500 MG tablet Take 1 tablet by mouth Every 8 (Eight)  Hours for 21 doses.   • Omega-3 Fatty Acids (FISH OIL) 1000 MG capsule capsule Take  by mouth Daily With Breakfast.   • pantoprazole (PROTONIX) 40 MG EC tablet Take 1 tablet by mouth 2 (Two) Times a Day Before Meals for 30 days.   • RELION PEN NEEDLES 29G X 12MM misc Use to inject insulin 2 times   • TANZEUM 50 MG pen-injector Inject 1 dose under the skin 1 (One) Time Per Week. (Patient taking differently: Inject 1 dose under the skin 1 (One) Time Per Week. Pt takes on Tuesday nights)   • TRESIBA FLEXTOUCH 200 UNIT/ML solution pen-injector Inject 46 Units under the skin Daily. (Patient taking differently: Inject 46 Units under the skin Every Night.)   • [DISCONTINUED] AXIRON 30 MG/ACT solution 2 pumps under each arm daily   • [DISCONTINUED] clotrimazole-betamethasone (LOTRISONE) 1-0.05 % cream    • [DISCONTINUED] HYDROcodone-acetaminophen (NORCO) 5-325 MG per tablet Take 1 tablet by mouth As Needed.   • [DISCONTINUED] hydrocortisone (ANUSOL-HC) 2.5 % rectal cream Insert  into the rectum 2 (Two) Times a Day for 30 days.   • [DISCONTINUED] mupirocin (BACTROBAN) 2 % ointment Apply  topically As Needed.     Current Facility-Administered Medications on File Prior to Visit   Medication   • nitroglycerin (NITROSTAT) SL tablet 0.4 mg     Family History   Problem Relation Age of Onset   • Heart disease Mother    • Hypertension Mother    • Hypertension Father    • Heart disease Father      Social History   Substance Use Topics   • Smoking status: Never Smoker   • Smokeless tobacco: Never Used   • Alcohol use No     Allergies   Allergen Reactions   • Adhesive Tape          History of Present Illness   Encounter Diagnoses   Name Primary?   • Benign essential HTN    • Hyperlipidemia, unspecified hyperlipidemia type    • Vitamin D deficiency    • Hypogonadism in male    • Low testosterone    • Type 2 diabetes mellitus with other specified complication, with long-term current use of insulin Yes     This is a 52-year-old male  patient here today for routine follow-up visit.  He is being seen for the above-mentioned problems.  He's been in the hospital today having a CT of his chest.  He was found to have a lesion in his line so he had a CT done today.  He is concerned that this is possibly cancer.  He is complaining of burning in his feet and was recently seen by Dr. Thai Sampson who prescribed him a compound cream for pain.  He states he's only been on it for 4-5 days and does not notice any improvement yet.  I did tell him that we can prescribe gabapentin if we need to.  He had recent labs done which were reviewed.  He's had labs done in the hospital.  His last cholesterol was an satisfactory range however his cholesterol checked 3 days prior to that should his triglycerides greater than 500.  He is taking his medications as prescribed.  He's been dealing with a lot of issues regarding colitis    The following portions of the patient's history were reviewed and updated as appropriate: allergies, current medications, past family history, past medical history, past social history, past surgical history and problem list.    Review of Systems   Constitutional: Negative for fatigue.   HENT: Negative for trouble swallowing.    Eyes: Negative for visual disturbance.   Respiratory: Negative for shortness of breath.    Cardiovascular: Negative for leg swelling.   Endocrine: Negative for polyuria.   Skin: Negative for wound.   Neurological: Negative for numbness.       Objective   Physical Exam   Constitutional: He is oriented to person, place, and time. He appears well-developed and well-nourished. No distress.   HENT:   Head: Normocephalic and atraumatic.   Eyes: Pupils are equal, round, and reactive to light.   Neck: Normal range of motion. Neck supple. Carotid bruit is not present. No tracheal deviation, no edema and no erythema present. No thyromegaly present.   Cardiovascular: Normal rate, regular rhythm, normal heart sounds and intact distal  pulses.  Exam reveals no gallop and no friction rub.    No murmur heard.  Pulmonary/Chest: Effort normal and breath sounds normal. No respiratory distress. He has no wheezes. He has no rales.   Musculoskeletal: Normal range of motion. He exhibits no edema or deformity.   Lymphadenopathy:     He has no cervical adenopathy.   Neurological: He is alert and oriented to person, place, and time. Coordination normal.   Skin: Skin is warm and dry. No rash noted. He is not diaphoretic. No erythema. No pallor.   Psychiatric: He has a normal mood and affect. His behavior is normal. Judgment and thought content normal.   Nursing note and vitals reviewed.      Lab Results   Component Value Date    CHOL 131 04/20/2018    CHLPL 180 04/17/2018    TRIG 136 04/20/2018    HDL 26 (L) 04/20/2018    LDL 78 04/20/2018     Lab Results   Component Value Date    HGBA1C 7.90 (H) 04/20/2018      Lab Results   Component Value Date    GLUCOSE 211 (H) 04/21/2018    BUN 16 04/24/2018    CREATININE 0.90 04/27/2018    EGFRIFNONA 83 04/24/2018    EGFRIFAFRI 101 04/24/2018    BCR 16.8 04/24/2018    K 4.3 04/24/2018    CO2 23.2 04/24/2018    CALCIUM 9.6 04/24/2018    PROTENTOTREF 6.6 04/24/2018    ALBUMIN 4.00 04/24/2018    LABIL2 1.5 04/24/2018    AST 34 04/24/2018    ALT 63 (H) 04/24/2018     Lab Results   Component Value Date    TSH 4.020 04/20/2018    P9SNULH 6.7 04/17/2018         Assessment/Plan   Problems Addressed this Visit        Cardiovascular and Mediastinum    Hyperlipidemia    Relevant Orders    Ambulatory Referral to Internal Medicine    Benign essential HTN    Relevant Orders    Ambulatory Referral to Internal Medicine       Digestive    Vitamin D deficiency    Relevant Orders    Ambulatory Referral to Internal Medicine       Endocrine    Diabetes mellitus - Primary    Relevant Medications    TRESIBA FLEXTOUCH 200 UNIT/ML solution pen-injector    Other Relevant Orders    Ambulatory Referral to Internal Medicine    Low testosterone     Relevant Orders    Ambulatory Referral to Internal Medicine    Hypogonadism in male    Relevant Orders    Ambulatory Referral to Internal Medicine      Other Visit Diagnoses    None.       In summary, patient was seen and examined.  His medications were reviewed.  He has been denied getting his testosterone therapy due to a prior authorization which was completed back in November.  He states he was never notified of this and been approved.  He was given a written prescription for his testosterone therapy.  A Jere was reviewed.  He did not bring his blood glucose meter to today's visit.  His medications were reviewed and I have advised him to increase his insulin to 50 units once daily.  He is requesting referral to a primary care provider for referral has been made.  He is to follow-up his next visit in 4 months.  He will have labs done prior to his next visit.  I've advised him to contact the office if his blood sugars failed to improve    Increase tresiba to 50 units  Bring meter each visit  Continue all other meds

## 2018-05-08 ENCOUNTER — ANESTHESIA (OUTPATIENT)
Dept: GASTROENTEROLOGY | Facility: HOSPITAL | Age: 53
End: 2018-05-08

## 2018-05-08 ENCOUNTER — HOSPITAL ENCOUNTER (OUTPATIENT)
Facility: HOSPITAL | Age: 53
Setting detail: HOSPITAL OUTPATIENT SURGERY
Discharge: HOME | End: 2018-05-08
Attending: INTERNAL MEDICINE | Admitting: INTERNAL MEDICINE

## 2018-05-08 ENCOUNTER — ANESTHESIA EVENT (OUTPATIENT)
Dept: GASTROENTEROLOGY | Facility: HOSPITAL | Age: 53
End: 2018-05-08

## 2018-05-08 VITALS
OXYGEN SATURATION: 97 % | HEART RATE: 70 BPM | RESPIRATION RATE: 18 BRPM | DIASTOLIC BLOOD PRESSURE: 66 MMHG | WEIGHT: 282.13 LBS | SYSTOLIC BLOOD PRESSURE: 117 MMHG | BODY MASS INDEX: 36.21 KG/M2 | TEMPERATURE: 97.6 F | HEIGHT: 74 IN

## 2018-05-08 DIAGNOSIS — K52.9 COLITIS: ICD-10-CM

## 2018-05-08 DIAGNOSIS — K63.5 HYPERPLASTIC COLONIC POLYP, UNSPECIFIED PART OF COLON: ICD-10-CM

## 2018-05-08 DIAGNOSIS — K62.5 RECTAL BLEEDING: ICD-10-CM

## 2018-05-08 DIAGNOSIS — K21.9 GASTROESOPHAGEAL REFLUX DISEASE, ESOPHAGITIS PRESENCE NOT SPECIFIED: ICD-10-CM

## 2018-05-08 LAB — GLUCOSE BLDC GLUCOMTR-MCNC: 126 MG/DL (ref 70–130)

## 2018-05-08 PROCEDURE — 25010000002 PROPOFOL 10 MG/ML EMULSION: Performed by: ANESTHESIOLOGY

## 2018-05-08 PROCEDURE — 43239 EGD BIOPSY SINGLE/MULTIPLE: CPT | Performed by: INTERNAL MEDICINE

## 2018-05-08 PROCEDURE — 88305 TISSUE EXAM BY PATHOLOGIST: CPT | Performed by: INTERNAL MEDICINE

## 2018-05-08 PROCEDURE — 45380 COLONOSCOPY AND BIOPSY: CPT | Performed by: INTERNAL MEDICINE

## 2018-05-08 PROCEDURE — 82962 GLUCOSE BLOOD TEST: CPT

## 2018-05-08 PROCEDURE — 88312 SPECIAL STAINS GROUP 1: CPT | Performed by: INTERNAL MEDICINE

## 2018-05-08 RX ORDER — PROPOFOL 10 MG/ML
VIAL (ML) INTRAVENOUS AS NEEDED
Status: DISCONTINUED | OUTPATIENT
Start: 2018-05-08 | End: 2018-05-08 | Stop reason: SURG

## 2018-05-08 RX ORDER — PROPOFOL 10 MG/ML
VIAL (ML) INTRAVENOUS CONTINUOUS PRN
Status: DISCONTINUED | OUTPATIENT
Start: 2018-05-08 | End: 2018-05-08

## 2018-05-08 RX ORDER — SODIUM CHLORIDE, SODIUM LACTATE, POTASSIUM CHLORIDE, CALCIUM CHLORIDE 600; 310; 30; 20 MG/100ML; MG/100ML; MG/100ML; MG/100ML
1000 INJECTION, SOLUTION INTRAVENOUS CONTINUOUS
Status: DISCONTINUED | OUTPATIENT
Start: 2018-05-08 | End: 2018-05-08 | Stop reason: HOSPADM

## 2018-05-08 RX ORDER — PROPOFOL 10 MG/ML
VIAL (ML) INTRAVENOUS CONTINUOUS PRN
Status: DISCONTINUED | OUTPATIENT
Start: 2018-05-08 | End: 2018-05-08 | Stop reason: SURG

## 2018-05-08 RX ORDER — LIDOCAINE HYDROCHLORIDE 20 MG/ML
INJECTION, SOLUTION INFILTRATION; PERINEURAL AS NEEDED
Status: DISCONTINUED | OUTPATIENT
Start: 2018-05-08 | End: 2018-05-08 | Stop reason: SURG

## 2018-05-08 RX ADMIN — SODIUM CHLORIDE, POTASSIUM CHLORIDE, SODIUM LACTATE AND CALCIUM CHLORIDE 1000 ML: 600; 310; 30; 20 INJECTION, SOLUTION INTRAVENOUS at 14:11

## 2018-05-08 RX ADMIN — PROPOFOL 120 MCG/KG/MIN: 10 INJECTION, EMULSION INTRAVENOUS at 14:58

## 2018-05-08 RX ADMIN — LIDOCAINE HYDROCHLORIDE 50 MG: 20 INJECTION, SOLUTION INFILTRATION; PERINEURAL at 14:55

## 2018-05-08 RX ADMIN — PROPOFOL 250 MG: 10 INJECTION, EMULSION INTRAVENOUS at 14:55

## 2018-05-08 RX ADMIN — SODIUM CHLORIDE, POTASSIUM CHLORIDE, SODIUM LACTATE AND CALCIUM CHLORIDE: 600; 310; 30; 20 INJECTION, SOLUTION INTRAVENOUS at 14:50

## 2018-05-08 NOTE — ANESTHESIA PREPROCEDURE EVALUATION
Anesthesia Evaluation     Patient summary reviewed and Nursing notes reviewed   history of anesthetic complications (CRYING):  NPO Solid Status: > 8 hours  NPO Liquid Status: > 6 hours           Airway   Mallampati: III  TM distance: <3 FB  Neck ROM: limited  Possible difficult intubation  Dental - normal exam     Pulmonary - normal exam    breath sounds clear to auscultation  (+) sleep apnea on CPAP,   Cardiovascular - normal exam    ECG reviewed  Rhythm: regular  Rate: normal    (+) hypertension, CAD, cardiac stents DVT, hyperlipidemia,       Neuro/Psych  (+) TIA, headaches,     GI/Hepatic/Renal/Endo    (+) obesity,  GERD, GI bleeding, diabetes mellitus type 2, hypothyroidism,   (-) morbid obesity    Musculoskeletal     (+) neck pain,   Abdominal   (+) obese,    Substance History      OB/GYN negative ob/gyn ROS         Other                        Anesthesia Plan    ASA 3     MAC     intravenous induction   Anesthetic plan and risks discussed with patient.

## 2018-05-08 NOTE — ANESTHESIA POSTPROCEDURE EVALUATION
Patient: Chetan Grimm    Procedure Summary     Date:  05/08/18 Room / Location:  [facility] ENDOSCOPY 10 /  [facility]    Anesthesia Start:  1450 Anesthesia Stop:  1517    Procedures:       ESOPHAGOGASTRODUODENOSCOPY with biopsies (N/A Esophagus)      COLONOSCOPY to ascending colon with biopsies (N/A ) Diagnosis:       Colitis      Rectal bleeding      Gastroesophageal reflux disease, esophagitis presence not specified      Hyperplastic colonic polyp, unspecified part of colon      (Colitis [K52.9])      (Rectal bleeding [K62.5])      (Gastroesophageal reflux disease, esophagitis presence not specified [K21.9])      (Hyperplastic colonic polyp, unspecified part of colon [K63.5])    Surgeon:  Ray Frances MD Provider:  Lisa Maurice MD    Anesthesia Type:  MAC ASA Status:  3          Anesthesia Type: MAC  Last vitals  BP   112/76 (05/08/18 1537)   Temp   36.8 °C (98.3 °F) (05/08/18 1357)   Pulse   70 (05/08/18 1537)   Resp   16 (05/08/18 1537)     SpO2   96 % (05/08/18 1537)     Post Anesthesia Care and Evaluation    Patient location during evaluation: PACU  Patient participation: complete - patient participated  Level of consciousness: awake and alert  Pain management: adequate  Airway patency: patent  Anesthetic complications: No anesthetic complications  PONV Status: none  Cardiovascular status: acceptable  Respiratory status: acceptable  Hydration status: acceptable

## 2018-05-08 NOTE — H&P (VIEW-ONLY)
[facility]  Initial Inpatient Consult Note    Referring Provider: RAMIRO Madden    Reason for Consultation: Rectal bleeding, abnormal imaging, abdominal pain    Subjective     52-year-old male previously followed by  for history of rectal bleeding with internal hemorrhoids.  He also has a history significant for CAD, diabetes mellitus, iron deficiency, vitamin D deficiency, and obesity.  Patient reports waking up yesterday morning with epigastric pain that became progressively worse out the day. Abdominal pain is described as sharp with significant pressure.  Pain is worse with movement.  Better at rest.  He has never had pain like this before.  It is colicky in nature.      Associated symptoms include epigastric bloating, nausea, and vomiting ×2.  He also describes significantly worse indigestion with excessive belching and acid reflux. He also had numerous non bloody, watery, green stools yesterday.  In the emergency room, he was found to have leukocytosis with a CT of the abdomen and pelvis demonstrating enterocolitis.      Patient denies a history of GERD but states for the last 2 months he has noticed mild, intermittent epigastric discomfort with frequent belching and indigestion.  His last colonoscopy was performed March 2017 by  for moderate blood per rectum associated with defecation.  The colonoscopy demonstrated large internal hemorrhoids and one hyperplastic polyp.  After the colonoscopy his rectal bleeding increased so Dr. Victoria banded several of his hemorrhoids.  In the last 4 months, his rectal bleeding has increased again. He contacted Dr. Victoria's office 3 months ago regarding his rectal bleeding but has not received a response.  Patient reports 3 months ago his PCP started him on oral iron for iron deficiency anemia. His Hb is at baseline and well within normal limits.  Additional history includes surgical repair of umbilical hernia over 20 years ago. He now has a  bothersome inguinal hernia and uncomplicated ventral hernia he would like to have evaluated.     He denies blood in his emesis, anorexia, early satiety, dysphagia, odynophagia, or black stools.  He denies rectal pain, itching, or burning.  He denies recent antibiotic use but did undergo shoulder surgery in November 2017.  He denies recent foreign travel, exposure to known contaminated foods or sick contacts.    He has never had upper endoscopic evaluation. No family history of gastrointestinal malignancy.        Past Medical History:  Past Medical History:   Diagnosis Date   • Coronary artery disease    • Cryptococcal pneumonitis 2010    TREATED WITH AMPHOTERACIN B   • Diabetes mellitus    • Disease of thyroid gland     HYPOTHYROID   • Hyperlipidemia    • Hypothyroidism    • ANNEL on CPAP    • Pulmonary nodule    • Testosterone deficiency    • Type 2 diabetes mellitus    • Vitamin D deficiency      Past Surgical History:  Past Surgical History:   Procedure Laterality Date   • ANTERIOR CERVICAL CORPECTOMY W/ FUSION  2006    C2-3   • ANTERIOR CERVICAL DISCECTOMY W/ FUSION  2012    C2-T2 PREVIOUS HARDWARE REMOVED AND REVISED   • ANTERIOR CERVICAL FUSION  2008    C3-5   • CARDIAC CATHETERIZATION N/A 6/16/2016    Procedure: Coronary angiography;  Surgeon: Emiliano Gordon MD;  Location:  [facility];  Service:    • CARDIAC CATHETERIZATION N/A 6/16/2016    Procedure: Left Heart Cath;  Surgeon: Emiliano Gordon MD;  Location:  [facility];  Service:    • CARDIAC CATHETERIZATION N/A 6/16/2016    Procedure: Left ventriculography;  Surgeon: Emiliano Gordon MD;  Location:  [facility];  Service:    • CORONARY ANGIOPLASTY  01/2015   • CORONARY STENT PLACEMENT     • HERNIA REPAIR     • KNEE ARTHROSCOPY        Social History:   Social History   Substance Use Topics   • Smoking status: Never Smoker   • Smokeless tobacco: Never Used   • Alcohol use No      Family  History:  Family History   Problem Relation Age of Onset   • Heart disease Mother    • Hypertension Mother    • Hypertension Father    • Heart disease Father        Home Meds:  Facility-Administered Medications Prior to Admission   Medication Dose Route Frequency Provider Last Rate Last Dose   • nitroglycerin (NITROSTAT) SL tablet 0.4 mg  0.4 mg Sublingual Q5 Min JOCEN Edmond Gordon MD         Prescriptions Prior to Admission   Medication Sig Dispense Refill Last Dose   • ascorbic acid (VITAMIN C) 1000 MG tablet Take 1,000 mg by mouth Daily.   4/18/2018 at Unknown time   • aspirin 81 MG chewable tablet Chew 81 mg daily.   4/18/2018 at Unknown time   • atorvastatin (LIPITOR) 20 MG tablet Take 1 tablet by mouth Daily. (Patient taking differently: Take 40 mg by mouth Every Night.) 30 tablet 5 4/18/2018 at Unknown time   • Cholecalciferol (VITAMIN D3) 5000 units capsule capsule Take 5,000 Units by mouth Daily.   4/18/2018 at Unknown time   • cyclobenzaprine (FLEXERIL) 10 MG tablet Take 10 mg by mouth 2 (two) times a day as needed.   4/18/2018 at Unknown time   • Dapagliflozin-Metformin HCl ER (XIGDUO XR) 5-1000 MG tablet sustained-release 24 hour Take 2 tablets by mouth Daily. (Patient taking differently: Take 1 tablet by mouth Daily.) 60 tablet 5 4/18/2018 at Unknown time   • glipiZIDE (GLUCOTROL) 5 MG tablet Take 1 tablet by mouth 2 (Two) Times a Day Before Meals. 60 tablet 5 4/18/2018 at Unknown time   • glucagon (GLUCAGON EMERGENCY) 1 MG injection Inject 1 mg under the skin 1 (One) Time As Needed for low blood sugar for up to 1 dose. 1 kit 5 4/18/2018 at Unknown time   • glucose blood (FREESTYLE LITE) test strip Use to test 4 times daily 200 each 3 4/18/2018 at Unknown time   • IRON PO Take  by mouth.   4/18/2018 at Unknown time   • Lancets (FREESTYLE) lancets Use to test BG 4 times daily 200 each 3 4/18/2018 at Unknown time   • levothyroxine (SYNTHROID) 125 MCG tablet Take 1 tablet by mouth Daily. 30 tablet  11 4/18/2018 at Unknown time   • lisinopril (PRINIVIL,ZESTRIL) 5 MG tablet Take 5 mg by mouth every night.   4/18/2018 at Unknown time   • Omega-3 Fatty Acids (FISH OIL) 1000 MG capsule capsule Take  by mouth Daily With Breakfast.   4/18/2018 at Unknown time   • TANZEUM 50 MG pen-injector Inject 1 dose under the skin 1 (One) Time Per Week. (Patient taking differently: Inject 1 dose under the skin 1 (One) Time Per Week. Pt takes on Tuesday nights) 4 each 5 4/18/2018 at Unknown time   • TRESIBA FLEXTOUCH 200 UNIT/ML solution pen-injector Inject 46 Units under the skin Daily. (Patient taking differently: Inject 46 Units under the skin Every Night.) 3 pen 5 4/18/2018 at Unknown time   • AXIRON 30 MG/ACT solution 2 pumps under each arm daily 180 mL 0    • clotrimazole-betamethasone (LOTRISONE) 1-0.05 % cream    Not Taking   • ergocalciferol (DRISDOL) 47394 units capsule Take 1 po 3 times q week 36 capsule 1    • HYDROcodone-acetaminophen (NORCO) 5-325 MG per tablet Take 1 tablet by mouth As Needed.   Taking   • mupirocin (BACTROBAN) 2 % ointment Apply  topically As Needed.   Taking   • RELION PEN NEEDLES 29G X 12MM misc Use to inject insulin 2 times 200 each 3      Current Meds:     atorvastatin 10 mg Oral Daily   cholecalciferol 1,000 Units Oral Daily   famotidine 20 mg Intravenous Q12H   hydrocortisone  Rectal BID   levoFLOXacin 750 mg Intravenous Q24H   levothyroxine 125 mcg Oral QAM   metroNIDAZOLE 500 mg Intravenous Q8H   ascorbic acid 1,000 mg Oral Daily     Allergies:  Allergies   Allergen Reactions   • Adhesive Tape      Review of Systems  He has excessive belching and bloating all other systems reviewed and negative     Objective     Vital Signs  Temp:  [97 °F (36.1 °C)-98.4 °F (36.9 °C)] 97.2 °F (36.2 °C)  Heart Rate:  [76-95] 76  Resp:  [16-20] 20  BP: ()/(66-93) 103/70  Physical Exam:  General Appearance:    Alert, cooperative, in no acute distress   Head:    Normocephalic, without obvious abnormality,  atraumatic   Eyes:            Lids and lashes normal, conjunctivae and sclerae normal, no   icterus   Throat:   No oral lesions, no thrush, oral mucosa moist   Neck:   No adenopathy, supple, trachea midline, no thyromegaly, no   carotid bruit, no JVD   Lungs:     Clear to auscultation,respirations regular, even and                   unlabored    Heart:    Regular rhythm and normal rate, normal S1 and S2, no            murmur, no gallop, no rub, no click   Chest Wall:    No abnormalities observed   Abdomen:     Normal bowel sounds, no masses, no organomegaly, soft        epigastric tender, non-distended, no guarding, no rebound                 tenderness   Rectal:     Deferred   Extremities:   no edema, no cyanosis, no redness   Skin:   No bleeding, bruising or rash   Lymph nodes:   No palpable adenopathy   Psychiatric:  Judgement and insight: normal   Orientation to person place and time: normal   Mood and affect: normal   Results Review:   I reviewed the patient's new clinical results.      Results from last 7 days  Lab Units 04/19/18  0137   WBC 10*3/mm3 16.49*   HEMOGLOBIN g/dL 15.7   HEMATOCRIT % 46.7   PLATELETS 10*3/mm3 217       Results from last 7 days  Lab Units 04/19/18  0050   SODIUM mmol/L 138   POTASSIUM mmol/L 4.7   CHLORIDE mmol/L 101   CO2 mmol/L 22.3   BUN mg/dL 13   CREATININE mg/dL 0.68*   CALCIUM mg/dL 8.9   BILIRUBIN mg/dL 1.0   ALK PHOS U/L 88   ALT (SGPT) U/L 24   AST (SGOT) U/L 21   GLUCOSE mg/dL 167*       Results from last 7 days  Lab Units 04/19/18  0050   INR  0.97       Lab Results  Lab Value Date/Time   LIPASE 14 04/19/2018 0050   LIPASE 20 06/16/2016 1105       Radiology:  CT Abdomen Pelvis With Contrast   Preliminary Result   1.  Mild to moderately severe enterocolitis is suspected, no   obstruction, perforation or abscess.    2.  1.3 cm nodule at the left lung base is unchanged. Six-month   follow-up is recommended.              XR Chest 2 View   Preliminary Result   No acute  findings.                I have visualized the imaging myself, I see no perforation, there is some mild thickening of small bowel loops, this may be an enteritis    Assessment/Plan   Patient Active Problem List   Diagnosis   • Diabetes mellitus   • Hypersomnia with sleep apnea   • ANNEL on CPAP   • Hypercholesterolemia   • Primary hypothyroidism   • Hyperlipidemia   • Chronic pain   • Low testosterone   • Vitamin D deficiency   • Hypogonadism in male   • Chronic coronary artery disease   • Obesity (BMI 30-39.9)   • Benign essential HTN   • Colitis     Impression/ Plan   1. Enterocolitis by imaging  2. N/V/D- secondary to #1  3. Leukocytosis  4. Acid reflux symptoms  5. Chronic rectal bleeding with known large internal hemorrhoids- non bloody diarrhea yesterday  6. H/o anemia- Vitamin D deficiency and iron deficiency   7. Large IH and hyperplastic polyp- per colonoscopy 3/2017    Anusol suppositories for IH  Check stool studies  Continue symptomatic treatment  Continue antibiotics  Monitor labs and H&H   Pepcid BID until C.diff infection ruled out then change to PPI   He will need EGD and colonoscopy in 8 weeks when acute issues resolve for symptoms and to r/o additional sources of his anemia.  If UGI symptoms persist this admission consider EGD prior to discharge.   We will also refer him to surgery as an outpatient for hemorrhoidectomy and hernia evaluation when acute issues resolve.  Patient would like to change his GI care to [facility] since all of his physicians are associated with [facility].     Office visit with my nurse practitioner 4-6 weeks upon discharge        I discussed the patients findings and my recommendations with patient, family and nursing staff.    Ray Frances MD

## 2018-05-10 ENCOUNTER — PREP FOR SURGERY (OUTPATIENT)
Dept: OTHER | Facility: HOSPITAL | Age: 53
End: 2018-05-10

## 2018-05-10 DIAGNOSIS — R19.7 DIARRHEA: Primary | ICD-10-CM

## 2018-05-10 LAB
CYTO UR: NORMAL
LAB AP CASE REPORT: NORMAL
LAB AP CLINICAL INFORMATION: NORMAL
Lab: NORMAL
PATH REPORT.FINAL DX SPEC: NORMAL
PATH REPORT.GROSS SPEC: NORMAL

## 2018-05-10 NOTE — PROGRESS NOTES
Pathology benign  Needs a colonoscopy in 1-2 months because of poor prep  He should do 2 days of clear liquids  He should do commercial split dose prep such as Suprep but add one bottle of magnesium citrate the night before and one bottle of magnesium citrate the morning of   Case request is in

## 2018-05-14 ENCOUNTER — OFFICE VISIT (OUTPATIENT)
Dept: SURGERY | Facility: CLINIC | Age: 53
End: 2018-05-14

## 2018-05-14 VITALS — BODY MASS INDEX: 37.83 KG/M2 | OXYGEN SATURATION: 98 % | HEART RATE: 75 BPM | WEIGHT: 285.4 LBS | HEIGHT: 73 IN

## 2018-05-14 DIAGNOSIS — K40.91 RECURRENT RIGHT INGUINAL HERNIA: Primary | ICD-10-CM

## 2018-05-14 DIAGNOSIS — M62.08 RECTUS DIASTASIS: ICD-10-CM

## 2018-05-14 PROCEDURE — 99204 OFFICE O/P NEW MOD 45 MIN: CPT | Performed by: SURGERY

## 2018-05-17 NOTE — PROGRESS NOTES
SUMMARY (A/P):    52-year-old gentleman with several problems that we discussed today:    1.  Moderate recurrent right inguinal hernia that is symptomatic and has previously been repaired laparoscopically.  I recommended open right inguinal hernia repair and explained the rationale for the approach and the nature of the procedure and the expected recovery.  He understands the nature of the procedure and the risks including but not limited to bleeding, infection, use of mesh, and recurrence.    2.  Rectus diastases.  Explained the nature of this finding and the fact that no further diagnostic workup or treatment is warranted.    3.  Recent hospitalization for what sounds to have been basically a viral gastroenteritis.  I personally did not see any evidence of so-called enterocolitis on his CT scan during the course of that admission. Repeat colonoscopy was recommended in short order due to poor prep.  He had a normal colonoscopy in 2017 and on this hospitalization colonoscopy visualized mucosa throughout the length of the colon was normal and biopsies were normal.  I personally feel that as his symptoms have resolved he does not need to repeat colonoscopy yet again until he is due for surveillance based on his 2017 colonoscopy or if his symptoms were to recur.    4.  Coronary artery disease and morbid obesity.  He understands that both of these factors increased his risks associated with surgery and recurrence of hernia.  He is to see a cardiologist in the next few weeks and once he obtains clearance can schedule for surgery.    5.  Pulmonary nodules.  On CT abdomen and pelvis short-term follow-up was recommended.  However, on CT angiogram of the chest these were read as scattered noncalcified nodules that have been unchanged in appearance from 2016.  No further follow-up warranted.      CC:    Referred for consultation by Dr. Vanessa Jennings regarding hernia    HPI:    52-year-old gentleman presents with mild  epigastric abdominal pain associated with bulge for several months and moderate burning right inguinal pain also associated bulge for the last year.    PSH:    -Laparoscopic right inguinal hernia repair and open umbilical hernia repair 1996  -3 cervical spine surgeries  -Coronary stent (last saw cardiologist 2015, has appointment next month)  -Colonoscopy 2017    PMH:    Coronary artery disease  Sleep apnea  Diabetes  Hypothyroidism    FAMILY HISTORY:    Negative for colorectal cancer    SOCIAL HISTORY:   Denies tobacco use  Denies alcohol use    ALLERGIES: reviewed, in Epic    MEDICATIONS: reviewed, in Epic    ROS:  No chest pain or shortness of air.  All other systems reviewed and negative other than presenting complaints.    RADIOLOGY/ENDOSCOPY:    -CT abdomen pelvis on 4/19/18 was read as mild to moderately severe enterocolitis.  1.3 cm nodule of the left lung base unchanged.  On my review the images he has a moderately distended stomach and proximal duodenum and I see no evidence of enterocolitis.  On my review of images he also has bilateral inguinal hernias, both fatty containing, right greater than left  -EGD 5/8/2018 including biopsies was normal  -Colonoscopy 5/8/2018 including random biopsies was normal.  Bowel preparation was poor but on my review of the pictures from the endoscopy all visualized mucosa appeared grossly normal.    PHYSICAL EXAM:   Constitutional: Well-developed well-nourished, no acute distress  Vital signs: Heart rate 75, weight 285 pounds, height 73 inches, BMI 37.7  Discussed with patient increased perioperative risks associated with obesity including increased risks of DVT, infection, seromas, poor wound healing and hernias (with abdominal surgery).  Eyes: Conjunctiva normal, sclera nonicteric  ENMT: Hearing grossly normal, oral mucosa moist  Neck: Supple, no palpable mass, normal thyroid, trachea midline  Respiratory: Clear to auscultation, normal inspiratory effort  Cardiovascular:  Regular rate, no murmur, no carotid bruit, no peripheral edema, no jugular venous distention  Gastrointestinal: Soft, nontender, no palpable mass, no hepatosplenomegaly, moderate rectus diastases but negative for hernia, bowel sounds normal  Genitourinary: Moderate right inguinal hernia, no evidence of left inguinal hernia, testicles descended and normal  Lymphatics (palpable nodes):  cervical-negative, axillary-negative, inguinal-negative  Skin:  Warm, dry, no rash on visualized skin surfaces  Musculoskeletal: Symmetric strength, normal gait  Psychiatric: Alert and oriented ×3, normal affect     CECILLE JOHNSTON M.D.

## 2018-05-18 ENCOUNTER — TELEPHONE (OUTPATIENT)
Dept: ENDOCRINOLOGY | Age: 53
End: 2018-05-18

## 2018-05-18 PROBLEM — G62.9 PERIPHERAL POLYNEUROPATHY: Status: ACTIVE | Noted: 2018-05-18

## 2018-05-18 RX ORDER — GABAPENTIN 100 MG/1
100 CAPSULE ORAL 3 TIMES DAILY
Qty: 90 CAPSULE | Refills: 1 | OUTPATIENT
Start: 2018-05-18 | End: 2018-08-13 | Stop reason: SDUPTHER

## 2018-05-18 NOTE — TELEPHONE ENCOUNTER
----- Message from DEREK Kapoor sent at 5/18/2018 12:08 PM EDT -----  Contact: PATIENT  Rx put in emr to be called in for gabapentin. hailey reviewed in media  ----- Message -----  From: Parisa Chadwick MA  Sent: 5/18/2018  11:50 AM  To: DEREK Kapoor        ----- Message -----  From: Sebastián Cohen  Sent: 5/18/2018  11:29 AM  To: Parisa Chadwick MA    PATIENT STATED THAT HE SEEN [facility] LAST WEEK AND THEY STARTED HIM ON A COMPOUND FOR HIS FEET, [facility] TOLD HIM IF IT DID NOT WORK TO LET HER KNOW AND SHE WOULD START HIM ON GABAPENTIN,     PATIENT WOULD LIKE THAT SENT TO     [facility] [address] [place] [address] [id number] [place] [address] - [phone]  - [phone] (Phone)  [phone] (Fax)         GABAPENTIN CALLED INTO THE PHARMACY AND THE PATIENT WAS CONTACTED AND INFORMED.

## 2018-05-21 PROBLEM — R19.7 DIARRHEA: Status: ACTIVE | Noted: 2018-05-21

## 2018-05-29 ENCOUNTER — ANESTHESIA (OUTPATIENT)
Dept: GASTROENTEROLOGY | Facility: HOSPITAL | Age: 53
End: 2018-05-29

## 2018-05-29 ENCOUNTER — HOSPITAL ENCOUNTER (OUTPATIENT)
Facility: HOSPITAL | Age: 53
Setting detail: HOSPITAL OUTPATIENT SURGERY
Discharge: HOME | End: 2018-05-29
Attending: INTERNAL MEDICINE | Admitting: INTERNAL MEDICINE

## 2018-05-29 ENCOUNTER — ANESTHESIA EVENT (OUTPATIENT)
Dept: GASTROENTEROLOGY | Facility: HOSPITAL | Age: 53
End: 2018-05-29

## 2018-05-29 VITALS
HEART RATE: 72 BPM | RESPIRATION RATE: 20 BRPM | SYSTOLIC BLOOD PRESSURE: 120 MMHG | OXYGEN SATURATION: 97 % | WEIGHT: 281.9 LBS | BODY MASS INDEX: 36.18 KG/M2 | HEIGHT: 74 IN | DIASTOLIC BLOOD PRESSURE: 79 MMHG | TEMPERATURE: 97.8 F

## 2018-05-29 LAB — GLUCOSE BLDC GLUCOMTR-MCNC: 100 MG/DL (ref 70–130)

## 2018-05-29 PROCEDURE — 82962 GLUCOSE BLOOD TEST: CPT

## 2018-05-29 PROCEDURE — 45378 DIAGNOSTIC COLONOSCOPY: CPT | Performed by: INTERNAL MEDICINE

## 2018-05-29 PROCEDURE — 25010000002 PROPOFOL 10 MG/ML EMULSION: Performed by: ANESTHESIOLOGY

## 2018-05-29 RX ORDER — SODIUM CHLORIDE, SODIUM LACTATE, POTASSIUM CHLORIDE, CALCIUM CHLORIDE 600; 310; 30; 20 MG/100ML; MG/100ML; MG/100ML; MG/100ML
1000 INJECTION, SOLUTION INTRAVENOUS CONTINUOUS
Status: DISCONTINUED | OUTPATIENT
Start: 2018-05-29 | End: 2018-05-29 | Stop reason: HOSPADM

## 2018-05-29 RX ORDER — LIDOCAINE HYDROCHLORIDE 20 MG/ML
INJECTION, SOLUTION INFILTRATION; PERINEURAL AS NEEDED
Status: DISCONTINUED | OUTPATIENT
Start: 2018-05-29 | End: 2018-05-29 | Stop reason: SURG

## 2018-05-29 RX ORDER — PROPOFOL 10 MG/ML
VIAL (ML) INTRAVENOUS AS NEEDED
Status: DISCONTINUED | OUTPATIENT
Start: 2018-05-29 | End: 2018-05-29 | Stop reason: SURG

## 2018-05-29 RX ORDER — PROPOFOL 10 MG/ML
VIAL (ML) INTRAVENOUS CONTINUOUS PRN
Status: DISCONTINUED | OUTPATIENT
Start: 2018-05-29 | End: 2018-05-29 | Stop reason: SURG

## 2018-05-29 RX ORDER — SODIUM CHLORIDE 0.9 % (FLUSH) 0.9 %
3 SYRINGE (ML) INJECTION AS NEEDED
Status: DISCONTINUED | OUTPATIENT
Start: 2018-05-29 | End: 2018-05-29 | Stop reason: HOSPADM

## 2018-05-29 RX ORDER — LIDOCAINE HYDROCHLORIDE 10 MG/ML
0.5 INJECTION, SOLUTION INFILTRATION; PERINEURAL ONCE AS NEEDED
Status: DISCONTINUED | OUTPATIENT
Start: 2018-05-29 | End: 2018-05-29 | Stop reason: HOSPADM

## 2018-05-29 RX ADMIN — PROPOFOL 100 MG: 10 INJECTION, EMULSION INTRAVENOUS at 15:01

## 2018-05-29 RX ADMIN — PROPOFOL 100 MCG/KG/MIN: 10 INJECTION, EMULSION INTRAVENOUS at 15:00

## 2018-05-29 RX ADMIN — SODIUM CHLORIDE, POTASSIUM CHLORIDE, SODIUM LACTATE AND CALCIUM CHLORIDE 1000 ML: 600; 310; 30; 20 INJECTION, SOLUTION INTRAVENOUS at 14:02

## 2018-05-29 RX ADMIN — LIDOCAINE HYDROCHLORIDE 60 MG: 20 INJECTION, SOLUTION INFILTRATION; PERINEURAL at 15:01

## 2018-05-29 NOTE — ANESTHESIA POSTPROCEDURE EVALUATION
Patient: Chetan Grimm    Procedure Summary     Date:  05/29/18 Room / Location:  [facility] ENDOSCOPY 10 / [facility] ENDOSCOPY    Anesthesia Start:  1458 Anesthesia Stop:  1526    Procedure:  COLONOSCOPY to cecum (N/A ) Diagnosis:       Diarrhea      (Diarrhea [R19.7])    Surgeon:  Ray Frances MD Provider:  Dave Crooks MD    Anesthesia Type:  MAC ASA Status:  3          Anesthesia Type: MAC  Last vitals  BP   120/79 (05/29/18 1546)   Temp   36.6 °C (97.8 °F) (05/29/18 1526)   Pulse   72 (05/29/18 1546)   Resp   20 (05/29/18 1546)     SpO2   97 % (05/29/18 1546)     Anesthesia Post Evaluation

## 2018-05-29 NOTE — ANESTHESIA PREPROCEDURE EVALUATION
Anesthesia Evaluation     Patient summary reviewed and Nursing notes reviewed                Airway   Mallampati: I  TM distance: <3 FB  Neck ROM: full  no difficulty expected  Dental - normal exam     Pulmonary - normal exam   (+) sleep apnea,   Cardiovascular - normal exam    (+) hypertension, CAD, DVT, hyperlipidemia,       Neuro/Psych  (+) TIA, headaches,     GI/Hepatic/Renal/Endo    (+) obesity, morbid obesity, GERD, GI bleeding, diabetes mellitus, hypothyroidism,     Musculoskeletal (-) negative ROS    Abdominal  - normal exam    Bowel sounds: normal.   Substance History - negative use     OB/GYN negative ob/gyn ROS         Other                        Anesthesia Plan    ASA 3     MAC     Anesthetic plan and risks discussed with patient.

## 2018-06-06 ENCOUNTER — TELEPHONE (OUTPATIENT)
Dept: GASTROENTEROLOGY | Facility: CLINIC | Age: 53
End: 2018-06-06

## 2018-06-06 NOTE — TELEPHONE ENCOUNTER
Patient has had a repeat colonoscopy on 5/29. No specimens were collected. Patient's health maintenance record updated to reflect the need to repeat colonoscopy in 10 years.

## 2018-06-06 NOTE — TELEPHONE ENCOUNTER
----- Message from Ray Frances MD sent at 5/10/2018 12:08 PM EDT -----  Pathology benign  Needs a colonoscopy in 1-2 months because of poor prep  He should do 2 days of clear liquids  He should do commercial split dose prep such as Suprep but add one bottle of magnesium citrate the night before and one bottle of magnesium citrate the morning of   Case request is in

## 2018-06-11 RX ORDER — ATORVASTATIN CALCIUM 40 MG/1
40 TABLET, FILM COATED ORAL NIGHTLY
COMMUNITY
Start: 2018-05-20

## 2018-06-22 RX ORDER — TESTOSTERONE 30 MG/1.5ML
SOLUTION TOPICAL
Qty: 180 ML | Refills: 0 | OUTPATIENT
Start: 2018-06-22 | End: 2018-08-10

## 2018-07-23 ENCOUNTER — TELEPHONE (OUTPATIENT)
Dept: SURGERY | Facility: CLINIC | Age: 53
End: 2018-07-23

## 2018-07-23 NOTE — TELEPHONE ENCOUNTER
Patient states he is now ready to schedule an open right inguinal hernia repair. Please place orders.

## 2018-07-24 ENCOUNTER — PREP FOR SURGERY (OUTPATIENT)
Dept: OTHER | Facility: HOSPITAL | Age: 53
End: 2018-07-24

## 2018-07-24 DIAGNOSIS — K40.91 UNILATERAL RECURRENT INGUINAL HERNIA WITHOUT OBSTRUCTION OR GANGRENE: Primary | ICD-10-CM

## 2018-07-24 RX ORDER — CEFAZOLIN SODIUM 2 G/100ML
2 INJECTION, SOLUTION INTRAVENOUS ONCE
Status: CANCELLED | OUTPATIENT
Start: 2018-08-17 | End: 2018-08-17

## 2018-07-25 PROBLEM — K40.91 UNILATERAL RECURRENT INGUINAL HERNIA WITHOUT OBSTRUCTION OR GANGRENE: Status: ACTIVE | Noted: 2018-07-25

## 2018-08-08 RX ORDER — GLIPIZIDE 5 MG/1
TABLET ORAL
Qty: 60 TABLET | Refills: 1 | Status: SHIPPED | OUTPATIENT
Start: 2018-08-08 | End: 2018-08-10

## 2018-08-10 ENCOUNTER — APPOINTMENT (OUTPATIENT)
Dept: PREADMISSION TESTING | Facility: HOSPITAL | Age: 53
End: 2018-08-10

## 2018-08-10 VITALS
HEIGHT: 73 IN | BODY MASS INDEX: 36.79 KG/M2 | RESPIRATION RATE: 20 BRPM | HEART RATE: 78 BPM | WEIGHT: 277.6 LBS | DIASTOLIC BLOOD PRESSURE: 70 MMHG | OXYGEN SATURATION: 95 % | TEMPERATURE: 97.9 F | SYSTOLIC BLOOD PRESSURE: 107 MMHG

## 2018-08-10 LAB
ANION GAP SERPL CALCULATED.3IONS-SCNC: 13.6 MMOL/L
BUN BLD-MCNC: 12 MG/DL (ref 6–20)
BUN/CREAT SERPL: 12.9 (ref 7–25)
CALCIUM SPEC-SCNC: 9.5 MG/DL (ref 8.6–10.5)
CHLORIDE SERPL-SCNC: 102 MMOL/L (ref 98–107)
CO2 SERPL-SCNC: 25.4 MMOL/L (ref 22–29)
CREAT BLD-MCNC: 0.93 MG/DL (ref 0.76–1.27)
DEPRECATED RDW RBC AUTO: 45.6 FL (ref 37–54)
ERYTHROCYTE [DISTWIDTH] IN BLOOD BY AUTOMATED COUNT: 13.2 % (ref 11.5–14.5)
GFR SERPL CREATININE-BSD FRML MDRD: 85 ML/MIN/1.73
GLUCOSE BLD-MCNC: 188 MG/DL (ref 65–99)
HCT VFR BLD AUTO: 48.5 % (ref 40.4–52.2)
HGB BLD-MCNC: 16 G/DL (ref 13.7–17.6)
MCH RBC QN AUTO: 31.4 PG (ref 27–32.7)
MCHC RBC AUTO-ENTMCNC: 33 G/DL (ref 32.6–36.4)
MCV RBC AUTO: 95.1 FL (ref 79.8–96.2)
PLATELET # BLD AUTO: 197 10*3/MM3 (ref 140–500)
PMV BLD AUTO: 10.3 FL (ref 6–12)
POTASSIUM BLD-SCNC: 4.6 MMOL/L (ref 3.5–5.2)
RBC # BLD AUTO: 5.1 10*6/MM3 (ref 4.6–6)
SODIUM BLD-SCNC: 141 MMOL/L (ref 136–145)
WBC NRBC COR # BLD: 6.69 10*3/MM3 (ref 4.5–10.7)

## 2018-08-10 PROCEDURE — 85027 COMPLETE CBC AUTOMATED: CPT | Performed by: SURGERY

## 2018-08-10 PROCEDURE — 80048 BASIC METABOLIC PNL TOTAL CA: CPT | Performed by: SURGERY

## 2018-08-10 PROCEDURE — 36415 COLL VENOUS BLD VENIPUNCTURE: CPT

## 2018-08-10 RX ORDER — GLIPIZIDE 5 MG/1
5 TABLET ORAL
COMMUNITY
End: 2019-03-20 | Stop reason: SDUPTHER

## 2018-08-10 RX ORDER — PANTOPRAZOLE SODIUM 40 MG/1
40 TABLET, DELAYED RELEASE ORAL 2 TIMES DAILY
COMMUNITY

## 2018-08-10 NOTE — DISCHARGE INSTRUCTIONS
Take the following medications the morning of surgery with a small sip of water:  PANTOPRAZOLE    CALL ENDOCRINOLOGY REGARDING MEDICINE MANAGEMENT FOR OR    ARRIVAL TIME 0700 TO OUT SURGERY CENTER        General Instructions:  • Do not eat solid food after midnight the night before surgery.  • You may drink clear liquids day of surgery but must stop at least one hour before your hospital arrival time (CUTOFF TIME 0600 AM)  • It is beneficial for you to have a clear drink that contains carbohydrates the day of surgery.  We suggest a 12 to 20 ounce bottle of Gatorade or Powerade for non-diabetic patients or a 12 to 20 ounce bottle of G2 or Powerade Zero for diabetic patients. (Pediatric patients, are not advised to drink a 12 to 20 ounce carbohydrate drink)    Clear liquids are liquids you can see through.  Nothing red in color.     Plain water                               Sports drinks  Sodas                                   Gelatin (Jell-O)  Fruit juices without pulp such as white grape juice and apple juice  Popsicles that contain no fruit or yogurt  Tea or coffee (no cream or milk added)  Gatorade / Powerade  G2 / Powerade Zero    • Infants may have breast milk up to four hours before surgery.  • Infants drinking formula may drink formula up to six hours before surgery.   • Patients who avoid smoking, chewing tobacco and alcohol for 4 weeks prior to surgery have a reduced risk of post-operative complications.  Quit smoking as many days before surgery as you can.  • Do not smoke, use chewing tobacco or drink alcohol the day of surgery.   • If applicable bring your C-PAP/ BI-PAP machine.  • Bring any papers given to you in the doctor’s office.  • Wear clean comfortable clothes and socks.  • Do not wear contact lenses or make-up.  Bring a case for your glasses.   • Bring crutches or walker if applicable.  • Remove all piercings.  Leave jewelry and any other valuables at home.  • Hair extensions with metal clips  must be removed prior to surgery.  • The Pre-Admission Testing nurse will instruct you to bring medications if unable to obtain an accurate list in Pre-Admission Testing.            Preventing a Surgical Site Infection:  • For 2 to 3 days before surgery, avoid shaving with a razor because the razor can irritate skin and make it easier to develop an infection.    • Any areas of open skin can increase the risk of a post-operative wound infection by allowing bacteria to enter and travel throughout the body.  Notify your surgeon if you have any skin wounds / rashes even if it is not near the expected surgical site.  The area will need assessed to determine if surgery should be delayed until it is healed.  • The night prior to surgery sleep in a clean bed with clean clothing.  Do not allow pets to sleep with you.  • Shower on the morning of surgery using a fresh bar of anti-bacterial soap (such as Dial) and clean washcloth.  Dry with a clean towel and dress in clean clothing.  • Ask your surgeon if you will be receiving antibiotics prior to surgery.  • Make sure you, your family, and all healthcare providers clean their hands with soap and water or an alcohol based hand  before caring for you or your wound.    Day of surgery:  Upon arrival, a Pre-op nurse and Anesthesiologist will review your health history, obtain vital signs, and answer questions you may have.  The only belongings needed at this time will be your home medications and if applicable your C-PAP/BI-PAP machine.  If you are staying overnight your family can leave the rest of your belongings in the car and bring them to your room later.  A Pre-op nurse will start an IV and you may receive medication in preparation for surgery, including something to help you relax.  Your family will be able to see you in the Pre-op area.  While you are in surgery your family should notify the waiting room  if they leave the waiting room area and provide  a contact phone number.    Please be aware that surgery does come with discomfort.  We want to make every effort to control your discomfort so please discuss any uncontrolled symptoms with your nurse.   Your doctor will most likely have prescribed pain medications.      If you are going home after surgery you will receive individualized written care instructions before being discharged.  A responsible adult must drive you to and from the hospital on the day of your surgery and stay with you for 24 hours.    If you are staying overnight following surgery, you will be transported to your hospital room following the recovery period.  [facility] has all private rooms.    You have received a list of surgical assistants for your reference.  If you have any questions please call Pre-Admission Testing at [phone].  Deductibles and co-payments are collected on the day of service. Please be prepared to pay the required co-pay, deductible or deposit on the day of service as defined by your plan.

## 2018-08-13 ENCOUNTER — PREP FOR SURGERY (OUTPATIENT)
Dept: OTHER | Facility: HOSPITAL | Age: 53
End: 2018-08-13

## 2018-08-13 RX ORDER — GABAPENTIN 100 MG/1
CAPSULE ORAL
Qty: 90 CAPSULE | Refills: 1 | OUTPATIENT
Start: 2018-08-13 | End: 2018-11-17 | Stop reason: SDUPTHER

## 2018-08-17 ENCOUNTER — HOSPITAL ENCOUNTER (OUTPATIENT)
Facility: HOSPITAL | Age: 53
Discharge: HOME | End: 2018-08-18
Attending: SURGERY | Admitting: SURGERY

## 2018-08-17 ENCOUNTER — ANESTHESIA EVENT (OUTPATIENT)
Dept: PERIOP | Facility: HOSPITAL | Age: 53
End: 2018-08-17

## 2018-08-17 ENCOUNTER — ANESTHESIA (OUTPATIENT)
Dept: PERIOP | Facility: HOSPITAL | Age: 53
End: 2018-08-17

## 2018-08-17 DIAGNOSIS — R00.2 PALPITATIONS: ICD-10-CM

## 2018-08-17 DIAGNOSIS — K40.91 UNILATERAL RECURRENT INGUINAL HERNIA WITHOUT OBSTRUCTION OR GANGRENE: ICD-10-CM

## 2018-08-17 DIAGNOSIS — I20.9 ISCHEMIC CHEST PAIN (HCC): ICD-10-CM

## 2018-08-17 DIAGNOSIS — R06.02 SHORTNESS OF BREATH ON EXERTION: ICD-10-CM

## 2018-08-17 LAB
GLUCOSE BLDC GLUCOMTR-MCNC: 149 MG/DL (ref 70–130)
GLUCOSE BLDC GLUCOMTR-MCNC: 154 MG/DL (ref 70–130)
GLUCOSE BLDC GLUCOMTR-MCNC: 175 MG/DL (ref 70–130)

## 2018-08-17 PROCEDURE — 25010000002 KETOROLAC TROMETHAMINE PER 15 MG: Performed by: NURSE ANESTHETIST, CERTIFIED REGISTERED

## 2018-08-17 PROCEDURE — 82962 GLUCOSE BLOOD TEST: CPT

## 2018-08-17 PROCEDURE — 25010000002 HYDROMORPHONE PER 4 MG: Performed by: SURGERY

## 2018-08-17 PROCEDURE — G0378 HOSPITAL OBSERVATION PER HR: HCPCS

## 2018-08-17 PROCEDURE — 25010000002 FENTANYL CITRATE (PF) 100 MCG/2ML SOLUTION: Performed by: NURSE ANESTHETIST, CERTIFIED REGISTERED

## 2018-08-17 PROCEDURE — 25010000002 ONDANSETRON PER 1 MG: Performed by: SURGERY

## 2018-08-17 PROCEDURE — C1781 MESH (IMPLANTABLE): HCPCS | Performed by: SURGERY

## 2018-08-17 PROCEDURE — 25010000002 CEFAZOLIN PER 500 MG: Performed by: SURGERY

## 2018-08-17 PROCEDURE — 94799 UNLISTED PULMONARY SVC/PX: CPT

## 2018-08-17 PROCEDURE — 25010000002 HYDROMORPHONE PER 4 MG: Performed by: NURSE ANESTHETIST, CERTIFIED REGISTERED

## 2018-08-17 PROCEDURE — 25010000002 PROPOFOL 10 MG/ML EMULSION: Performed by: NURSE ANESTHETIST, CERTIFIED REGISTERED

## 2018-08-17 PROCEDURE — 25010000003 CEFAZOLIN IN DEXTROSE 2-4 GM/100ML-% SOLUTION: Performed by: SURGERY

## 2018-08-17 PROCEDURE — 25010000002 ONDANSETRON PER 1 MG: Performed by: NURSE ANESTHETIST, CERTIFIED REGISTERED

## 2018-08-17 PROCEDURE — 49520 REREPAIR ING HERNIA REDUCE: CPT | Performed by: PHYSICIAN ASSISTANT

## 2018-08-17 PROCEDURE — 25010000002 PROMETHAZINE PER 50 MG: Performed by: NURSE ANESTHETIST, CERTIFIED REGISTERED

## 2018-08-17 PROCEDURE — 49520 REREPAIR ING HERNIA REDUCE: CPT | Performed by: SURGERY

## 2018-08-17 PROCEDURE — 25010000002 MIDAZOLAM PER 1 MG: Performed by: ANESTHESIOLOGY

## 2018-08-17 PROCEDURE — 25010000002 SUCCINYLCHOLINE PER 20 MG: Performed by: NURSE ANESTHETIST, CERTIFIED REGISTERED

## 2018-08-17 DEVICE — BARD MESH PERFIX PLUG, LARGE
Type: IMPLANTABLE DEVICE | Site: INGUINAL | Status: FUNCTIONAL
Brand: BARD MESH PERFIX PLUG

## 2018-08-17 RX ORDER — LIDOCAINE HYDROCHLORIDE 20 MG/ML
INJECTION, SOLUTION INFILTRATION; PERINEURAL AS NEEDED
Status: DISCONTINUED | OUTPATIENT
Start: 2018-08-17 | End: 2018-08-17 | Stop reason: SURG

## 2018-08-17 RX ORDER — FENTANYL CITRATE 50 UG/ML
INJECTION, SOLUTION INTRAMUSCULAR; INTRAVENOUS AS NEEDED
Status: DISCONTINUED | OUTPATIENT
Start: 2018-08-17 | End: 2018-08-17 | Stop reason: SURG

## 2018-08-17 RX ORDER — CEFAZOLIN SODIUM 2 G/100ML
2 INJECTION, SOLUTION INTRAVENOUS ONCE
Status: COMPLETED | OUTPATIENT
Start: 2018-08-17 | End: 2018-08-17

## 2018-08-17 RX ORDER — OXYCODONE HYDROCHLORIDE AND ACETAMINOPHEN 5; 325 MG/1; MG/1
2 TABLET ORAL EVERY 4 HOURS PRN
Status: DISCONTINUED | OUTPATIENT
Start: 2018-08-17 | End: 2018-08-18 | Stop reason: HOSPADM

## 2018-08-17 RX ORDER — PROMETHAZINE HYDROCHLORIDE 25 MG/1
25 TABLET ORAL ONCE AS NEEDED
Status: COMPLETED | OUTPATIENT
Start: 2018-08-17 | End: 2018-08-17

## 2018-08-17 RX ORDER — LABETALOL HYDROCHLORIDE 5 MG/ML
5 INJECTION, SOLUTION INTRAVENOUS
Status: DISCONTINUED | OUTPATIENT
Start: 2018-08-17 | End: 2018-08-17 | Stop reason: HOSPADM

## 2018-08-17 RX ORDER — PROMETHAZINE HYDROCHLORIDE 25 MG/ML
12.5 INJECTION, SOLUTION INTRAMUSCULAR; INTRAVENOUS ONCE AS NEEDED
Status: COMPLETED | OUTPATIENT
Start: 2018-08-17 | End: 2018-08-17

## 2018-08-17 RX ORDER — FENTANYL CITRATE 50 UG/ML
50 INJECTION, SOLUTION INTRAMUSCULAR; INTRAVENOUS
Status: DISCONTINUED | OUTPATIENT
Start: 2018-08-17 | End: 2018-08-17 | Stop reason: HOSPADM

## 2018-08-17 RX ORDER — NALOXONE HCL 0.4 MG/ML
0.2 VIAL (ML) INJECTION AS NEEDED
Status: DISCONTINUED | OUTPATIENT
Start: 2018-08-17 | End: 2018-08-17 | Stop reason: HOSPADM

## 2018-08-17 RX ORDER — NALOXONE HCL 0.4 MG/ML
0.1 VIAL (ML) INJECTION
Status: DISCONTINUED | OUTPATIENT
Start: 2018-08-17 | End: 2018-08-18

## 2018-08-17 RX ORDER — MAGNESIUM HYDROXIDE 1200 MG/15ML
LIQUID ORAL AS NEEDED
Status: DISCONTINUED | OUTPATIENT
Start: 2018-08-17 | End: 2018-08-17 | Stop reason: HOSPADM

## 2018-08-17 RX ORDER — PANTOPRAZOLE SODIUM 40 MG/1
40 TABLET, DELAYED RELEASE ORAL 2 TIMES DAILY
Status: DISCONTINUED | OUTPATIENT
Start: 2018-08-17 | End: 2018-08-18 | Stop reason: HOSPADM

## 2018-08-17 RX ORDER — PROPOFOL 10 MG/ML
VIAL (ML) INTRAVENOUS AS NEEDED
Status: DISCONTINUED | OUTPATIENT
Start: 2018-08-17 | End: 2018-08-17 | Stop reason: SURG

## 2018-08-17 RX ORDER — EPHEDRINE SULFATE 50 MG/ML
5 INJECTION, SOLUTION INTRAVENOUS ONCE AS NEEDED
Status: DISCONTINUED | OUTPATIENT
Start: 2018-08-17 | End: 2018-08-17 | Stop reason: HOSPADM

## 2018-08-17 RX ORDER — MIDAZOLAM HYDROCHLORIDE 1 MG/ML
1 INJECTION INTRAMUSCULAR; INTRAVENOUS
Status: DISCONTINUED | OUTPATIENT
Start: 2018-08-17 | End: 2018-08-17 | Stop reason: HOSPADM

## 2018-08-17 RX ORDER — OXYCODONE HYDROCHLORIDE AND ACETAMINOPHEN 5; 325 MG/1; MG/1
1-2 TABLET ORAL EVERY 4 HOURS PRN
Qty: 40 TABLET | Refills: 0 | Status: SHIPPED | OUTPATIENT
Start: 2018-08-17 | End: 2018-10-02 | Stop reason: ALTCHOICE

## 2018-08-17 RX ORDER — MIDAZOLAM HYDROCHLORIDE 1 MG/ML
2 INJECTION INTRAMUSCULAR; INTRAVENOUS
Status: DISCONTINUED | OUTPATIENT
Start: 2018-08-17 | End: 2018-08-17 | Stop reason: HOSPADM

## 2018-08-17 RX ORDER — FAMOTIDINE 10 MG/ML
20 INJECTION, SOLUTION INTRAVENOUS ONCE
Status: COMPLETED | OUTPATIENT
Start: 2018-08-17 | End: 2018-08-17

## 2018-08-17 RX ORDER — LISINOPRIL 5 MG/1
5 TABLET ORAL NIGHTLY
Status: DISCONTINUED | OUTPATIENT
Start: 2018-08-17 | End: 2018-08-18 | Stop reason: HOSPADM

## 2018-08-17 RX ORDER — PROMETHAZINE HYDROCHLORIDE 25 MG/1
25 SUPPOSITORY RECTAL ONCE AS NEEDED
Status: COMPLETED | OUTPATIENT
Start: 2018-08-17 | End: 2018-08-17

## 2018-08-17 RX ORDER — SUCCINYLCHOLINE CHLORIDE 20 MG/ML
INJECTION INTRAMUSCULAR; INTRAVENOUS AS NEEDED
Status: DISCONTINUED | OUTPATIENT
Start: 2018-08-17 | End: 2018-08-17 | Stop reason: SURG

## 2018-08-17 RX ORDER — ONDANSETRON 2 MG/ML
INJECTION INTRAMUSCULAR; INTRAVENOUS AS NEEDED
Status: DISCONTINUED | OUTPATIENT
Start: 2018-08-17 | End: 2018-08-17 | Stop reason: SURG

## 2018-08-17 RX ORDER — FLUMAZENIL 0.1 MG/ML
0.2 INJECTION INTRAVENOUS AS NEEDED
Status: DISCONTINUED | OUTPATIENT
Start: 2018-08-17 | End: 2018-08-17 | Stop reason: HOSPADM

## 2018-08-17 RX ORDER — BUPIVACAINE HYDROCHLORIDE AND EPINEPHRINE 5; 5 MG/ML; UG/ML
INJECTION, SOLUTION PERINEURAL AS NEEDED
Status: DISCONTINUED | OUTPATIENT
Start: 2018-08-17 | End: 2018-08-17 | Stop reason: HOSPADM

## 2018-08-17 RX ORDER — LIDOCAINE HYDROCHLORIDE 10 MG/ML
0.5 INJECTION, SOLUTION EPIDURAL; INFILTRATION; INTRACAUDAL; PERINEURAL ONCE AS NEEDED
Status: COMPLETED | OUTPATIENT
Start: 2018-08-17 | End: 2018-08-17

## 2018-08-17 RX ORDER — ONDANSETRON 2 MG/ML
4 INJECTION INTRAMUSCULAR; INTRAVENOUS EVERY 4 HOURS PRN
Status: DISCONTINUED | OUTPATIENT
Start: 2018-08-17 | End: 2018-08-18 | Stop reason: HOSPADM

## 2018-08-17 RX ORDER — ONDANSETRON 2 MG/ML
4 INJECTION INTRAMUSCULAR; INTRAVENOUS ONCE AS NEEDED
Status: COMPLETED | OUTPATIENT
Start: 2018-08-17 | End: 2018-08-17

## 2018-08-17 RX ORDER — SODIUM CHLORIDE 9 MG/ML
75 INJECTION, SOLUTION INTRAVENOUS CONTINUOUS
Status: DISCONTINUED | OUTPATIENT
Start: 2018-08-17 | End: 2018-08-18

## 2018-08-17 RX ORDER — LEVOTHYROXINE SODIUM 0.12 MG/1
125 TABLET ORAL
Status: DISCONTINUED | OUTPATIENT
Start: 2018-08-17 | End: 2018-08-18 | Stop reason: HOSPADM

## 2018-08-17 RX ORDER — NITROGLYCERIN 0.4 MG/1
0.4 TABLET SUBLINGUAL
Status: DISCONTINUED | OUTPATIENT
Start: 2018-08-17 | End: 2018-08-18 | Stop reason: HOSPADM

## 2018-08-17 RX ORDER — KETOROLAC TROMETHAMINE 30 MG/ML
INJECTION, SOLUTION INTRAMUSCULAR; INTRAVENOUS AS NEEDED
Status: DISCONTINUED | OUTPATIENT
Start: 2018-08-17 | End: 2018-08-17 | Stop reason: SURG

## 2018-08-17 RX ORDER — NICOTINE POLACRILEX 4 MG
15 LOZENGE BUCCAL
Status: DISCONTINUED | OUTPATIENT
Start: 2018-08-17 | End: 2018-08-18 | Stop reason: HOSPADM

## 2018-08-17 RX ORDER — PROMETHAZINE HYDROCHLORIDE 25 MG/1
12.5 TABLET ORAL ONCE AS NEEDED
Status: DISCONTINUED | OUTPATIENT
Start: 2018-08-17 | End: 2018-08-17 | Stop reason: HOSPADM

## 2018-08-17 RX ORDER — GLIPIZIDE 5 MG/1
5 TABLET ORAL
Status: DISCONTINUED | OUTPATIENT
Start: 2018-08-17 | End: 2018-08-18 | Stop reason: HOSPADM

## 2018-08-17 RX ORDER — ONDANSETRON 4 MG/1
4 TABLET, FILM COATED ORAL EVERY 6 HOURS PRN
Qty: 10 TABLET | Refills: 1 | Status: SHIPPED | OUTPATIENT
Start: 2018-08-17 | End: 2018-10-02 | Stop reason: ALTCHOICE

## 2018-08-17 RX ORDER — ROCURONIUM BROMIDE 10 MG/ML
INJECTION, SOLUTION INTRAVENOUS AS NEEDED
Status: DISCONTINUED | OUTPATIENT
Start: 2018-08-17 | End: 2018-08-17 | Stop reason: SURG

## 2018-08-17 RX ORDER — CEFAZOLIN SODIUM IN 0.9 % NACL 3 G/100 ML
3 INTRAVENOUS SOLUTION, PIGGYBACK (ML) INTRAVENOUS EVERY 8 HOURS
Status: COMPLETED | OUTPATIENT
Start: 2018-08-17 | End: 2018-08-18

## 2018-08-17 RX ORDER — DEXTROSE MONOHYDRATE 25 G/50ML
25 INJECTION, SOLUTION INTRAVENOUS
Status: DISCONTINUED | OUTPATIENT
Start: 2018-08-17 | End: 2018-08-18 | Stop reason: HOSPADM

## 2018-08-17 RX ORDER — OXYCODONE AND ACETAMINOPHEN 7.5; 325 MG/1; MG/1
1 TABLET ORAL ONCE AS NEEDED
Status: COMPLETED | OUTPATIENT
Start: 2018-08-17 | End: 2018-08-17

## 2018-08-17 RX ORDER — SODIUM CHLORIDE, SODIUM LACTATE, POTASSIUM CHLORIDE, CALCIUM CHLORIDE 600; 310; 30; 20 MG/100ML; MG/100ML; MG/100ML; MG/100ML
9 INJECTION, SOLUTION INTRAVENOUS CONTINUOUS
Status: DISCONTINUED | OUTPATIENT
Start: 2018-08-17 | End: 2018-08-17 | Stop reason: HOSPADM

## 2018-08-17 RX ORDER — HYDROCODONE BITARTRATE AND ACETAMINOPHEN 7.5; 325 MG/1; MG/1
1 TABLET ORAL ONCE AS NEEDED
Status: DISCONTINUED | OUTPATIENT
Start: 2018-08-17 | End: 2018-08-17 | Stop reason: HOSPADM

## 2018-08-17 RX ORDER — SODIUM CHLORIDE 0.9 % (FLUSH) 0.9 %
1-10 SYRINGE (ML) INJECTION AS NEEDED
Status: DISCONTINUED | OUTPATIENT
Start: 2018-08-17 | End: 2018-08-17 | Stop reason: HOSPADM

## 2018-08-17 RX ORDER — DIPHENHYDRAMINE HYDROCHLORIDE 50 MG/ML
12.5 INJECTION INTRAMUSCULAR; INTRAVENOUS
Status: DISCONTINUED | OUTPATIENT
Start: 2018-08-17 | End: 2018-08-17 | Stop reason: HOSPADM

## 2018-08-17 RX ADMIN — FAMOTIDINE 20 MG: 10 INJECTION, SOLUTION INTRAVENOUS at 08:14

## 2018-08-17 RX ADMIN — ONDANSETRON 4 MG: 2 INJECTION, SOLUTION INTRAMUSCULAR; INTRAVENOUS at 19:03

## 2018-08-17 RX ADMIN — ROCURONIUM BROMIDE 5 MG: 10 INJECTION INTRAVENOUS at 08:40

## 2018-08-17 RX ADMIN — SODIUM CHLORIDE, POTASSIUM CHLORIDE, SODIUM LACTATE AND CALCIUM CHLORIDE 9 ML/HR: 600; 310; 30; 20 INJECTION, SOLUTION INTRAVENOUS at 10:08

## 2018-08-17 RX ADMIN — KETOROLAC TROMETHAMINE 30 MG: 30 INJECTION, SOLUTION INTRAMUSCULAR; INTRAVENOUS at 09:39

## 2018-08-17 RX ADMIN — SUCCINYLCHOLINE CHLORIDE 100 MG: 20 INJECTION, SOLUTION INTRAMUSCULAR; INTRAVENOUS; PARENTERAL at 08:40

## 2018-08-17 RX ADMIN — SODIUM CHLORIDE, POTASSIUM CHLORIDE, SODIUM LACTATE AND CALCIUM CHLORIDE 9 ML/HR: 600; 310; 30; 20 INJECTION, SOLUTION INTRAVENOUS at 08:09

## 2018-08-17 RX ADMIN — LIDOCAINE HYDROCHLORIDE 60 MG: 20 INJECTION, SOLUTION INFILTRATION; PERINEURAL at 08:40

## 2018-08-17 RX ADMIN — MIDAZOLAM HYDROCHLORIDE 2 MG: 2 INJECTION, SOLUTION INTRAMUSCULAR; INTRAVENOUS at 08:13

## 2018-08-17 RX ADMIN — LIDOCAINE HYDROCHLORIDE 0.5 ML: 10 INJECTION, SOLUTION EPIDURAL; INFILTRATION; INTRACAUDAL; PERINEURAL at 08:09

## 2018-08-17 RX ADMIN — HYDROMORPHONE HYDROCHLORIDE 0.5 MG: 1 INJECTION, SOLUTION INTRAMUSCULAR; INTRAVENOUS; SUBCUTANEOUS at 12:50

## 2018-08-17 RX ADMIN — LEVOTHYROXINE SODIUM 125 MCG: 125 TABLET ORAL at 17:59

## 2018-08-17 RX ADMIN — PANTOPRAZOLE SODIUM 40 MG: 40 TABLET, DELAYED RELEASE ORAL at 20:41

## 2018-08-17 RX ADMIN — OXYCODONE HYDROCHLORIDE AND ACETAMINOPHEN 2 TABLET: 5; 325 TABLET ORAL at 23:51

## 2018-08-17 RX ADMIN — OXYCODONE HYDROCHLORIDE AND ACETAMINOPHEN 1 TABLET: 7.5; 325 TABLET ORAL at 11:12

## 2018-08-17 RX ADMIN — PROPOFOL 300 MG: 10 INJECTION, EMULSION INTRAVENOUS at 08:40

## 2018-08-17 RX ADMIN — ONDANSETRON 4 MG: 2 INJECTION INTRAMUSCULAR; INTRAVENOUS at 10:18

## 2018-08-17 RX ADMIN — SUGAMMADEX 200 MG: 100 INJECTION, SOLUTION INTRAVENOUS at 09:43

## 2018-08-17 RX ADMIN — HYDROMORPHONE HYDROCHLORIDE 0.5 MG: 1 INJECTION, SOLUTION INTRAMUSCULAR; INTRAVENOUS; SUBCUTANEOUS at 11:55

## 2018-08-17 RX ADMIN — HYDROMORPHONE HYDROCHLORIDE 0.5 MG: 1 INJECTION, SOLUTION INTRAMUSCULAR; INTRAVENOUS; SUBCUTANEOUS at 15:42

## 2018-08-17 RX ADMIN — SODIUM CHLORIDE 75 ML/HR: 9 INJECTION, SOLUTION INTRAVENOUS at 14:57

## 2018-08-17 RX ADMIN — GLIPIZIDE 5 MG: 5 TABLET ORAL at 17:59

## 2018-08-17 RX ADMIN — FENTANYL CITRATE 100 MCG: 50 INJECTION INTRAMUSCULAR; INTRAVENOUS at 08:38

## 2018-08-17 RX ADMIN — HYDROMORPHONE HYDROCHLORIDE 0.5 MG: 1 INJECTION, SOLUTION INTRAMUSCULAR; INTRAVENOUS; SUBCUTANEOUS at 11:28

## 2018-08-17 RX ADMIN — SODIUM CHLORIDE 75 ML/HR: 9 INJECTION, SOLUTION INTRAVENOUS at 21:38

## 2018-08-17 RX ADMIN — ONDANSETRON 4 MG: 2 INJECTION, SOLUTION INTRAMUSCULAR; INTRAVENOUS at 15:08

## 2018-08-17 RX ADMIN — LISINOPRIL 5 MG: 5 TABLET ORAL at 20:41

## 2018-08-17 RX ADMIN — HYDROMORPHONE HYDROCHLORIDE 0.5 MG: 1 INJECTION, SOLUTION INTRAMUSCULAR; INTRAVENOUS; SUBCUTANEOUS at 13:36

## 2018-08-17 RX ADMIN — PROMETHAZINE HYDROCHLORIDE 12.5 MG: 25 INJECTION, SOLUTION INTRAMUSCULAR; INTRAVENOUS at 10:24

## 2018-08-17 RX ADMIN — FENTANYL CITRATE 50 MCG: 50 INJECTION INTRAMUSCULAR; INTRAVENOUS at 10:19

## 2018-08-17 RX ADMIN — SODIUM CHLORIDE 3 G: 9 INJECTION, SOLUTION INTRAVENOUS at 17:59

## 2018-08-17 RX ADMIN — CEFAZOLIN SODIUM 2 G: 2 INJECTION, SOLUTION INTRAVENOUS at 08:46

## 2018-08-17 RX ADMIN — FENTANYL CITRATE 50 MCG: 50 INJECTION INTRAMUSCULAR; INTRAVENOUS at 10:26

## 2018-08-17 RX ADMIN — HYDROMORPHONE HYDROCHLORIDE 0.5 MG: 1 INJECTION, SOLUTION INTRAMUSCULAR; INTRAVENOUS; SUBCUTANEOUS at 19:03

## 2018-08-17 RX ADMIN — ONDANSETRON 4 MG: 2 INJECTION, SOLUTION INTRAMUSCULAR; INTRAVENOUS at 23:51

## 2018-08-17 RX ADMIN — ONDANSETRON 4 MG: 2 INJECTION INTRAMUSCULAR; INTRAVENOUS at 09:39

## 2018-08-17 NOTE — OP NOTE
PREOPERATIVE DIAGNOSIS:  Recurrent right inguinal hernia    POSTOPERATIVE DIAGNOSIS (FINDINGS):  Large recurrent indirect right inguinal hernia    PROCEDURE:  Open recurrent right inguinal hernia repair with large mesh plug    SURGEON:  Bill Ca MD    ASSISTANT:  Deacon Ly    ANESTHESIA:  General    EBL:  Minimal    SPECIMEN(S):  none    DESCRIPTION:  In supine position under general anesthetic prepped and draped usual sterile manner.  Transverse incision made in the right inguinal region carried down to the external oblique which was incised revealing a large indirect recurrent right inguinal hernia.  The spermatic cord was skeletonized and a Penrose was placed around the vessels and vas deferens and retracted medially.  A large indirect hernia consisting of herniated preperitoneal fat was dissected free from the spermatic cord  and reduced.  The internal ring was filled with a large mesh plug which was sutured inferiorly to the shelving edge of Poupart's ligament with interrupted 0 Ethibond and superiorly to the conjoined aponeurosis with 0 Ethibond.  The onlay mesh was applied and secured to the shelving edge of Poupart's ligament with interrupted 0 Ethibond and to the anterior conjoined aponeurosis with interrupted 0 Ethibond.  The tails were secured lateral to the cord with 0 Ethibond.  Good hemostasis was noted.  External oblique was closed with interrupted 0 Ethibond.  Thaddeus's was closed with running 3-0 Vicryl.  Skin was closed with 0 Vicryl deep dermal and 5-0 Vicryl subcuticular followed by Dermabond.  Tolerated well, stable to recovery room.    Bill Ca M.D.

## 2018-08-17 NOTE — ANESTHESIA POSTPROCEDURE EVALUATION
Patient: Chetan Grimm    Procedure Summary     Date:  08/17/18 Room / Location:   [facility] [address] [place] [address] [facility]    Anesthesia Start:  0836 Anesthesia Stop:  0957    Procedure:  OPEN RIGHT INGUINAL HERNIA REPAIR (Bilateral Groin) Diagnosis:       Unilateral recurrent inguinal hernia without obstruction or gangrene      (Unilateral recurrent inguinal hernia without obstruction or gangrene [K40.91])    Surgeon:  Bill Ca MD Provider:  Deacon Vallejo MD    Anesthesia Type:  general ASA Status:  3          Anesthesia Type: general  Last vitals  BP   126/77 (08/17/18 1300)   Temp   36.1 °C (97 °F) (08/17/18 1300)   Pulse   75 (08/17/18 1315)   Resp   16 (08/17/18 1315)     SpO2   94 % (08/17/18 1315)     Post Anesthesia Care and Evaluation    Patient location during evaluation: bedside  Patient participation: complete - patient participated  Level of consciousness: awake  Pain score: 2  Pain management: adequate  Airway patency: patent  Anesthetic complications: No anesthetic complications    Cardiovascular status: acceptable  Respiratory status: acceptable  Hydration status: acceptable    Comments: /77   Pulse 75   Temp 36.1 °C (97 °F) (Temporal Artery )   Resp 16   SpO2 94%

## 2018-08-17 NOTE — H&P
HPI:  52-year-old gentleman with a symptomatic recurrent right inguinal hernia.  Previous laparoscopic repair.  On CT scan he has a small left inguinal hernia containing only preperitoneal fat and it is not evident on exam or symptomatic.  I recommended proceeding with open right inguinal hernia repair today.    PMH, PSH, MEDS AND ALLERGIES reviewed and reconciled with EPIC    PHYSICAL EXAM:  -  Constitutional:  no acute distress  -  Respiratory:  normal inspiratory effort  -  Cardiovascular: regular rate  -  Gastrointestinal: Soft  -Genitourinary: Reducible right inguinal hernia, no palpable evidence of left inguinal hernia    ASSESSMENT/PLAN:    Open right inguinal hernia repair today    Bill Ca M.D.

## 2018-08-17 NOTE — DISCHARGE INSTRUCTIONS
Dr. Bill Ca  [address]  [place] [id number] [phone]    Discharge Instructions for Hernia Surgery      1. Go home, rest and take it easy today; however, you should get up and move about several times today to reduce the risk of developing a clot in your legs.      2. You may experience some dizziness or memory loss from the anesthesia.  This may last for the next 24 hours.  Someone should plan on staying with you for the first 24 hours for your safety.    3. Do not make any important legal decisions or sign any legal papers for the next 24 hours.      4. Eat and drink lightly today.  Start off with liquids, jello, soup, crackers or other bland foods at first. You may advance your diet tomorrow as tolerated as long as you do not experience any nausea or vomiting.     5. If skin glue (Dermabond) was used, your incisions are protected and covered.  The invisible glue will dissolve on its own as your incision heals. If dressings were used, you may remove your outer dressings in 3 days.  The white tapes called steri-strips should stay in place.  They will fall off on their own in 1-2 weeks.  Do not worry if they come off sooner.      6. If dressings were used, you may notice some bleeding/drainage on your outer dressings. A little bloody drainage is normal. If the bleeding/drainage is such that the bandage cannot absorb it, remove the dressing, apply clean gauze and apply firm pressure for a full 15 minutes.  If the bleeding continues, please call me.    7. You may shower tomorrow allowing water to run over the incisions; however, do not scrub the incisions.  No tub baths until your incisions are completely healed.      8. No lifting > 20 lbs. until you are seen at your follow-up visit.         9. You have received a prescription for a narcotic pain medicine, as you will have some pain following surgery.   You will not be totally pain free, but your pain medicine should make the pain  tolerable.  Please take your pain medicine as prescribed and always take your pills with food to prevent nausea. If you are having severe pain that cannot be controlled by the pain medicine, please contact me.      10. You have also received a prescription for an anti-nausea medicine.  Please take this as prescribed for any nausea or vomiting.  Nausea could be a result of the anesthesia or a result of the narcotic pain medicine.  If you experience severe nausea and vomiting that cannot be controlled by the nausea medicine, please call me.      11. If you had a laparoscopic surgery, it is not unusual to experience pain/discomfort in your shoulders or under your ribs after surgery.  It is from the gas used during the laparoscopic procedure and usually lasts 1-3 days.  The prescription pain medicine is used to treat the surgical pain and does not typically alleviate this “gassy” pain.     12. No driving for 24 hours and for as long as you are taking your prescription pain medicine.              13. You will need to call the office at [phone] to schedule a follow-up appointment in 6-10 days.     14. Remember to contact me for any of the following:    • Fever > 101 degrees  • Severe pain that cannot be controlled by taking your pain pills  • Severe nausea or vomiting that cannot be controlled by taking your nausea pills  • Significant bleeding of your incisions  • Drainage that has a bad smell or is yellow or green in appearance  • Any other questions or concerns        Additional Instruction for Inguinal Hernia Patients Only    1. If you did not urinate at the hospital after your surgery or if you feel the need to urinate and cannot, this will necessitate a return to the Emergency Room for placement of a urinary catheter.  You should also notify me as well.  As a rule, you should be able to empty your bladder within 4-6 hours after discharge from the hospital.      2. You may notice some scrotal bruising and/or  swelling. A scrotal support or briefs as well as ice packs may be used to alleviate discomfort.

## 2018-08-17 NOTE — PLAN OF CARE
Problem: Patient Care Overview  Goal: Plan of Care Review  Outcome: Ongoing (interventions implemented as appropriate)   08/17/18 5086   Coping/Psychosocial   Plan of Care Reviewed With patient   Plan of Care Review   Progress improving   OTHER   Outcome Summary pt brought from [facility] with uncontrolled pain and sleepiness. pt medicated for pain and nausea with mild relief. vss will continue to monitor.      Goal: Individualization and Mutuality  Outcome: Ongoing (interventions implemented as appropriate)    Goal: Interprofessional Rounds/Family Conf  Outcome: Ongoing (interventions implemented as appropriate)      Problem: Pain, Acute (Adult)  Goal: Identify Related Risk Factors and Signs and Symptoms  Outcome: Outcome(s) achieved Date Met: 08/17/18    Goal: Acceptable Pain Control/Comfort Level  Outcome: Ongoing (interventions implemented as appropriate)

## 2018-08-17 NOTE — ANESTHESIA PROCEDURE NOTES
Airway  Urgency: elective    Difficult airway    General Information and Staff    Patient location during procedure: OR  Anesthesiologist: IAM WILEY  CRNA: CHARANJIT PIKE    Indications and Patient Condition  Indications for airway management: airway protection    Preoxygenated: yes  Mask difficulty assessment: 3 - difficult mask (inadequate, unstable or two providers) +/- NMBA    Final Airway Details  Final airway type: endotracheal airway      Successful airway: ETT  Cuffed: yes   Successful intubation technique: direct laryngoscopy  Facilitating devices/methods: intubating stylet  Endotracheal tube insertion site: oral  Blade: CMAC  Blade size: #4  ETT size: 8.0 mm  Cormack-Lehane Classification: grade IIa - partial view of glottis  Placement verified by: chest auscultation and capnometry   Cuff volume (mL): 8  Measured from: lips  ETT to lips (cm): 22  Number of attempts at approach: 2    Additional Comments  SIVI.  EYES TAPED CLOSED PRIOR TO DL.  GRADE3 VIEW WITH MIL2. INTUBATION AS CHARTED ABOVE CMAC.  APPEARS ATRAUMATIC.  NO CHANGE TO DENTITION. +ETCO2. +BBS. +CR.

## 2018-08-18 VITALS
DIASTOLIC BLOOD PRESSURE: 63 MMHG | OXYGEN SATURATION: 96 % | WEIGHT: 277.78 LBS | HEIGHT: 73 IN | BODY MASS INDEX: 36.82 KG/M2 | RESPIRATION RATE: 16 BRPM | SYSTOLIC BLOOD PRESSURE: 93 MMHG | TEMPERATURE: 97.5 F | HEART RATE: 64 BPM

## 2018-08-18 LAB
GLUCOSE BLDC GLUCOMTR-MCNC: 186 MG/DL (ref 70–130)
GLUCOSE BLDC GLUCOMTR-MCNC: 224 MG/DL (ref 70–130)

## 2018-08-18 PROCEDURE — 25010000002 CEFAZOLIN PER 500 MG: Performed by: SURGERY

## 2018-08-18 PROCEDURE — 25010000002 ENOXAPARIN PER 10 MG: Performed by: SURGERY

## 2018-08-18 PROCEDURE — 99024 POSTOP FOLLOW-UP VISIT: CPT | Performed by: SURGERY

## 2018-08-18 PROCEDURE — G0378 HOSPITAL OBSERVATION PER HR: HCPCS

## 2018-08-18 PROCEDURE — 63710000001 INSULIN ASPART PER 5 UNITS: Performed by: SURGERY

## 2018-08-18 PROCEDURE — 82962 GLUCOSE BLOOD TEST: CPT

## 2018-08-18 RX ORDER — NICOTINE POLACRILEX 4 MG
15 LOZENGE BUCCAL
Status: DISCONTINUED | OUTPATIENT
Start: 2018-08-18 | End: 2018-08-18 | Stop reason: HOSPADM

## 2018-08-18 RX ORDER — DEXTROSE MONOHYDRATE 25 G/50ML
25 INJECTION, SOLUTION INTRAVENOUS
Status: DISCONTINUED | OUTPATIENT
Start: 2018-08-18 | End: 2018-08-18 | Stop reason: HOSPADM

## 2018-08-18 RX ADMIN — LEVOTHYROXINE SODIUM 125 MCG: 125 TABLET ORAL at 06:48

## 2018-08-18 RX ADMIN — INSULIN ASPART 2 UNITS: 100 INJECTION, SOLUTION INTRAVENOUS; SUBCUTANEOUS at 09:15

## 2018-08-18 RX ADMIN — SODIUM CHLORIDE 3 G: 9 INJECTION, SOLUTION INTRAVENOUS at 01:00

## 2018-08-18 RX ADMIN — ENOXAPARIN SODIUM 40 MG: 40 INJECTION SUBCUTANEOUS at 09:15

## 2018-08-18 RX ADMIN — PANTOPRAZOLE SODIUM 40 MG: 40 TABLET, DELAYED RELEASE ORAL at 09:15

## 2018-08-18 RX ADMIN — GLIPIZIDE 5 MG: 5 TABLET ORAL at 09:15

## 2018-08-18 RX ADMIN — OXYCODONE HYDROCHLORIDE AND ACETAMINOPHEN 2 TABLET: 5; 325 TABLET ORAL at 10:49

## 2018-08-18 RX ADMIN — OXYCODONE HYDROCHLORIDE AND ACETAMINOPHEN 2 TABLET: 5; 325 TABLET ORAL at 06:48

## 2018-08-18 RX ADMIN — INSULIN ASPART 4 UNITS: 100 INJECTION, SOLUTION INTRAVENOUS; SUBCUTANEOUS at 11:43

## 2018-08-18 NOTE — PROGRESS NOTES
"General Surgery  Progress Note    CC: Follow-up recurrent right inguinal hernia    POD#1 open right inguinal hernia repair for a recurrent indirect inguinal hernia    S: Feels great other than soreness in the right groin. The pain was fairly severe last night but has improved this morning. No nausea or vomiting, voiding well, tolerating diet.    O:BP 93/63 (BP Location: Right arm, Patient Position: Lying)   Pulse 64   Temp 97.5 °F (36.4 °C) (Oral)   Resp 16   Ht 185.4 cm (73\")   Wt 126 kg (277 lb 12.5 oz)   SpO2 96%   BMI 36.65 kg/m²       Intake & Output (last day)       08/17 0701 - 08/18 0700 08/18 0701 - 08/19 0700    P.O. 240     I.V. (mL/kg) 1927.5 (15.3)     Total Intake(mL/kg) 2167.5 (17.2)     Urine (mL/kg/hr) 0 (0)     Blood 15     Total Output 15      Net +2152.5            Unmeasured Urine Occurrence 3 x             GENERAL: alert, well appearing, and in no distress  HEENT: normocephalic, atraumatic, moist mucous membranes, clear sclera   CHEST: clear to auscultation, no wheezes, rales or rhonchi, symmetric air entry  CARDIAC: regular rate and rhythm    ABDOMEN: right groin incision healing well with no erythema or drainage  EXTREMITIES: no cyanosis, clubbing, or edema   SKIN: Warm and moist, no rashes      A/P: 52 y.o. male POD#1 open right inguinal hernia repair for a recurrent indirect inguinal hernia    Discharge home and follow-up with Dr. Ca in 1-2 weeks. No heavy lifting for 6 full weeks post-op.    Leslye Parra MD  General and Endoscopic Surgery  [facility]    [address], [address]  [place], [place], [address]  P: [phone]  F: [phone]     "

## 2018-08-18 NOTE — PLAN OF CARE
Problem: Patient Care Overview  Goal: Plan of Care Review  Outcome: Ongoing (interventions implemented as appropriate)   08/18/18 6425   Coping/Psychosocial   Plan of Care Reviewed With patient   OTHER   Outcome Summary Patient educated on scrotal swelling and taught to elevate and ice surgical area. awaiting d/c transport

## 2018-08-19 ENCOUNTER — HOSPITAL ENCOUNTER (EMERGENCY)
Facility: HOSPITAL | Age: 53
Discharge: HOME | End: 2018-08-20
Attending: EMERGENCY MEDICINE | Admitting: EMERGENCY MEDICINE

## 2018-08-19 ENCOUNTER — APPOINTMENT (OUTPATIENT)
Dept: CT IMAGING | Facility: HOSPITAL | Age: 53
End: 2018-08-19

## 2018-08-19 DIAGNOSIS — G89.18 POST-OPERATIVE PAIN: Primary | ICD-10-CM

## 2018-08-19 DIAGNOSIS — N50.89 SCROTAL SWELLING: ICD-10-CM

## 2018-08-19 LAB
ALBUMIN SERPL-MCNC: 3.8 G/DL (ref 3.5–5.2)
ALBUMIN/GLOB SERPL: 1.3 G/DL
ALP SERPL-CCNC: 87 U/L (ref 39–117)
ALT SERPL W P-5'-P-CCNC: 18 U/L (ref 1–41)
ANION GAP SERPL CALCULATED.3IONS-SCNC: 14.3 MMOL/L
AST SERPL-CCNC: 12 U/L (ref 1–40)
BASOPHILS # BLD AUTO: 0.02 10*3/MM3 (ref 0–0.2)
BASOPHILS NFR BLD AUTO: 0.2 % (ref 0–1.5)
BILIRUB SERPL-MCNC: 0.8 MG/DL (ref 0.1–1.2)
BUN BLD-MCNC: 7 MG/DL (ref 6–20)
BUN/CREAT SERPL: 8 (ref 7–25)
CALCIUM SPEC-SCNC: 8.9 MG/DL (ref 8.6–10.5)
CHLORIDE SERPL-SCNC: 99 MMOL/L (ref 98–107)
CO2 SERPL-SCNC: 26.7 MMOL/L (ref 22–29)
CREAT BLD-MCNC: 0.87 MG/DL (ref 0.76–1.27)
DEPRECATED RDW RBC AUTO: 47.7 FL (ref 37–54)
EOSINOPHIL # BLD AUTO: 0.3 10*3/MM3 (ref 0–0.7)
EOSINOPHIL NFR BLD AUTO: 3.6 % (ref 0.3–6.2)
ERYTHROCYTE [DISTWIDTH] IN BLOOD BY AUTOMATED COUNT: 13.4 % (ref 11.5–14.5)
GFR SERPL CREATININE-BSD FRML MDRD: 92 ML/MIN/1.73
GLOBULIN UR ELPH-MCNC: 3 GM/DL
GLUCOSE BLD-MCNC: 178 MG/DL (ref 65–99)
HCT VFR BLD AUTO: 46 % (ref 40.4–52.2)
HGB BLD-MCNC: 14.9 G/DL (ref 13.7–17.6)
IMM GRANULOCYTES # BLD: 0.05 10*3/MM3 (ref 0–0.03)
IMM GRANULOCYTES NFR BLD: 0.6 % (ref 0–0.5)
INR PPP: 1 (ref 0.9–1.1)
LYMPHOCYTES # BLD AUTO: 1.26 10*3/MM3 (ref 0.9–4.8)
LYMPHOCYTES NFR BLD AUTO: 15 % (ref 19.6–45.3)
MCH RBC QN AUTO: 31.4 PG (ref 27–32.7)
MCHC RBC AUTO-ENTMCNC: 32.4 G/DL (ref 32.6–36.4)
MCV RBC AUTO: 97 FL (ref 79.8–96.2)
MONOCYTES # BLD AUTO: 0.86 10*3/MM3 (ref 0.2–1.2)
MONOCYTES NFR BLD AUTO: 10.2 % (ref 5–12)
NEUTROPHILS # BLD AUTO: 5.91 10*3/MM3 (ref 1.9–8.1)
NEUTROPHILS NFR BLD AUTO: 70.4 % (ref 42.7–76)
PLATELET # BLD AUTO: 225 10*3/MM3 (ref 140–500)
PMV BLD AUTO: 10.3 FL (ref 6–12)
POTASSIUM BLD-SCNC: 4.3 MMOL/L (ref 3.5–5.2)
PROT SERPL-MCNC: 6.8 G/DL (ref 6–8.5)
PROTHROMBIN TIME: 13 SECONDS (ref 11.7–14.2)
RBC # BLD AUTO: 4.74 10*6/MM3 (ref 4.6–6)
SODIUM BLD-SCNC: 140 MMOL/L (ref 136–145)
WBC NRBC COR # BLD: 8.4 10*3/MM3 (ref 4.5–10.7)

## 2018-08-19 PROCEDURE — 80053 COMPREHEN METABOLIC PANEL: CPT | Performed by: EMERGENCY MEDICINE

## 2018-08-19 PROCEDURE — 85025 COMPLETE CBC W/AUTO DIFF WBC: CPT | Performed by: EMERGENCY MEDICINE

## 2018-08-19 PROCEDURE — 85610 PROTHROMBIN TIME: CPT | Performed by: EMERGENCY MEDICINE

## 2018-08-19 PROCEDURE — 96375 TX/PRO/DX INJ NEW DRUG ADDON: CPT

## 2018-08-19 PROCEDURE — 25010000002 HYDROMORPHONE PER 4 MG: Performed by: EMERGENCY MEDICINE

## 2018-08-19 PROCEDURE — 96374 THER/PROPH/DIAG INJ IV PUSH: CPT

## 2018-08-19 PROCEDURE — 85730 THROMBOPLASTIN TIME PARTIAL: CPT | Performed by: EMERGENCY MEDICINE

## 2018-08-19 PROCEDURE — 25010000002 ONDANSETRON PER 1 MG: Performed by: EMERGENCY MEDICINE

## 2018-08-19 PROCEDURE — 74176 CT ABD & PELVIS W/O CONTRAST: CPT

## 2018-08-19 PROCEDURE — 99283 EMERGENCY DEPT VISIT LOW MDM: CPT

## 2018-08-19 RX ORDER — SODIUM CHLORIDE 0.9 % (FLUSH) 0.9 %
10 SYRINGE (ML) INJECTION AS NEEDED
Status: DISCONTINUED | OUTPATIENT
Start: 2018-08-19 | End: 2018-08-20 | Stop reason: HOSPADM

## 2018-08-19 RX ORDER — ONDANSETRON 2 MG/ML
4 INJECTION INTRAMUSCULAR; INTRAVENOUS ONCE
Status: COMPLETED | OUTPATIENT
Start: 2018-08-19 | End: 2018-08-19

## 2018-08-19 RX ORDER — OXYCODONE HYDROCHLORIDE AND ACETAMINOPHEN 5; 325 MG/1; MG/1
2 TABLET ORAL ONCE
Status: DISCONTINUED | OUTPATIENT
Start: 2018-08-19 | End: 2018-08-19

## 2018-08-19 RX ADMIN — ONDANSETRON HYDROCHLORIDE 4 MG: 2 SOLUTION INTRAMUSCULAR; INTRAVENOUS at 22:29

## 2018-08-19 RX ADMIN — HYDROMORPHONE HYDROCHLORIDE 1 MG: 1 INJECTION, SOLUTION INTRAMUSCULAR; INTRAVENOUS; SUBCUTANEOUS at 22:29

## 2018-08-20 ENCOUNTER — TELEPHONE (OUTPATIENT)
Dept: SURGERY | Facility: CLINIC | Age: 53
End: 2018-08-20

## 2018-08-20 VITALS
BODY MASS INDEX: 38.3 KG/M2 | HEART RATE: 80 BPM | SYSTOLIC BLOOD PRESSURE: 123 MMHG | TEMPERATURE: 98.8 F | RESPIRATION RATE: 18 BRPM | OXYGEN SATURATION: 93 % | HEIGHT: 73 IN | WEIGHT: 289 LBS | DIASTOLIC BLOOD PRESSURE: 80 MMHG

## 2018-08-20 LAB — APTT PPP: 32.3 SECONDS (ref 22.7–35.4)

## 2018-08-20 NOTE — ED TRIAGE NOTES
Pt reports that he had an inguinal hernia repair on Friday morning by Md Harper. Pt reports that he notice swelling today in his right testicle, that is about the size of a grapefruit and purple. Pt reports that his penis is swollen as well. Pt reports that when he stood up it looked like the incision is opening and is oozing. Pt appears uncomfortable at this time. Pt reports that he hasn't had a BM since Thursday.

## 2018-08-20 NOTE — ED PROVIDER NOTES
EMERGENCY DEPARTMENT ENCOUNTER    CHIEF COMPLAINT  Chief Complaint: multiple complaints  History given by: patient  History limited by: nothing  Room Number: 31/31  PMD: Vanessa Jennings MD      HPI:  Pt is a 52 y.o. male who presents complaining of scrotal swelling, penile swelling, and testicular pain that began earlier today s/p a right inguinal hernia repair which was done by Dr. Ca, general surgery, two days ago. The patient also complains of difficulty urinating r/t penile swelling and chills. The patient denies cough, fever, or penile discharge. The patient reports that he has not had a BM for the past three days. The patient is currently on Percocet 325 mg every four hours but missed his dosage at 2000 today. There are no other complaints at this time.    Duration: began earlier today  Onset: gradual  Timing: constant  Location: scrotum, testicle, and penis  Quality: swelling and pain  Intensity/Severity: moderate  Progression: not specified  Associated Symptoms: difficulty urinating and chills  Aggravating Factors: the patient had a right inguinal hernia repair done by Dr. Ca two days ago.  Alleviating Factors: none specified  Previous Episodes: none specified  Treatment before arrival: The patient is currently on Percocet every four hours but missed his dosage at 2000 today.    PAST MEDICAL HISTORY  Active Ambulatory Problems     Diagnosis Date Noted   • Diabetes mellitus (CMS/Formerly [facility]) 05/31/2016   • Hypersomnia with sleep apnea    • ANNEL on CPAP    • Hypercholesterolemia 11/12/2012   • Primary hypothyroidism 12/07/2016   • Hyperlipidemia 12/07/2016   • Chronic pain 12/07/2016   • Low testosterone 12/19/2016   • Vitamin D deficiency 12/19/2016   • Hypogonadism in male 12/26/2016   • Chronic coronary artery disease 03/29/2017   • Obesity (BMI 30-39.9) 04/16/2018   • Benign essential HTN 04/16/2018   • Colitis 04/19/2018   • Rectal bleeding 04/24/2018   • Gastroesophageal reflux disease 04/24/2018   •  Hyperplastic colonic polyp 04/24/2018   • Peripheral polyneuropathy 05/18/2018   • Diarrhea 05/21/2018   • Unilateral recurrent inguinal hernia without obstruction or gangrene 07/25/2018     Resolved Ambulatory Problems     Diagnosis Date Noted   • No Resolved Ambulatory Problems     Past Medical History:   Diagnosis Date   • Cervical disc disease with myelopathy    • Colon polyps    • Coronary artery disease    • Cryptococcal pneumonitis (CMS/HCC) 2010   • Diabetes mellitus, type 2 (CMS/HCC)    • Diabetic peripheral neuropathy (CMS/HCC)    • Exposure to SARS-associated coronavirus 2016   • GERD (gastroesophageal reflux disease)    • History of chest pain 2015   • History of colitis 2018   • History of DVT (deep vein thrombosis) 2009   • Hyperlipidemia    • Hypothyroidism    • Inguinal hernia    • Iron deficiency anemia    • ANNEL on CPAP    • Pneumonia, bacterial 04/2018   • Pulmonary nodule    • Rectus diastasis    • Testosterone deficiency    • TIA (transient ischemic attack) 07/13/2013   • Vitamin D deficiency        PAST SURGICAL HISTORY  Past Surgical History:   Procedure Laterality Date   • ANTERIOR CERVICAL CORPECTOMY W/ FUSION  2006    C2-3   • ANTERIOR CERVICAL DISCECTOMY W/ FUSION  2012    C2-T2 PREVIOUS HARDWARE REMOVED AND REVISED   • CARDIAC CATHETERIZATION N/A 6/16/2016    Procedure: Coronary angiography;  Surgeon: Emiliano Gordon MD;  Location: [facility] CATH INVASIVE LOCATION;  Service:    • CARDIAC CATHETERIZATION N/A 6/16/2016    Procedure: Left Heart Cath;  Surgeon: Emiliano Gordon MD;  [facility];  Service:    • CARDIAC CATHETERIZATION N/A 6/16/2016    Procedure: Left ventriculography;  Surgeon: Emiliano Gordon MD;  Location: [facility] CATH INVASIVE LOCATION;  Service:    • CARDIAC CATHETERIZATION N/A 01/19/2015    Coronary angiography: Significant stenosis in the proximal LAD. Successful stenting of the proximal LAD stenosis w/ a 3.5 mm in diameter and  15mm in length drug-eluting stent was postdilated to a 4mm vessel-Dr. William Gordon   • CARPAL TUNNEL RELEASE Left 03/2017   • CARPAL TUNNEL RELEASE Right 03/2018   • CERVICAL LAMINECTOMY DECOMPRESSION POSTERIOR Right 10/21/2007    With discectomy C6-C7 performed using microsurgical technique-Dr. Dave Younger   • COLONOSCOPY N/A 03/29/2017    5 MM HYPERPLASTIC POLYP, RESCOPE IN 5 YRS, DR. DENNIS EARL AT [place]   • COLONOSCOPY N/A 5/8/2018    Procedure: COLONOSCOPY to ascending colon with biopsies;  Surgeon: Ray Frances MD;  Location: [facility] ENDOSCOPY;  Service: Gastroenterology   • COLONOSCOPY N/A 5/29/2018    Procedure: COLONOSCOPY to cecum;  Surgeon: Ray Frances MD;  Location: [facility] ENDOSCOPY;  Service: Gastroenterology   • CORONARY ANGIOPLASTY WITH STENT PLACEMENT  01/2015    LAD   • ENDOSCOPY N/A 5/8/2018    Procedure: ESOPHAGOGASTRODUODENOSCOPY with biopsies;  Surgeon: Ray Frances MD;  Location: [facility] ENDOSCOPY;  Service: Gastroenterology   • EPIDURAL BLOOD PATCH N/A 09/12/2008    Dr. Rigoberto Whitley   • FLEXIBLE BRONCHOSCOPY W/ BRONCHOPULMONARY LAVAGE N/A 09/08/2008    W/ Biopsy(benign, no neplasm, no granuloma)-Dr. Scott Sayied   • INGUINAL HERNIA REPAIR Right 1996   • KNEE ARTHROSCOPY     • ROTATOR CUFF REPAIR Left    • ROTATOR CUFF REPAIR Right    • TONSILLECTOMY     • UMBILICAL HERNIA REPAIR N/A 1996       FAMILY HISTORY  Family History   Problem Relation Age of Onset   • Heart disease Mother    • Hypertension Mother    • Hypertension Father    • Heart disease Father    • Cancer Brother    • Malig Hyperthermia Neg Hx        SOCIAL HISTORY  Social History     Social History   • Marital status:      Spouse name: N/A   • Number of children: N/A   • Years of education: N/A     Occupational History   • Not on file.     Social History Main Topics   • Smoking status: Never Smoker   • Smokeless tobacco: Never Used   • Alcohol use No   • Drug use: No   • Sexual activity: Defer     Other  Topics Concern   • Not on file     Social History Narrative   • No narrative on file       ALLERGIES  Adhesive tape    REVIEW OF SYSTEMS  Review of Systems   Constitutional: Positive for chills. Negative for activity change, appetite change and fever.   HENT: Negative for congestion and sore throat.    Eyes: Negative.    Respiratory: Negative for cough and shortness of breath.    Cardiovascular: Negative for chest pain and leg swelling.   Gastrointestinal: Negative for abdominal pain, diarrhea and vomiting.   Endocrine: Negative.    Genitourinary: Positive for difficulty urinating (r/t penile swelling), penile swelling, scrotal swelling and testicular pain. Negative for decreased urine volume, discharge and dysuria.   Musculoskeletal: Negative for neck pain.   Skin: Negative for rash and wound.   Allergic/Immunologic: Negative.    Neurological: Negative for weakness, numbness and headaches.   Hematological: Negative.    Psychiatric/Behavioral: Negative.    All other systems reviewed and are negative.      PHYSICAL EXAM  ED Triage Vitals   Temp Heart Rate Resp BP SpO2   08/19/18 2140 08/19/18 2140 08/19/18 2140 08/19/18 2145 08/19/18 2140   98.8 °F (37.1 °C) 96 20 149/91 98 %      Temp src Heart Rate Source Patient Position BP Location FiO2 (%)   -- 08/19/18 2140 -- -- --    Monitor          Physical Exam   Constitutional: He is oriented to person, place, and time. No distress.   HENT:   Head: Normocephalic and atraumatic.   Eyes: Pupils are equal, round, and reactive to light. EOM are normal.   Neck: Normal range of motion. Neck supple.   Cardiovascular: Normal rate, regular rhythm and normal heart sounds.  Exam reveals no gallop and no friction rub.    No murmur heard.  Pulmonary/Chest: Effort normal and breath sounds normal. No respiratory distress. He has no decreased breath sounds. He has no wheezes. He has no rhonchi. He has no rales. He exhibits no tenderness.   Abdominal: Soft. Bowel sounds are normal. He  exhibits no distension and no mass. There is no tenderness. There is no rebound and no guarding.   Genitourinary:   Genitourinary Comments: There is a 6 cm transverse incision overlying the right inguinal area and pelvis. It appears to be well approximated without discharge. There is no obvious dehiscence or surrounding erythema. There is tenderness to palpation. There is significant scrotal swelling.   Musculoskeletal: Normal range of motion. He exhibits no edema.   Neurological: He is alert and oriented to person, place, and time. He has normal sensation and normal strength.   Skin: Skin is warm and dry.   Psychiatric: Mood and affect normal.   Nursing note and vitals reviewed.      LAB RESULTS  Lab Results (last 24 hours)     Procedure Component Value Units Date/Time    CBC & Differential [[id number]] Collected:  08/19/18 2219    Specimen:  Blood Updated:  08/19/18 2230    Narrative:       The following orders were created for panel order CBC & Differential.  Procedure                               Abnormality         Status                     ---------                               -----------         ------                     CBC Auto Differential[[id number]]        Abnormal            Final result                 Please view results for these tests on the individual orders.    Comprehensive Metabolic Panel [[id number]]  (Abnormal) Collected:  08/19/18 2219    Specimen:  Blood Updated:  08/19/18 2252     Glucose 178 (H) mg/dL      BUN 7 mg/dL      Creatinine 0.87 mg/dL      Sodium 140 mmol/L      Potassium 4.3 mmol/L      Chloride 99 mmol/L      CO2 26.7 mmol/L      Calcium 8.9 mg/dL      Total Protein 6.8 g/dL      Albumin 3.80 g/dL      ALT (SGPT) 18 U/L      AST (SGOT) 12 U/L      Alkaline Phosphatase 87 U/L      Total Bilirubin 0.8 mg/dL      eGFR Non African Amer 92 mL/min/1.73      Globulin 3.0 gm/dL      A/G Ratio 1.3 g/dL      BUN/Creatinine Ratio 8.0     Anion Gap 14.3 mmol/L     Protime-INR [[id number]]   (Normal) Collected:  08/19/18 2219    Specimen:  Blood Updated:  08/19/18 2240     Protime 13.0 Seconds      INR 1.00    CBC Auto Differential [[id number]]  (Abnormal) Collected:  08/19/18 2219    Specimen:  Blood Updated:  08/19/18 2230     WBC 8.40 10*3/mm3      RBC 4.74 10*6/mm3      Hemoglobin 14.9 g/dL      Hematocrit 46.0 %      MCV 97.0 (H) fL      MCH 31.4 pg      MCHC 32.4 (L) g/dL      RDW 13.4 %      RDW-SD 47.7 fl      MPV 10.3 fL      Platelets 225 10*3/mm3      Neutrophil % 70.4 %      Lymphocyte % 15.0 (L) %      Monocyte % 10.2 %      Eosinophil % 3.6 %      Basophil % 0.2 %      Immature Grans % 0.6 (H) %      Neutrophils, Absolute 5.91 10*3/mm3      Lymphocytes, Absolute 1.26 10*3/mm3      Monocytes, Absolute 0.86 10*3/mm3      Eosinophils, Absolute 0.30 10*3/mm3      Basophils, Absolute 0.02 10*3/mm3      Immature Grans, Absolute 0.05 (H) 10*3/mm3     aPTT [[id number]]  (Normal) Collected:  08/19/18 2339    Specimen:  Blood Updated:  08/20/18 0003     PTT 32.3 seconds           I ordered the above labs and reviewed the results    RADIOLOGY  CT Abdomen Pelvis Without Contrast   Final Result   1.  Left lung base pulmonary nodules are stable but continued follow-up   recommended.   2. Stable left parapelvic cysts.   3. Areas of inflammatory change, air, and fluid along a site of recent   inguinal hernia repair surgery as discussed. A definite abscess not   appreciated.                   This study was performed with techniques to keep radiation doses as low   as reasonably achievable (ALARA). Individualized dose reduction   techniques using automated exposure control or adjustment of mA and/or   kV according to the patient size were employed.        This report was finalized on 8/19/2018 11:17 PM by Matt Bustos M.D.               I ordered the above noted radiological studies. Interpreted by radiologist. Reviewed by me in PACS.       PROCEDURES  Procedures      PROGRESS AND CONSULTS  4963 Ordered  CT Abd/Pelvis and blood work for further evaluation. Also ordered Dilaudid for pain and Zofran for nausea.    2354 Placed call to Surgery for consult.    0005 Received a call from Dr. Hayward, surgery, and discussed pt's case. Dr. Hayward agreed with plan for discharge and to have the patient f/u in the office.    0022 Rechecked the patient who is resting comfortably and in NAD. Discussed the unremarkable lab results and CT Abd/Pelvis. Also discussed the plan for discharge. Advised the patient to f/u with general surgery for further evaluation. Also advised the patient to return to the ED if he develops a fever or chills. Pt understands and agrees with the plan, all questions answered.    MEDICAL DECISION MAKING  Results were reviewed/discussed with the patient and they were also made aware of online access. Pt also made aware that some labs, such as cultures, will not be resulted during ER visit and follow up with PMD is necessary.     MDM  Number of Diagnoses or Management Options     Amount and/or Complexity of Data Reviewed  Clinical lab tests: ordered and reviewed (Unremarkable)  Tests in the radiology section of CPT®: ordered and reviewed (CT Abd/Pelvis shows left lung base pulmonary nodules are stable but continued follow-up recommended. Stable left parapelvic cysts. Areas of inflammatory change, air, and fluid along a site of recent inguinal hernia repair surgery as discussed. A definite abscess not appreciated.)  Decide to obtain previous medical records or to obtain history from someone other than the patient: yes  Review and summarize past medical records: yes (The patient was admitted on 08/17/18 for a large recurrent right inguinal hernia repair with large mesh plug which was done by Dr. Ca.)  Discuss the patient with other providers: yes (Dr. Hayward, surgery)  Independent visualization of images, tracings, or specimens: yes    Patient Progress  Patient progress: stable         DIAGNOSIS  Final  diagnoses:   Post-operative pain   Scrotal swelling       DISPOSITION  DISCHARGE    Patient discharged in stable condition.    Reviewed implications of results, diagnosis, meds, responsibility to follow up, warning signs and symptoms of possible worsening, potential complications and reasons to return to ER, including any new or worsening symptoms.    Patient/Family voiced understanding of above instructions.    Discussed plan for discharge, as there is no emergent indication for admission. Patient referred to primary care provider for BP management due to today's BP. Pt/family is agreeable and understands need for follow up and repeat testing.  Pt is aware that discharge does not mean that nothing is wrong but it indicates no emergency is present that requires admission and they must continue care with follow-up as given below or physician of their choice.     FOLLOW-UP  Bill Ca MD  [address] [id number] [phone]    Schedule an appointment as soon as possible for a visit   For further evaluation and treatment         Medication List      No changes were made to your prescriptions during this visit.           Latest Documented Vital Signs:  As of 5:31 AM  BP- 123/80 HR- 80 Temp- 98.8 °F (37.1 °C) O2 sat- 93%    --  Documentation assistance provided by melvin Pinedo for Manish Hernandez PA-C.  Information recorded by the melvin was done at my direction and has been verified and validated by me.       Becca Pinedo  08/20/18 0039       Kuldeep Hernandez III, PA  08/20/18 [id number]

## 2018-08-20 NOTE — ED PROVIDER NOTES
Pt presents to the ED c/o post operative pain and swelling that began yesterday at 16:00. Pt states that he had a right inguinal hernia repair performed 2 days ago. He also report a small area of dehiscence to his incision.  On exam, Pt is resting comfortably, in no distress, and without focal neurologic deficit. There is mild RLQ tenderness. Incision site is clean, dry, and intact without erythema or drainage. Mild scrotal edema with contusion/hematoma present. I agree with the plan for CT.     Attestation:  The JUDITH and I have discussed this patient's history, physical exam, and treatment plan.  I have reviewed the documentation and personally had a face to face interaction with the patient. I affirm the documentation and agree with the treatment and plan.  The attached note describes my personal findings.      Documentation assistance provided by melvin Martinez for Dr Phillips. Information recorded by the scribe was done at my direction and has been verified and validated by me.     Jie Martinez  08/19/18 [id number]       Joe Phillips MD  08/21/18 0016

## 2018-08-20 NOTE — DISCHARGE INSTRUCTIONS
Ice and lay supine to help with scrotal swelling.  Take your percocet as previously directed to help with pain.  Call and follow up with Dr Ca for further management.  Return to the ER if your condition should change, worsen, or should you develop a fever >100.4.

## 2018-08-20 NOTE — TELEPHONE ENCOUNTER
"Open ing hernia rpr 8/17- seen in ER for scrotal swelling & bruising  last night.  He has been reassured the swelling and bruising is normal and to apply ice and elevate the scrotum as much as possible.    He has also developed a blister about the size of a \"grape\" on his penis since seen in the ER.  Any recommendations on what he should do about that  "

## 2018-08-21 NOTE — TELEPHONE ENCOUNTER
Patient called c/o no BM since surgery on Friday 08/17. He spoke w/ Karina Joyner, Clinical coordinator yesterday and she instructed him to begin MOM. Patient has increased his water intake, added fiber supplement, stool softener and 2 doses of Milk of Magnesia. Patient did state that he is still taking his narcotic pain medication and is aware the constipation is a side effect of it, he only took 3 yesterday. I informed him that if he could decrease his pain meds and supplement with otc pain meds it would help him have a BM sooner. I instructed him that he can begin Magnesium Citrate and if he does not have a BM after this he will need to proceed to the ED for further evaluation. Patient verbalized understanding.

## 2018-08-23 ENCOUNTER — OFFICE VISIT (OUTPATIENT)
Dept: SURGERY | Facility: CLINIC | Age: 53
End: 2018-08-23

## 2018-08-23 DIAGNOSIS — Z09 FOLLOW UP: Primary | ICD-10-CM

## 2018-08-23 PROCEDURE — 99024 POSTOP FOLLOW-UP VISIT: CPT | Performed by: SURGERY

## 2018-08-23 NOTE — PROGRESS NOTES
Postoperative visit    Open recurrent right inguinal hernia repair with large mesh plug 8/17/2018    Office visit: And did up in emergency room several days ago due to swelling but today on examination the incision is completely closed and healing well, swelling is actually quite minimal given the magnitude of the hernia.  I reviewed the labs and CT scan as well as images from the CT scan from his emergency room visit and see no concerning findings.  We went over activity instructions and he'll follow-up with me as needed.

## 2018-09-11 DIAGNOSIS — Z79.4 TYPE 2 DIABETES MELLITUS WITH OTHER SPECIFIED COMPLICATION, WITH LONG-TERM CURRENT USE OF INSULIN (HCC): ICD-10-CM

## 2018-09-11 DIAGNOSIS — E29.1 HYPOGONADISM IN MALE: ICD-10-CM

## 2018-09-11 DIAGNOSIS — E03.9 PRIMARY HYPOTHYROIDISM: ICD-10-CM

## 2018-09-11 DIAGNOSIS — E11.69 TYPE 2 DIABETES MELLITUS WITH OTHER SPECIFIED COMPLICATION, WITH LONG-TERM CURRENT USE OF INSULIN (HCC): ICD-10-CM

## 2018-09-11 DIAGNOSIS — E55.9 VITAMIN D DEFICIENCY: Primary | ICD-10-CM

## 2018-10-02 ENCOUNTER — OFFICE VISIT (OUTPATIENT)
Dept: ENDOCRINOLOGY | Age: 53
End: 2018-10-02

## 2018-10-02 VITALS
BODY MASS INDEX: 37.24 KG/M2 | SYSTOLIC BLOOD PRESSURE: 108 MMHG | HEIGHT: 73 IN | WEIGHT: 281 LBS | DIASTOLIC BLOOD PRESSURE: 86 MMHG

## 2018-10-02 DIAGNOSIS — E29.1 HYPOGONADISM IN MALE: ICD-10-CM

## 2018-10-02 DIAGNOSIS — E03.9 PRIMARY HYPOTHYROIDISM: ICD-10-CM

## 2018-10-02 DIAGNOSIS — I10 BENIGN ESSENTIAL HTN: ICD-10-CM

## 2018-10-02 DIAGNOSIS — E11.69 TYPE 2 DIABETES MELLITUS WITH OTHER SPECIFIED COMPLICATION, WITH LONG-TERM CURRENT USE OF INSULIN (HCC): Primary | ICD-10-CM

## 2018-10-02 DIAGNOSIS — E78.00 HYPERCHOLESTEROLEMIA: ICD-10-CM

## 2018-10-02 DIAGNOSIS — E55.9 VITAMIN D DEFICIENCY: ICD-10-CM

## 2018-10-02 DIAGNOSIS — Z79.4 TYPE 2 DIABETES MELLITUS WITH OTHER SPECIFIED COMPLICATION, WITH LONG-TERM CURRENT USE OF INSULIN (HCC): Primary | ICD-10-CM

## 2018-10-02 PROCEDURE — 99214 OFFICE O/P EST MOD 30 MIN: CPT | Performed by: NURSE PRACTITIONER

## 2018-10-02 RX ORDER — LANCETS
EACH MISCELLANEOUS
COMMUNITY

## 2018-10-02 NOTE — PROGRESS NOTES
"Subjective   Chetan Grimm is a 52 y.o. male is here today for follow-up.  Chief Complaint   Patient presents with   • Diabetes     No recent labs, pt has not been testing BG due to losing meter, pt given a new one.    • Hyperlipidemia     pt is still having trouble with insurance and testosterone. Passport saying that 2 pumps on each shoulder daily is considered an overdose.    • Hypertension   • Vitamin D Deficiency   • Hypogonadism     /86   Ht 185.4 cm (73\")   Wt 127 kg (281 lb)   BMI 37.07 kg/m²   Current Outpatient Prescriptions on File Prior to Visit   Medication Sig   • Albiglutide (TANZEUM) 50 MG pen-injector Inject 50 mg under the skin into the appropriate area as directed 1 (One) Time Per Week. TUESDAYS   • ascorbic acid (VITAMIN C) 1000 MG tablet Take 2,000 mg by mouth 2 (Two) Times a Day.   • aspirin 81 MG chewable tablet Chew 81 mg Daily. TO HOLD 3 DAYS PRIOR TO OR   • atorvastatin (LIPITOR) 40 MG tablet Take 40 mg by mouth Every Night.   • Cholecalciferol (VITAMIN D3) 5000 units capsule capsule Take 5,000 Units by mouth Daily.   • Cyanocobalamin (VITAMIN B-12 ER PO) Take 2,500 mcg by mouth Daily.   • cyclobenzaprine (FLEXERIL) 10 MG tablet Take 10 mg by mouth 2 (two) times a day as needed.   • dapagliflozin-metformin HCl ER (XIGDUO XR) 5-1000 MG tablet Take 1 tablet by mouth 2 (Two) Times a Day.   • gabapentin (NEURONTIN) 100 MG capsule TAKE 1 CAPSULE BY MOUTH THREE TIMES DAILY   • glipiZIDE (GLUCOTROL) 5 MG tablet Take 5 mg by mouth 2 (Two) Times a Day Before Meals.   • glucagon (GLUCAGON EMERGENCY) 1 MG injection Inject 1 mg under the skin 1 (One) Time As Needed for low blood sugar for up to 1 dose.   • insulin degludec (TRESIBA FLEXTOUCH) 100 UNIT/ML solution pen-injector injection Inject 46 Units under the skin into the appropriate area as directed Every Night.   • IRON PO Take 65 mg by mouth Daily.   • levothyroxine (SYNTHROID) 125 MCG tablet Take 1 tablet by mouth Daily.   • " lisinopril (PRINIVIL,ZESTRIL) 5 MG tablet Take 5 mg by mouth every night.   • Omega-3 Fatty Acids (FISH OIL) 1000 MG capsule capsule Take 1,000 mg by mouth Daily With Breakfast. HELD FOR OR   • pantoprazole (PROTONIX) 40 MG EC tablet Take 40 mg by mouth 2 (Two) Times a Day.   • Sennosides-Docusate Sodium (EQ STOOL SOFTENER/LAXATIVE PO) Take 100 mg by mouth 2 (Two) Times a Day.   • [DISCONTINUED] glucose blood (FREESTYLE LITE) test strip Use to test 4 times daily   • [DISCONTINUED] ondansetron (ZOFRAN) 4 MG tablet Take 1 tablet by mouth Every 6 (Six) Hours As Needed for Nausea or Vomiting for up to 10 doses.   • [DISCONTINUED] oxyCODONE-acetaminophen (ROXICET) 5-325 MG per tablet Take 1-2 tablets by mouth Every 4 (Four) Hours As Needed for pain.     Current Facility-Administered Medications on File Prior to Visit   Medication   • [DISCONTINUED] nitroglycerin (NITROSTAT) SL tablet 0.4 mg     Family History   Problem Relation Age of Onset   • Heart disease Mother    • Hypertension Mother    • Hypertension Father    • Heart disease Father    • Cancer Brother    • Malig Hyperthermia Neg Hx      Social History   Substance Use Topics   • Smoking status: Never Smoker   • Smokeless tobacco: Never Used   • Alcohol use No     Allergies   Allergen Reactions   • Adhesive Tape Hives and Other (See Comments)     SKIN BLISTERS         History of Present Illness   Encounter Diagnoses   Name Primary?   • Benign essential HTN    • Hypercholesterolemia    • Vitamin D deficiency    • Hypogonadism in male    • Primary hypothyroidism    • Type 2 diabetes mellitus with other specified complication, with long-term current use of insulin (CMS/[facility]) Yes     This is a 52-year-old male patient here today for routine follow-up visit for the above-mentioned problems.  Jere was reviewed at today's visit.  He has no recent labs will have labs done today.  His last hemoglobin A1c reflects his diabetes is not under good control.  He's been having  problems getting his testosterone therapy from his pharmacy due to perception the patient is being given a prescription for too much testosterone.  According to the drug information patient is prescribed maximum dose of testosterone therapy.  He is a history of having a stent in his LAD.  He has been having complaints of chest pain for approximately one month is going to see cardiologist tomorrow.  He states he has not been feeling well recently.  He is not checking his blood sugars due to having O meter.  He was given a new blood glucose meter at today's visit.  He states chronically tired and is sleeping 12 hours daily.  He is on a CPAP and has an appointment to follow-up with sleep medicine at the end of October.  He recently had a inguinal hernia repair on the right.      The following portions of the patient's history were reviewed and updated as appropriate: allergies, current medications, past family history, past medical history, past social history, past surgical history and problem list.    Review of Systems   Constitutional: Negative for fatigue.   HENT: Negative for trouble swallowing.    Eyes: Negative for visual disturbance.   Respiratory: Negative for shortness of breath.    Cardiovascular: Negative for leg swelling.   Endocrine: Negative for polyuria.   Skin: Negative for wound.   Neurological: Negative for numbness.       Objective   Physical Exam   Constitutional: He is oriented to person, place, and time. He appears well-developed and well-nourished. No distress.   HENT:   Head: Normocephalic and atraumatic.   Eyes: Pupils are equal, round, and reactive to light.   Neck: Normal range of motion. Neck supple. Carotid bruit is not present. No tracheal deviation, no edema and no erythema present. No thyromegaly present.   Cardiovascular: Normal rate, regular rhythm, normal heart sounds and intact distal pulses.  Exam reveals no gallop and no friction rub.    No murmur heard.  Pulmonary/Chest: Effort  normal and breath sounds normal. No respiratory distress. He has no wheezes. He has no rales.   Musculoskeletal: Normal range of motion. He exhibits no edema or deformity.   Lymphadenopathy:     He has no cervical adenopathy.   Neurological: He is alert and oriented to person, place, and time. Coordination normal.   Skin: Skin is warm and dry. No rash noted. He is not diaphoretic. No erythema. No pallor.   Psychiatric: He has a normal mood and affect. His behavior is normal. Judgment and thought content normal.   Nursing note and vitals reviewed.    Lab Results   Component Value Date    HGBA1C 7.90 (H) 04/20/2018     Lab Results   Component Value Date    GLUCOSE 178 (H) 08/19/2018    BUN 7 08/19/2018    CREATININE 0.87 08/19/2018    EGFRIFNONA 92 08/19/2018    EGFRIFAFRI 101 04/24/2018    BCR 8.0 08/19/2018    K 4.3 08/19/2018    CO2 26.7 08/19/2018    CALCIUM 8.9 08/19/2018    PROTENTOTREF 6.6 04/24/2018    ALBUMIN 3.80 08/19/2018    LABIL2 1.5 04/24/2018    AST 12 08/19/2018    ALT 18 08/19/2018     Lab Results   Component Value Date    CHOL 131 04/20/2018    CHLPL 180 04/17/2018    TRIG 136 04/20/2018    HDL 26 (L) 04/20/2018    LDL 78 04/20/2018     Lab Results   Component Value Date    TSH 4.020 04/20/2018    O6NJIUT 6.7 04/17/2018       Assessment/Plan   Problems Addressed this Visit        Cardiovascular and Mediastinum    Hypercholesterolemia    Relevant Medications    glucose blood (ACCU-CHEK GUIDE) test strip    ACCU-CHEK FASTCLIX LANCETS misc    Other Relevant Orders    Comprehensive Metabolic Panel    C-Peptide    Hemoglobin A1c    Lipid Panel    MicroAlbumin, Urine, Random - Urine, Clean Catch    T3, Free    T4, Free    Thyroid Panel With TSH    Vitamin D 25 Hydroxy    Comprehensive Thyroglobulin    Benign essential HTN    Relevant Medications    glucose blood (ACCU-CHEK GUIDE) test strip    ACCU-CHEK FASTCLIX LANCETS misc    Other Relevant Orders    Comprehensive Metabolic Panel    C-Peptide     Hemoglobin A1c    Lipid Panel    MicroAlbumin, Urine, Random - Urine, Clean Catch    T3, Free    T4, Free    Thyroid Panel With TSH    Vitamin D 25 Hydroxy    Comprehensive Thyroglobulin       Digestive    Vitamin D deficiency    Relevant Medications    glucose blood (ACCU-CHEK GUIDE) test strip    ACCU-CHEK FASTCLIX LANCETS misc    Other Relevant Orders    Comprehensive Metabolic Panel    C-Peptide    Hemoglobin A1c    Lipid Panel    MicroAlbumin, Urine, Random - Urine, Clean Catch    T3, Free    T4, Free    Thyroid Panel With TSH    Vitamin D 25 Hydroxy    Comprehensive Thyroglobulin       Endocrine    Diabetes mellitus (CMS/HCC) - Primary    Relevant Medications    glucose blood (ACCU-CHEK GUIDE) test strip    ACCU-CHEK FASTCLIX LANCETS misc    Other Relevant Orders    Comprehensive Metabolic Panel    C-Peptide    Hemoglobin A1c    Lipid Panel    MicroAlbumin, Urine, Random - Urine, Clean Catch    T3, Free    T4, Free    Thyroid Panel With TSH    Vitamin D 25 Hydroxy    Comprehensive Thyroglobulin    Primary hypothyroidism    Relevant Medications    glucose blood (ACCU-CHEK GUIDE) test strip    ACCU-CHEK FASTCLIX LANCETS misc    Other Relevant Orders    Comprehensive Metabolic Panel    C-Peptide    Hemoglobin A1c    Lipid Panel    MicroAlbumin, Urine, Random - Urine, Clean Catch    T3, Free    T4, Free    Thyroid Panel With TSH    Vitamin D 25 Hydroxy    Comprehensive Thyroglobulin    Hypogonadism in male    Relevant Medications    glucose blood (ACCU-CHEK GUIDE) test strip    ACCU-CHEK FASTCLIX LANCETS misc    Other Relevant Orders    Comprehensive Metabolic Panel    C-Peptide    Hemoglobin A1c    Lipid Panel    MicroAlbumin, Urine, Random - Urine, Clean Catch    T3, Free    T4, Free    Thyroid Panel With TSH    Vitamin D 25 Hydroxy    Comprehensive Thyroglobulin          In summary, patient was seen and examined.  Metabolically he is stable.  He will have extensive labs done at today's visit will be notified of  the results along with any further recommendations.  He will follow-up in 6 months with labs.  He was given a new prescription for Axiron testosterone therapy due to BE filled in his pharmacy.  The dose was confirmed it is an adequate dose and within prescribing indications.  His blood pressure is an satisfactory range.  He has plans to follow-up with cardiology tomorrow for his complaints of chest pain.  He's been advised that he needs to go to the emergency room for any unresolved chest pain she continue or worsen.  He denies any diaphoresis or shortness of breath with his current chest pain.  He also reports no recent injury to his chest or muscles.

## 2018-10-03 ENCOUNTER — APPOINTMENT (OUTPATIENT)
Dept: CARDIOLOGY | Facility: HOSPITAL | Age: 53
End: 2018-10-03

## 2018-10-03 ENCOUNTER — HOSPITAL ENCOUNTER (OUTPATIENT)
Facility: HOSPITAL | Age: 53
Setting detail: OBSERVATION
Discharge: HOME | End: 2018-10-04
Attending: INTERNAL MEDICINE | Admitting: INTERNAL MEDICINE

## 2018-10-03 ENCOUNTER — OFFICE VISIT (OUTPATIENT)
Dept: CARDIOLOGY | Facility: CLINIC | Age: 53
End: 2018-10-03

## 2018-10-03 VITALS
DIASTOLIC BLOOD PRESSURE: 79 MMHG | HEART RATE: 76 BPM | WEIGHT: 281 LBS | SYSTOLIC BLOOD PRESSURE: 119 MMHG | BODY MASS INDEX: 37.24 KG/M2 | HEIGHT: 73 IN

## 2018-10-03 DIAGNOSIS — E78.00 HYPERCHOLESTEROLEMIA: ICD-10-CM

## 2018-10-03 DIAGNOSIS — E66.9 OBESITY (BMI 30-39.9): ICD-10-CM

## 2018-10-03 DIAGNOSIS — I20.0 UNSTABLE ANGINA (HCC): Primary | ICD-10-CM

## 2018-10-03 DIAGNOSIS — I20.0 UNSTABLE ANGINA (HCC): ICD-10-CM

## 2018-10-03 DIAGNOSIS — Z95.5 STATUS POST INSERTION OF DRUG ELUTING CORONARY ARTERY STENT: ICD-10-CM

## 2018-10-03 DIAGNOSIS — I25.110 CORONARY ARTERY DISEASE INVOLVING NATIVE HEART WITH UNSTABLE ANGINA PECTORIS, UNSPECIFIED VESSEL OR LESION TYPE (HCC): ICD-10-CM

## 2018-10-03 DIAGNOSIS — E78.5 HYPERLIPIDEMIA, UNSPECIFIED HYPERLIPIDEMIA TYPE: ICD-10-CM

## 2018-10-03 LAB
GLUCOSE BLDC GLUCOMTR-MCNC: 136 MG/DL (ref 70–130)
GLUCOSE BLDC GLUCOMTR-MCNC: 205 MG/DL (ref 70–130)
TROPONIN T SERPL-MCNC: <0.01 NG/ML (ref 0–0.03)
TROPONIN T SERPL-MCNC: <0.01 NG/ML (ref 0–0.03)

## 2018-10-03 PROCEDURE — G0378 HOSPITAL OBSERVATION PER HR: HCPCS

## 2018-10-03 PROCEDURE — 84484 ASSAY OF TROPONIN QUANT: CPT | Performed by: NURSE PRACTITIONER

## 2018-10-03 PROCEDURE — 99214 OFFICE O/P EST MOD 30 MIN: CPT | Performed by: INTERNAL MEDICINE

## 2018-10-03 PROCEDURE — 96372 THER/PROPH/DIAG INJ SC/IM: CPT

## 2018-10-03 PROCEDURE — 93306 TTE W/DOPPLER COMPLETE: CPT

## 2018-10-03 PROCEDURE — 63710000001 INSULIN ASPART PER 5 UNITS: Performed by: HOSPITALIST

## 2018-10-03 PROCEDURE — 93000 ELECTROCARDIOGRAM COMPLETE: CPT | Performed by: INTERNAL MEDICINE

## 2018-10-03 PROCEDURE — 25010000002 ENOXAPARIN PER 10 MG: Performed by: NURSE PRACTITIONER

## 2018-10-03 PROCEDURE — 93306 TTE W/DOPPLER COMPLETE: CPT | Performed by: INTERNAL MEDICINE

## 2018-10-03 PROCEDURE — 82962 GLUCOSE BLOOD TEST: CPT

## 2018-10-03 RX ORDER — ASPIRIN 81 MG/1
81 TABLET, CHEWABLE ORAL ONCE
Status: CANCELLED | OUTPATIENT
Start: 2018-10-03 | End: 2018-10-03

## 2018-10-03 RX ORDER — ASPIRIN 81 MG/1
81 TABLET ORAL DAILY
Status: CANCELLED | OUTPATIENT
Start: 2018-10-04

## 2018-10-03 RX ORDER — ASPIRIN 81 MG/1
81 TABLET ORAL DAILY
Status: DISCONTINUED | OUTPATIENT
Start: 2018-10-04 | End: 2018-10-04 | Stop reason: HOSPADM

## 2018-10-03 RX ORDER — NITROGLYCERIN 0.4 MG/1
TABLET SUBLINGUAL
Qty: 100 TABLET | Refills: 11 | Status: SHIPPED | OUTPATIENT
Start: 2018-10-03

## 2018-10-03 RX ORDER — ATORVASTATIN CALCIUM 20 MG/1
40 TABLET, FILM COATED ORAL NIGHTLY
Status: DISCONTINUED | OUTPATIENT
Start: 2018-10-03 | End: 2018-10-04 | Stop reason: HOSPADM

## 2018-10-03 RX ORDER — ASPIRIN 81 MG/1
81 TABLET, CHEWABLE ORAL ONCE
Status: COMPLETED | OUTPATIENT
Start: 2018-10-03 | End: 2018-10-03

## 2018-10-03 RX ORDER — LISINOPRIL 5 MG/1
5 TABLET ORAL NIGHTLY
Status: DISCONTINUED | OUTPATIENT
Start: 2018-10-03 | End: 2018-10-04 | Stop reason: HOSPADM

## 2018-10-03 RX ORDER — NITROGLYCERIN 0.4 MG/1
0.4 TABLET SUBLINGUAL
Status: DISCONTINUED | OUTPATIENT
Start: 2018-10-03 | End: 2018-10-04 | Stop reason: HOSPADM

## 2018-10-03 RX ORDER — PANTOPRAZOLE SODIUM 40 MG/1
40 TABLET, DELAYED RELEASE ORAL
Status: DISCONTINUED | OUTPATIENT
Start: 2018-10-04 | End: 2018-10-04 | Stop reason: HOSPADM

## 2018-10-03 RX ORDER — LEVOTHYROXINE SODIUM 0.12 MG/1
125 TABLET ORAL EVERY MORNING
Status: DISCONTINUED | OUTPATIENT
Start: 2018-10-04 | End: 2018-10-04 | Stop reason: HOSPADM

## 2018-10-03 RX ORDER — ACETAMINOPHEN 325 MG/1
650 TABLET ORAL EVERY 6 HOURS PRN
Status: DISCONTINUED | OUTPATIENT
Start: 2018-10-03 | End: 2018-10-04 | Stop reason: HOSPADM

## 2018-10-03 RX ADMIN — ACETAMINOPHEN 650 MG: 325 TABLET, FILM COATED ORAL at 14:19

## 2018-10-03 RX ADMIN — LISINOPRIL 5 MG: 5 TABLET ORAL at 20:33

## 2018-10-03 RX ADMIN — ATORVASTATIN CALCIUM 40 MG: 20 TABLET, FILM COATED ORAL at 20:33

## 2018-10-03 RX ADMIN — INSULIN ASPART 3 UNITS: 100 INJECTION, SOLUTION INTRAVENOUS; SUBCUTANEOUS at 20:43

## 2018-10-03 RX ADMIN — ENOXAPARIN SODIUM 40 MG: 40 INJECTION SUBCUTANEOUS at 14:24

## 2018-10-03 RX ADMIN — NITROGLYCERIN 1 INCH: 20 OINTMENT TOPICAL at 14:24

## 2018-10-03 RX ADMIN — ASPIRIN 81 MG: 81 TABLET, CHEWABLE ORAL at 14:23

## 2018-10-03 NOTE — PROGRESS NOTES
Subjective:        Chetan Grimm is a 52 y.o. male who here for follow up    CC  Cp, radiate to back and elbow, anytimes, several times, sob on lt side, 5-10 min, very tired, fatigue  HPI  52-year-old male with known history of coronary artery disease, hyperlipidemia and hypercholesterolemia with morbid obesity here for the cardiac evaluation as the patient has been complaining of the chest pains retrosternal mild-to-moderate radiating to the back as well as the left upper arm associated with weakness and shortness of breath gradually worsening over the several weeks associated with extreme fatigue, also has significant cardiac risk factors with diabetes obesity,      Problem List Items Addressed This Visit        Cardiovascular and Mediastinum    Hypercholesterolemia    Hyperlipidemia    Coronary artery disease involving native heart with unstable angina pectoris (CMS/HCC)    Relevant Medications    nitroglycerin (NITROSTAT) 0.4 MG SL tablet    Unstable angina (CMS/HCC) - Primary    Relevant Medications    nitroglycerin (NITROSTAT) 0.4 MG SL tablet       Digestive    Obesity (BMI 30-39.9)       Other    Status post insertion of drug eluting coronary artery stent        .    The following portions of the patient's history were reviewed and updated as appropriate: allergies, current medications, past family history, past medical history, past social history, past surgical history and problem list.    Past Medical History:   Diagnosis Date   • Cervical disc disease with myelopathy    • Colon polyps    • Coronary artery disease    • Cryptococcal pneumonitis (CMS/HCC) 2010    TREATED WITH AMPHOTERACIN B   • Diabetes mellitus, type 2 (CMS/HCC)     TYPE 2   • Diabetic peripheral neuropathy (CMS/HCC)    • Exposure to SARS-associated coronavirus 2016   • GERD (gastroesophageal reflux disease)    • History of chest pain 2015   • History of colitis 2018   • History of DVT (deep vein thrombosis) 2009    RIGHT ARM    •  "Hyperlipidemia    • Hypothyroidism    • Inguinal hernia    • Iron deficiency anemia    • ANNEL on CPAP     WEAR CPAP   • Pneumonia, bacterial 04/2018   • Pulmonary nodule    • Rectus diastasis    • Testosterone deficiency    • TIA (transient ischemic attack) 07/13/2013    NO RESIDUAL   • Vitamin D deficiency      reports that he has never smoked. He has never used smokeless tobacco. He reports that he does not drink alcohol or use drugs.   Family History   Problem Relation Age of Onset   • Heart disease Mother    • Hypertension Mother    • Hypertension Father    • Heart disease Father    • Cancer Brother    • Malig Hyperthermia Neg Hx        Review of Systems  Constitutional: No wt loss, fever, fatigue  Gastrointestinal: No nausea, abdominal pain  Behavioral/Psych: No insomnia or anxiety   Cardiovascular chest pains and tightness in chest  Objective:                 Physical Exam  /79 (BP Location: Left arm, Patient Position: Sitting)   Pulse 76   Ht 185.4 cm (73\")   Wt 127 kg (281 lb)   BMI 37.07 kg/m²     General appearance: NAD, conversant , morbidly obesity   Eyes: anicteric sclerae, moist conjunctivae; no lid-lag; PERRLA   HENT: Atraumatic; oropharynx clear with moist mucous membranes and no mucosal ulcerations;  normal hard and soft palate   Neck: Trachea midline; FROM, supple, no thyromegaly or lymphadenopathy   Lungs: CTA, with normal respiratory effort and no intercostal retractions   CV: S1-S2 regular, no murmurs, no rub, no gallop   Abdomen: Soft, non-tender; no masses or HSM   Extremities: No peripheral edema or extremity lymphadenopathy  Skin: Normal temperature, turgor and texture; no rash, ulcers or subcutaneous nodules   Psych: Appropriate affect, alert and oriented to person, place and time           Cardiographics  @  ECG 12 Lead  Date/Time: 10/3/2018 10:41 AM  Performed by: FELIX RINCON  Authorized by: FELIX RINCON   Comparison: compared with previous ECG   Similar to " previous ECG  Rhythm: sinus rhythm  Conduction: right bundle branch block  Clinical impression: abnormal ECG            Echocardiogram:        Current Outpatient Prescriptions:   •  ACCU-CHEK FASTCLIX LANCETS misc, Use to test BG 2x daily, Disp: , Rfl:   •  Albiglutide (TANZEUM) 50 MG pen-injector, Inject 50 mg under the skin into the appropriate area as directed 1 (One) Time Per Week. TUESDAYS, Disp: , Rfl:   •  ascorbic acid (VITAMIN C) 1000 MG tablet, Take 2,000 mg by mouth 2 (Two) Times a Day., Disp: , Rfl:   •  aspirin 81 MG chewable tablet, Chew 81 mg Daily. TO HOLD 3 DAYS PRIOR TO OR, Disp: , Rfl:   •  atorvastatin (LIPITOR) 40 MG tablet, Take 40 mg by mouth Every Night., Disp: , Rfl:   •  Cholecalciferol (VITAMIN D3) 5000 units capsule capsule, Take 5,000 Units by mouth Daily., Disp: , Rfl:   •  Cyanocobalamin (VITAMIN B-12 ER PO), Take 2,500 mcg by mouth Daily., Disp: , Rfl:   •  cyclobenzaprine (FLEXERIL) 10 MG tablet, Take 10 mg by mouth 2 (two) times a day as needed., Disp: , Rfl:   •  dapagliflozin-metformin HCl ER (XIGDUO XR) 5-1000 MG tablet, Take 1 tablet by mouth 2 (Two) Times a Day., Disp: , Rfl:   •  gabapentin (NEURONTIN) 100 MG capsule, TAKE 1 CAPSULE BY MOUTH THREE TIMES DAILY, Disp: 90 capsule, Rfl: 1  •  glipiZIDE (GLUCOTROL) 5 MG tablet, Take 5 mg by mouth 2 (Two) Times a Day Before Meals., Disp: , Rfl:   •  glucagon (GLUCAGON EMERGENCY) 1 MG injection, Inject 1 mg under the skin 1 (One) Time As Needed for low blood sugar for up to 1 dose., Disp: 1 kit, Rfl: 5  •  glucose blood (ACCU-CHEK GUIDE) test strip, Use to test BG 2x daily, Disp: , Rfl:   •  insulin degludec (TRESIBA FLEXTOUCH) 100 UNIT/ML solution pen-injector injection, Inject 46 Units under the skin into the appropriate area as directed Every Night., Disp: , Rfl:   •  IRON PO, Take 65 mg by mouth Daily., Disp: , Rfl:   •  levothyroxine (SYNTHROID) 125 MCG tablet, Take 1 tablet by mouth Daily., Disp: 30 tablet, Rfl: 11  •   lisinopril (PRINIVIL,ZESTRIL) 5 MG tablet, Take 5 mg by mouth every night., Disp: , Rfl:   •  Omega-3 Fatty Acids (FISH OIL) 1000 MG capsule capsule, Take 1,000 mg by mouth Daily With Breakfast. HELD FOR OR, Disp: , Rfl:   •  pantoprazole (PROTONIX) 40 MG EC tablet, Take 40 mg by mouth 2 (Two) Times a Day., Disp: , Rfl:   •  Sennosides-Docusate Sodium (EQ STOOL SOFTENER/LAXATIVE PO), Take 100 mg by mouth 2 (Two) Times a Day., Disp: , Rfl:   •  AXIRON 30 MG/ACT solution, 2 pumps under each arm daily, Disp: 180 mL, Rfl: 0  No current facility-administered medications for this visit.    Assessment:        Patient Active Problem List   Diagnosis   • Diabetes mellitus (CMS/HCC)   • Hypersomnia with sleep apnea   • ANNEL on CPAP   • Hypercholesterolemia   • Primary hypothyroidism   • Hyperlipidemia   • Chronic pain   • Low testosterone   • Vitamin D deficiency   • Hypogonadism in male   • Chronic coronary artery disease   • Obesity (BMI 30-39.9)   • Benign essential HTN   • Colitis   • Rectal bleeding   • Gastroesophageal reflux disease   • Hyperplastic colonic polyp   • Peripheral polyneuropathy   • Diarrhea   • Unilateral recurrent inguinal hernia without obstruction or gangrene               Plan:            ICD-10-CM ICD-9-CM   1. Unstable angina (CMS/HCC) I20.0 411.1   2. Coronary artery disease involving native heart with unstable angina pectoris, unspecified vessel or lesion type (CMS/HCC) I25.110 414.01     413.9   3. Status post insertion of drug eluting coronary artery stent Z95.5 V45.82   4. Hyperlipidemia, unspecified hyperlipidemia type E78.5 272.4   5. Hypercholesterolemia E78.00 272.0   6. Obesity (BMI 30-39.9) E66.9 278.00     1. Unstable angina (CMS/HCC)  Considering significant chest pains and tightness in chest, gradually worsening with a frequency of L4 to 5 times a day patient will be admitted to the hospital with initial repeat EKGs cardiac enzymes and proceed with a echocardiogram stress test    2.  Coronary artery disease involving native heart with unstable angina pectoris, unspecified vessel or lesion type (CMS/HCC)  Significant chest pain with unstable angina    3. Status post insertion of drug eluting coronary artery stent  Patient having recurrent chest pain    4. Hyperlipidemia, unspecified hyperlipidemia type  Counseling done    5. Hypercholesterolemia      6. Obesity (BMI 30-39.9)  Counseling done    Patient is being admitted to the hospital for the further ischemic workup    COUNSELING:    Chetan Smith was given to patient for the following topics: diagnostic results, risk factor reductions, impressions, risks and benefits of treatment options and importance of treatment compliance .       SMOKING COUNSELING:    Counseling given: Not Answered      EMR Dragon/Transcription disclaimer:   Much of this encounter note is an electronic transcription/translation of spoken language to printed text. The electronic translation of spoken language may permit erroneous, or at times, nonsensical words or phrases to be inadvertently transcribed; Although I have reviewed the note for such errors, some may still exist.

## 2018-10-03 NOTE — CONSULTS
Internal medicine consult    Referring physician  Dr. MUKHERJEE    Reason for consult  Follow medical problems    Chief complaint  Shortness of breath  Chest pain    History of present illness  52-year-old white male who is well-known to our service with history of coronary artery disease diabetes mellitus hypertension hypothyroidism hyperlipidemia obstructive sleep apnea admitted to cardiology service with chest pain shortness of breath and I'm asked to the patient for medical problem.  At the time of interview with no chest pain medication short of breath with minimal exertion.  Patient denies any fever chills abdominal pain nausea vomiting diarrhea.  Patient also denies any leg swelling or weight gain rather he is losing some weight.    PAST MEDICAL HISTORY  • Coronary artery disease     • Cryptococcal pneumonitis 2010   • Diabetes mellitus     • Disease of thyroid gland     • Hyperlipidemia     • Hypothyroidism     • ANNEL on CPAP     • Pulmonary nodule     • Testosterone deficiency     • Type 2 diabetes mellitus     • Vitamin D deficiency        PAST SURGICAL HISTORY  Surgical History             Past Surgical History:   Procedure Laterality Date   • ANTERIOR CERVICAL CORPECTOMY W/ FUSION   2006     C2-3   • ANTERIOR CERVICAL DISCECTOMY W/ FUSION   2012     C2-T2 PREVIOUS HARDWARE REMOVED AND REVISED   • ANTERIOR CERVICAL FUSION   2008     C3-5   • CARDIAC CATHETERIZATION N/A 6/16/2016     Procedure: Coronary angiography;  Surgeon: Emiliano Gordon MD;  Location: [facility] CATH INVASIVE LOCATION;  Service:    • CARDIAC CATHETERIZATION N/A 6/16/2016     Procedure: Left Heart Cath;  Surgeon: Emiliano Gordon MD;  Location: [facility] CATH INVASIVE LOCATION;  Service:    • CARDIAC CATHETERIZATION N/A 6/16/2016     Procedure: Left ventriculography;  Surgeon: Emiliano Gordon MD;  Location: [facility] CATH INVASIVE LOCATION;  Service:    • CORONARY ANGIOPLASTY   01/2015   • CORONARY STENT PLACEMENT       • HERNIA REPAIR    "    • KNEE ARTHROSCOPY             FAMILY HISTORY            Family History   Problem Relation Age of Onset   • Heart disease Mother     • Hypertension Mother     • Hypertension Father     • Heart disease Father        SOCIAL HISTORY  Social History   Social History                Social History   • Marital status:        Spouse name: N/A   • Number of children: N/A   • Years of education: N/A            Occupational History   • Not on file.              Social History Main Topics   • Smoking status: Never Smoker   • Smokeless tobacco: Never Used   • Alcohol use No   • Drug use: No   • Sexual activity: Defer              Other Topics Concern   • Not on file            Social History Narrative   • No narrative on file         ALLERGIES  Adhesive tape  Home medications reviewed     REVIEW OF SYSTEMS  Review of Systems   Constitutional: Negative for activity change, appetite change and fever.   HENT: Negative for congestion and sore throat.    Eyes: Negative.    Respiratory: Negative for cough and shortness of breath.    Cardiovascular: Negative for chest pain and leg swelling.   Gastrointestinal: Negative  Endocrine: Negative.    Genitourinary: Negative for decreased urine volume and dysuria.   Musculoskeletal: Negative for neck pain.   Skin: Negative for rash and wound.   Allergic/Immunologic: Negative.    Neurological: Negative for weakness, numbness and headaches.   Hematological: Negative.    Psychiatric/Behavioral: Negative.    All other systems reviewed and are negative.     PHYSICAL EXAM  Blood pressure 124/80, pulse 73, temperature 97.9 °F (36.6 °C), temperature source Oral, resp. rate 16, height 185.4 cm (72.99\"), weight 126 kg (278 lb 6.4 oz), SpO2 99 %.    Constitutional: He is oriented to person, place, and time and well-developed, well-nourished, and in no distress.   Head: Normocephalic and atraumatic.   Eyes: EOM are normal. Pupils are equal, round, and reactive to light.   Neck: Normal range of " motion. Neck supple.   Cardiovascular: Normal rate, regular rhythm and normal heart sounds.    Pulmonary/Chest: Effort normal and breath sounds normal. No respiratory distress.   Abdominal: Soft. There is no rebound and no guarding.  Bowel sounds positive   Musculoskeletal: Normal range of motion. He exhibits no edema.   Neurological: He is alert and oriented to person, place, and time. He has normal sensation and normal strength.   Skin: Skin is warm and dry.   Psychiatric: Mood and affect normal.      LAB RESULTS  Lab Results (last 24 hours)     Procedure Component Value Units Date/Time    POC Glucose Once [[id number]]  (Abnormal) Collected:  10/03/18 1624    Specimen:  Blood Updated:  10/03/18 1625     Glucose 136 (H) mg/dL     Narrative:       Meter: [id number] : [id number] Salina Maxwell NA    Troponin [[id number]]  (Normal) Collected:  10/03/18 1520    Specimen:  Blood Updated:  10/03/18 1601     Troponin T <0.010 ng/mL     Narrative:       Troponin T Reference Ranges:  Less than 0.03 ng/mL:    Negative for AMI  0.03 to 0.09 ng/mL:      Indeterminant for AMI  Greater than 0.09 ng/mL: Positive for AMI        Imaging Results (last 24 hours)     ** No results found for the last 24 hours. **          Current Facility-Administered Medications:   •  acetaminophen (TYLENOL) tablet 650 mg, 650 mg, Oral, Q6H PRN, Bekah Ball MD, 650 mg at 10/03/18 1419  •  [COMPLETED] aspirin chewable tablet 81 mg, 81 mg, Oral, Once, 81 mg at 10/03/18 1423 **AND** [START ON 10/4/2018] aspirin EC tablet 81 mg, 81 mg, Oral, Daily, Gretchen May APRN  •  atorvastatin (LIPITOR) tablet 40 mg, 40 mg, Oral, Nightly, Gretchen May APRN  •  enoxaparin (LOVENOX) syringe 40 mg, 40 mg, Subcutaneous, Q24H, Gretchen May APRN, 40 mg at 10/03/18 1424  •  [START ON 10/4/2018] levothyroxine (SYNTHROID, LEVOTHROID) tablet 125 mcg, 125 mcg, Oral, QAM, Gretchen May, APRN  •  lisinopril (PRINIVIL,ZESTRIL) tablet 5  mg, 5 mg, Oral, Nightly, Gretchen May APRN  •  nitroglycerin (NITROSTAT) ointment 1 inch, 1 inch, Topical, Q6H, Gretchen May APRN, 1 inch at 10/03/18 1424  •  nitroglycerin (NITROSTAT) SL tablet 0.4 mg, 0.4 mg, Sublingual, Q5 Min PRN, Gretchen May APRN  •  [START ON 10/4/2018] pantoprazole (PROTONIX) EC tablet 40 mg, 40 mg, Oral, Q AM, Gretchen May APRN     ASSESSMENT  Chest pain with known coronary artery disease rule out acute coronary syndrome  Diabetes mellitus  Hypertension  Hyperlipidemia  Hypothyroidism  Obstructive sleep apnea  Gastroesophageal reflux disease  History of lung nodule  Morbidly obese    PLAN  Agree with current care  Start Accu-Chek with low-dose sliding scale insulin   Continue home medication and adjust the doses  Stress ulcer DVT prophylaxis  Supportive care  Check hemoglobin A1c TSH lipid profile  Repeat labs in the morning  Will follow with Dr. MUKHERJEE further recommendation according to hospital course    TOÑA MEDLEY MD

## 2018-10-03 NOTE — H&P
Subjective     Chetan Grimm is a 52 y.o. male who here for follow up     CC  Cp, radiate to back and elbow, anytimes, several times, sob on lt side, 5-10 min, very tired, fatigue  HPI  52-year-old male with known history of coronary artery disease, hyperlipidemia and hypercholesterolemia with morbid obesity here for the cardiac evaluation as the patient has been complaining of the chest pains retrosternal mild-to-moderate radiating to the back as well as the left upper arm associated with weakness and shortness of breath gradually worsening over the several weeks associated with extreme fatigue, also has significant cardiac risk factors with diabetes obesity,          Problem List Items Addressed This Visit               Cardiovascular and Mediastinum     Hypercholesterolemia     Hyperlipidemia     Coronary artery disease involving native heart with unstable angina pectoris (CMS/HCC)     Relevant Medications     nitroglycerin (NITROSTAT) 0.4 MG SL tablet     Unstable angina (CMS/HCC) - Primary     Relevant Medications     nitroglycerin (NITROSTAT) 0.4 MG SL tablet          Digestive     Obesity (BMI 30-39.9)          Other     Status post insertion of drug eluting coronary artery stent          .     The following portions of the patient's history were reviewed and updated as appropriate: allergies, current medications, past family history, past medical history, past social history, past surgical history and problem list.     Medical History        Past Medical History:   Diagnosis Date   • Cervical disc disease with myelopathy     • Colon polyps     • Coronary artery disease     • Cryptococcal pneumonitis (CMS/HCC) 2010     TREATED WITH AMPHOTERACIN B   • Diabetes mellitus, type 2 (CMS/HCC)       TYPE 2   • Diabetic peripheral neuropathy (CMS/HCC)     • Exposure to SARS-associated coronavirus 2016   • GERD (gastroesophageal reflux disease)     • History of chest pain 2015   • History of colitis 2018   • History  "of DVT (deep vein thrombosis) 2009     RIGHT ARM    • Hyperlipidemia     • Hypothyroidism     • Inguinal hernia     • Iron deficiency anemia     • ANNEL on CPAP       WEAR CPAP   • Pneumonia, bacterial 04/2018   • Pulmonary nodule     • Rectus diastasis     • Testosterone deficiency     • TIA (transient ischemic attack) 07/13/2013     NO RESIDUAL   • Vitamin D deficiency          reports that he has never smoked. He has never used smokeless tobacco. He reports that he does not drink alcohol or use drugs.         Family History   Problem Relation Age of Onset   • Heart disease Mother     • Hypertension Mother     • Hypertension Father     • Heart disease Father     • Cancer Brother     • Malig Hyperthermia Neg Hx           Review of Systems  Constitutional: No wt loss, fever, fatigue  Gastrointestinal: No nausea, abdominal pain  Behavioral/Psych: No insomnia or anxiety   Cardiovascular chest pains and tightness in chest  Objective:         Objective                Objective    Physical Exam  /79 (BP Location: Left arm, Patient Position: Sitting)   Pulse 76   Ht 185.4 cm (73\")   Wt 127 kg (281 lb)   BMI 37.07 kg/m²     General appearance: NAD, conversant , morbidly obesity   Eyes: anicteric sclerae, moist conjunctivae; no lid-lag; PERRLA   HENT: Atraumatic; oropharynx clear with moist mucous membranes and no mucosal ulcerations;  normal hard and soft palate   Neck: Trachea midline; FROM, supple, no thyromegaly or lymphadenopathy   Lungs: CTA, with normal respiratory effort and no intercostal retractions   CV: S1-S2 regular, no murmurs, no rub, no gallop   Abdomen: Soft, non-tender; no masses or HSM   Extremities: No peripheral edema or extremity lymphadenopathy  Skin: Normal temperature, turgor and texture; no rash, ulcers or subcutaneous nodules   Psych: Appropriate affect, alert and oriented to person, place and time               Cardiographics  @  ECG 12 Lead  Date/Time: 10/3/2018 10:41 AM  Performed by: " FELIX RINCON  Authorized by: FELIX RINCON   Comparison: compared with previous ECG   Similar to previous ECG  Rhythm: sinus rhythm  Conduction: right bundle branch block  Clinical impression: abnormal ECG            Echocardiogram:          Current Outpatient Prescriptions:   •  ACCU-CHEK FASTCLIX LANCETS misc, Use to test BG 2x daily, Disp: , Rfl:   •  Albiglutide (TANZEUM) 50 MG pen-injector, Inject 50 mg under the skin into the appropriate area as directed 1 (One) Time Per Week. TUESDAYS, Disp: , Rfl:   •  ascorbic acid (VITAMIN C) 1000 MG tablet, Take 2,000 mg by mouth 2 (Two) Times a Day., Disp: , Rfl:   •  aspirin 81 MG chewable tablet, Chew 81 mg Daily. TO HOLD 3 DAYS PRIOR TO OR, Disp: , Rfl:   •  atorvastatin (LIPITOR) 40 MG tablet, Take 40 mg by mouth Every Night., Disp: , Rfl:   •  Cholecalciferol (VITAMIN D3) 5000 units capsule capsule, Take 5,000 Units by mouth Daily., Disp: , Rfl:   •  Cyanocobalamin (VITAMIN B-12 ER PO), Take 2,500 mcg by mouth Daily., Disp: , Rfl:   •  cyclobenzaprine (FLEXERIL) 10 MG tablet, Take 10 mg by mouth 2 (two) times a day as needed., Disp: , Rfl:   •  dapagliflozin-metformin HCl ER (XIGDUO XR) 5-1000 MG tablet, Take 1 tablet by mouth 2 (Two) Times a Day., Disp: , Rfl:   •  gabapentin (NEURONTIN) 100 MG capsule, TAKE 1 CAPSULE BY MOUTH THREE TIMES DAILY, Disp: 90 capsule, Rfl: 1  •  glipiZIDE (GLUCOTROL) 5 MG tablet, Take 5 mg by mouth 2 (Two) Times a Day Before Meals., Disp: , Rfl:   •  glucagon (GLUCAGON EMERGENCY) 1 MG injection, Inject 1 mg under the skin 1 (One) Time As Needed for low blood sugar for up to 1 dose., Disp: 1 kit, Rfl: 5  •  glucose blood (ACCU-CHEK GUIDE) test strip, Use to test BG 2x daily, Disp: , Rfl:   •  insulin degludec (TRESIBA FLEXTOUCH) 100 UNIT/ML solution pen-injector injection, Inject 46 Units under the skin into the appropriate area as directed Every Night., Disp: , Rfl:   •  IRON PO, Take 65 mg by mouth Daily., Disp: , Rfl:    •  levothyroxine (SYNTHROID) 125 MCG tablet, Take 1 tablet by mouth Daily., Disp: 30 tablet, Rfl: 11  •  lisinopril (PRINIVIL,ZESTRIL) 5 MG tablet, Take 5 mg by mouth every night., Disp: , Rfl:   •  Omega-3 Fatty Acids (FISH OIL) 1000 MG capsule capsule, Take 1,000 mg by mouth Daily With Breakfast. HELD FOR OR, Disp: , Rfl:   •  pantoprazole (PROTONIX) 40 MG EC tablet, Take 40 mg by mouth 2 (Two) Times a Day., Disp: , Rfl:   •  Sennosides-Docusate Sodium (EQ STOOL SOFTENER/LAXATIVE PO), Take 100 mg by mouth 2 (Two) Times a Day., Disp: , Rfl:   •  AXIRON 30 MG/ACT solution, 2 pumps under each arm daily, Disp: 180 mL, Rfl: 0  No current facility-administered medications for this visit.    Assessment:             Patient Active Problem List   Diagnosis   • Diabetes mellitus (CMS/HCC)   • Hypersomnia with sleep apnea   • ANNEL on CPAP   • Hypercholesterolemia   • Primary hypothyroidism   • Hyperlipidemia   • Chronic pain   • Low testosterone   • Vitamin D deficiency   • Hypogonadism in male   • Chronic coronary artery disease   • Obesity (BMI 30-39.9)   • Benign essential HTN   • Colitis   • Rectal bleeding   • Gastroesophageal reflux disease   • Hyperplastic colonic polyp   • Peripheral polyneuropathy   • Diarrhea   • Unilateral recurrent inguinal hernia without obstruction or gangrene                   Plan:         Plan           ICD-10-CM ICD-9-CM   1. Unstable angina (CMS/HCC) I20.0 411.1   2. Coronary artery disease involving native heart with unstable angina pectoris, unspecified vessel or lesion type (CMS/HCC) I25.110 414.01       413.9   3. Status post insertion of drug eluting coronary artery stent Z95.5 V45.82   4. Hyperlipidemia, unspecified hyperlipidemia type E78.5 272.4   5. Hypercholesterolemia E78.00 272.0   6. Obesity (BMI 30-39.9) E66.9 278.00      1. Unstable angina (CMS/HCC)  Considering significant chest pains and tightness in chest, gradually worsening with a frequency of L4 to 5 times a day  patient will be admitted to the hospital with initial repeat EKGs cardiac enzymes and proceed with a echocardiogram stress test     2. Coronary artery disease involving native heart with unstable angina pectoris, unspecified vessel or lesion type (CMS/HCC)  Significant chest pain with unstable angina     3. Status post insertion of drug eluting coronary artery stent  Patient having recurrent chest pain     4. Hyperlipidemia, unspecified hyperlipidemia type  Counseling done     5. Hypercholesterolemia        6. Obesity (BMI 30-39.9)  Counseling done     Patient is being admitted to the hospital for the further ischemic workup     COUNSELING:     Chetan Smith was given to patient for the following topics: diagnostic results, risk factor reductions, impressions, risks and benefits of treatment options and importance of treatment compliance .         SMOKING COUNSELING:     Counseling given: Not Answered        EMR Dragon/Transcription disclaimer:   Much of this encounter note is an electronic transcription/translation of spoken language to printed text. The electronic translation of spoken language may permit erroneous, or at times, nonsensical words or phrases to be inadvertently transcribed; Although I have reviewed the note for such errors, some may still exist.

## 2018-10-04 ENCOUNTER — APPOINTMENT (OUTPATIENT)
Dept: NUCLEAR MEDICINE | Facility: HOSPITAL | Age: 53
End: 2018-10-04

## 2018-10-04 VITALS
BODY MASS INDEX: 36.9 KG/M2 | HEIGHT: 73 IN | WEIGHT: 278.4 LBS | RESPIRATION RATE: 16 BRPM | DIASTOLIC BLOOD PRESSURE: 71 MMHG | HEART RATE: 74 BPM | SYSTOLIC BLOOD PRESSURE: 124 MMHG | OXYGEN SATURATION: 99 % | TEMPERATURE: 97.8 F

## 2018-10-04 LAB
ALBUMIN SERPL-MCNC: 3.7 G/DL (ref 3.5–5.2)
ALBUMIN/GLOB SERPL: 1.4 G/DL
ALP SERPL-CCNC: 90 U/L (ref 39–117)
ALT SERPL W P-5'-P-CCNC: 26 U/L (ref 1–41)
ANION GAP SERPL CALCULATED.3IONS-SCNC: 13.2 MMOL/L
AORTIC DIMENSIONLESS INDEX: 0.9 (DI)
AST SERPL-CCNC: 17 U/L (ref 1–40)
BASOPHILS # BLD AUTO: 0.05 10*3/MM3 (ref 0–0.2)
BASOPHILS NFR BLD AUTO: 0.6 % (ref 0–1.5)
BH CV ECHO MEAS - ACS: 2.3 CM
BH CV ECHO MEAS - AO MAX PG (FULL): 0.98 MMHG
BH CV ECHO MEAS - AO MAX PG: 6.6 MMHG
BH CV ECHO MEAS - AO MEAN PG (FULL): 0 MMHG
BH CV ECHO MEAS - AO MEAN PG: 3 MMHG
BH CV ECHO MEAS - AO ROOT AREA (BSA CORRECTED): 1.8
BH CV ECHO MEAS - AO ROOT AREA: 15.2 CM^2
BH CV ECHO MEAS - AO ROOT DIAM: 4.4 CM
BH CV ECHO MEAS - AO V2 MAX: 128 CM/SEC
BH CV ECHO MEAS - AO V2 MEAN: 79.6 CM/SEC
BH CV ECHO MEAS - AO V2 VTI: 25.2 CM
BH CV ECHO MEAS - AVA(I,A): 4.2 CM^2
BH CV ECHO MEAS - AVA(I,D): 4.2 CM^2
BH CV ECHO MEAS - AVA(V,A): 4.2 CM^2
BH CV ECHO MEAS - AVA(V,D): 4.2 CM^2
BH CV ECHO MEAS - BSA(HAYCOCK): 2.6 M^2
BH CV ECHO MEAS - BSA: 2.5 M^2
BH CV ECHO MEAS - BZI_BMI: 37.1 KILOGRAMS/M^2
BH CV ECHO MEAS - BZI_METRIC_HEIGHT: 185.4 CM
BH CV ECHO MEAS - BZI_METRIC_WEIGHT: 127.5 KG
BH CV ECHO MEAS - EDV(CUBED): 125 ML
BH CV ECHO MEAS - EDV(MOD-SP2): 168 ML
BH CV ECHO MEAS - EDV(MOD-SP4): 136 ML
BH CV ECHO MEAS - EDV(TEICH): 118.2 ML
BH CV ECHO MEAS - EF(CUBED): 68.6 %
BH CV ECHO MEAS - EF(MOD-BP): 62 %
BH CV ECHO MEAS - EF(MOD-SP2): 61.9 %
BH CV ECHO MEAS - EF(MOD-SP4): 58.8 %
BH CV ECHO MEAS - EF(TEICH): 59.9 %
BH CV ECHO MEAS - ESV(CUBED): 39.3 ML
BH CV ECHO MEAS - ESV(MOD-SP2): 64 ML
BH CV ECHO MEAS - ESV(MOD-SP4): 56 ML
BH CV ECHO MEAS - ESV(TEICH): 47.4 ML
BH CV ECHO MEAS - FS: 32 %
BH CV ECHO MEAS - IVS/LVPW: 1.1
BH CV ECHO MEAS - IVSD: 1.1 CM
BH CV ECHO MEAS - LAT PEAK E' VEL: 11 CM/SEC
BH CV ECHO MEAS - LV DIASTOLIC VOL/BSA (35-75): 54.7 ML/M^2
BH CV ECHO MEAS - LV MASS(C)D: 194.4 GRAMS
BH CV ECHO MEAS - LV MASS(C)DI: 78.2 GRAMS/M^2
BH CV ECHO MEAS - LV MAX PG: 5.6 MMHG
BH CV ECHO MEAS - LV MEAN PG: 3 MMHG
BH CV ECHO MEAS - LV SYSTOLIC VOL/BSA (12-30): 22.5 ML/M^2
BH CV ECHO MEAS - LV V1 MAX: 118 CM/SEC
BH CV ECHO MEAS - LV V1 MEAN: 76.4 CM/SEC
BH CV ECHO MEAS - LV V1 VTI: 23.5 CM
BH CV ECHO MEAS - LVIDD: 5 CM
BH CV ECHO MEAS - LVIDS: 3.4 CM
BH CV ECHO MEAS - LVLD AP2: 10.3 CM
BH CV ECHO MEAS - LVLD AP4: 8.8 CM
BH CV ECHO MEAS - LVLS AP2: 8.2 CM
BH CV ECHO MEAS - LVLS AP4: 7.7 CM
BH CV ECHO MEAS - LVOT AREA (M): 4.5 CM^2
BH CV ECHO MEAS - LVOT AREA: 4.5 CM^2
BH CV ECHO MEAS - LVOT DIAM: 2.4 CM
BH CV ECHO MEAS - LVPWD: 1 CM
BH CV ECHO MEAS - MED PEAK E' VEL: 8 CM/SEC
BH CV ECHO MEAS - MV A DUR: 0.18 SEC
BH CV ECHO MEAS - MV A MAX VEL: 77.6 CM/SEC
BH CV ECHO MEAS - MV DEC SLOPE: 377.5 CM/SEC^2
BH CV ECHO MEAS - MV DEC TIME: 0.24 SEC
BH CV ECHO MEAS - MV E MAX VEL: 79.5 CM/SEC
BH CV ECHO MEAS - MV E/A: 1
BH CV ECHO MEAS - MV MAX PG: 3.3 MMHG
BH CV ECHO MEAS - MV MEAN PG: 1 MMHG
BH CV ECHO MEAS - MV P1/2T MAX VEL: 88.2 CM/SEC
BH CV ECHO MEAS - MV P1/2T: 68.4 MSEC
BH CV ECHO MEAS - MV V2 MAX: 91.1 CM/SEC
BH CV ECHO MEAS - MV V2 MEAN: 56.9 CM/SEC
BH CV ECHO MEAS - MV V2 VTI: 29 CM
BH CV ECHO MEAS - MVA P1/2T LCG: 2.5 CM^2
BH CV ECHO MEAS - MVA(P1/2T): 3.2 CM^2
BH CV ECHO MEAS - MVA(VTI): 3.7 CM^2
BH CV ECHO MEAS - PA ACC TIME: 0.11 SEC
BH CV ECHO MEAS - PA MAX PG (FULL): 1.6 MMHG
BH CV ECHO MEAS - PA MAX PG: 3.8 MMHG
BH CV ECHO MEAS - PA PR(ACCEL): 31.3 MMHG
BH CV ECHO MEAS - PA V2 MAX: 97.9 CM/SEC
BH CV ECHO MEAS - PULM A REVS DUR: 0.17 SEC
BH CV ECHO MEAS - PULM A REVS VEL: 37.4 CM/SEC
BH CV ECHO MEAS - PULM DIAS VEL: 48.1 CM/SEC
BH CV ECHO MEAS - PULM S/D: 1.4
BH CV ECHO MEAS - PULM SYS VEL: 68.2 CM/SEC
BH CV ECHO MEAS - PVA(V,A): 3.4 CM^2
BH CV ECHO MEAS - PVA(V,D): 3.4 CM^2
BH CV ECHO MEAS - QP/QS: 0.57
BH CV ECHO MEAS - RV MAX PG: 2.2 MMHG
BH CV ECHO MEAS - RV MEAN PG: 1 MMHG
BH CV ECHO MEAS - RV V1 MAX: 73.9 CM/SEC
BH CV ECHO MEAS - RV V1 MEAN: 42 CM/SEC
BH CV ECHO MEAS - RV V1 VTI: 13.3 CM
BH CV ECHO MEAS - RVOT AREA: 4.5 CM^2
BH CV ECHO MEAS - RVOT DIAM: 2.4 CM
BH CV ECHO MEAS - SI(AO): 154.1 ML/M^2
BH CV ECHO MEAS - SI(CUBED): 34.5 ML/M^2
BH CV ECHO MEAS - SI(LVOT): 42.8 ML/M^2
BH CV ECHO MEAS - SI(MOD-SP2): 41.8 ML/M^2
BH CV ECHO MEAS - SI(MOD-SP4): 32.2 ML/M^2
BH CV ECHO MEAS - SI(TEICH): 28.5 ML/M^2
BH CV ECHO MEAS - SV(AO): 383.2 ML
BH CV ECHO MEAS - SV(CUBED): 85.7 ML
BH CV ECHO MEAS - SV(LVOT): 106.3 ML
BH CV ECHO MEAS - SV(MOD-SP2): 104 ML
BH CV ECHO MEAS - SV(MOD-SP4): 80 ML
BH CV ECHO MEAS - SV(RVOT): 60.2 ML
BH CV ECHO MEAS - SV(TEICH): 70.8 ML
BH CV ECHO MEAS - TAPSE (>1.6): 2.3 CM2
BH CV ECHO MEASUREMENTS AVERAGE E/E' RATIO: 8.37
BH CV STRESS COMMENTS STAGE 1: NORMAL
BH CV STRESS DOSE REGADENOSON STAGE 1: 0.4
BH CV STRESS DURATION MIN STAGE 1: 0
BH CV STRESS DURATION SEC STAGE 1: 10
BH CV STRESS PROTOCOL 1: NORMAL
BH CV STRESS RECOVERY BP: NORMAL MMHG
BH CV STRESS RECOVERY HR: 85 BPM
BH CV STRESS STAGE 1: 1
BH CV VAS BP RIGHT ARM: NORMAL MMHG
BH CV XLRA - RV BASE: 3.5 CM
BH CV XLRA - TDI S': 15 CM/SEC
BILIRUB SERPL-MCNC: 0.4 MG/DL (ref 0.1–1.2)
BUN BLD-MCNC: 15 MG/DL (ref 6–20)
BUN/CREAT SERPL: 18.3 (ref 7–25)
CALCIUM SPEC-SCNC: 8.9 MG/DL (ref 8.6–10.5)
CHLORIDE SERPL-SCNC: 102 MMOL/L (ref 98–107)
CHOLEST SERPL-MCNC: 153 MG/DL (ref 0–200)
CO2 SERPL-SCNC: 24.8 MMOL/L (ref 22–29)
CREAT BLD-MCNC: 0.82 MG/DL (ref 0.76–1.27)
DEPRECATED RDW RBC AUTO: 44.6 FL (ref 37–54)
EOSINOPHIL # BLD AUTO: 0.55 10*3/MM3 (ref 0–0.7)
EOSINOPHIL NFR BLD AUTO: 7.1 % (ref 0.3–6.2)
ERYTHROCYTE [DISTWIDTH] IN BLOOD BY AUTOMATED COUNT: 13.3 % (ref 11.5–14.5)
GFR SERPL CREATININE-BSD FRML MDRD: 99 ML/MIN/1.73
GLOBULIN UR ELPH-MCNC: 2.6 GM/DL
GLUCOSE BLD-MCNC: 195 MG/DL (ref 65–99)
GLUCOSE BLDC GLUCOMTR-MCNC: 205 MG/DL (ref 70–130)
HBA1C MFR BLD: 7.81 % (ref 4.8–5.6)
HCT VFR BLD AUTO: 42.3 % (ref 40.4–52.2)
HDLC SERPL-MCNC: 29 MG/DL (ref 40–60)
HGB BLD-MCNC: 13.6 G/DL (ref 13.7–17.6)
IMM GRANULOCYTES # BLD: 0.08 10*3/MM3 (ref 0–0.03)
IMM GRANULOCYTES NFR BLD: 1 % (ref 0–0.5)
LDLC SERPL CALC-MCNC: 80 MG/DL (ref 0–100)
LDLC/HDLC SERPL: 2.76 {RATIO}
LEFT ATRIUM VOLUME INDEX: 22 ML/M2
LEFT ATRIUM VOLUME: 52 CM3
LV EF NUC BP: 66 %
LYMPHOCYTES # BLD AUTO: 1.73 10*3/MM3 (ref 0.9–4.8)
LYMPHOCYTES NFR BLD AUTO: 22.3 % (ref 19.6–45.3)
MAXIMAL PREDICTED HEART RATE: 168 BPM
MAXIMAL PREDICTED HEART RATE: 168 BPM
MCH RBC QN AUTO: 29.8 PG (ref 27–32.7)
MCHC RBC AUTO-ENTMCNC: 32.2 G/DL (ref 32.6–36.4)
MCV RBC AUTO: 92.8 FL (ref 79.8–96.2)
MONOCYTES # BLD AUTO: 0.51 10*3/MM3 (ref 0.2–1.2)
MONOCYTES NFR BLD AUTO: 6.6 % (ref 5–12)
NEUTROPHILS # BLD AUTO: 4.85 10*3/MM3 (ref 1.9–8.1)
NEUTROPHILS NFR BLD AUTO: 62.4 % (ref 42.7–76)
NT-PROBNP SERPL-MCNC: 8.5 PG/ML (ref 0–900)
PERCENT MAX PREDICTED HR: 60.12 %
PLATELET # BLD AUTO: 217 10*3/MM3 (ref 140–500)
PMV BLD AUTO: 10.1 FL (ref 6–12)
POTASSIUM BLD-SCNC: 3.7 MMOL/L (ref 3.5–5.2)
PROT SERPL-MCNC: 6.3 G/DL (ref 6–8.5)
RBC # BLD AUTO: 4.56 10*6/MM3 (ref 4.6–6)
SODIUM BLD-SCNC: 140 MMOL/L (ref 136–145)
STRESS BASELINE BP: NORMAL MMHG
STRESS BASELINE HR: 74 BPM
STRESS PERCENT HR: 71 %
STRESS POST PEAK BP: NORMAL MMHG
STRESS POST PEAK HR: 101 BPM
STRESS TARGET HR: 143 BPM
STRESS TARGET HR: 143 BPM
TRIGL SERPL-MCNC: 220 MG/DL (ref 0–150)
TROPONIN T SERPL-MCNC: <0.01 NG/ML (ref 0–0.03)
TSH SERPL DL<=0.05 MIU/L-ACNC: 2.7 MIU/ML (ref 0.27–4.2)
VLDLC SERPL-MCNC: 44 MG/DL (ref 5–40)
WBC NRBC COR # BLD: 7.77 10*3/MM3 (ref 4.5–10.7)

## 2018-10-04 PROCEDURE — 82962 GLUCOSE BLOOD TEST: CPT

## 2018-10-04 PROCEDURE — 93017 CV STRESS TEST TRACING ONLY: CPT

## 2018-10-04 PROCEDURE — 80053 COMPREHEN METABOLIC PANEL: CPT | Performed by: HOSPITALIST

## 2018-10-04 PROCEDURE — 83880 ASSAY OF NATRIURETIC PEPTIDE: CPT | Performed by: HOSPITALIST

## 2018-10-04 PROCEDURE — 99217 PR OBSERVATION CARE DISCHARGE MANAGEMENT: CPT | Performed by: INTERNAL MEDICINE

## 2018-10-04 PROCEDURE — 78452 HT MUSCLE IMAGE SPECT MULT: CPT

## 2018-10-04 PROCEDURE — 85025 COMPLETE CBC W/AUTO DIFF WBC: CPT | Performed by: HOSPITALIST

## 2018-10-04 PROCEDURE — 84484 ASSAY OF TROPONIN QUANT: CPT | Performed by: NURSE PRACTITIONER

## 2018-10-04 PROCEDURE — A9500 TC99M SESTAMIBI: HCPCS | Performed by: INTERNAL MEDICINE

## 2018-10-04 PROCEDURE — 63710000001 INSULIN ASPART PER 5 UNITS: Performed by: HOSPITALIST

## 2018-10-04 PROCEDURE — 84443 ASSAY THYROID STIM HORMONE: CPT | Performed by: HOSPITALIST

## 2018-10-04 PROCEDURE — 78452 HT MUSCLE IMAGE SPECT MULT: CPT | Performed by: INTERNAL MEDICINE

## 2018-10-04 PROCEDURE — 25010000002 REGADENOSON 0.4 MG/5ML SOLUTION: Performed by: INTERNAL MEDICINE

## 2018-10-04 PROCEDURE — 83036 HEMOGLOBIN GLYCOSYLATED A1C: CPT | Performed by: HOSPITALIST

## 2018-10-04 PROCEDURE — 80061 LIPID PANEL: CPT | Performed by: HOSPITALIST

## 2018-10-04 PROCEDURE — 0 TECHNETIUM SESTAMIBI: Performed by: INTERNAL MEDICINE

## 2018-10-04 PROCEDURE — G0378 HOSPITAL OBSERVATION PER HR: HCPCS

## 2018-10-04 PROCEDURE — 93018 CV STRESS TEST I&R ONLY: CPT | Performed by: INTERNAL MEDICINE

## 2018-10-04 RX ADMIN — INSULIN ASPART 3 UNITS: 100 INJECTION, SOLUTION INTRAVENOUS; SUBCUTANEOUS at 12:13

## 2018-10-04 RX ADMIN — PANTOPRAZOLE SODIUM 40 MG: 40 TABLET, DELAYED RELEASE ORAL at 06:08

## 2018-10-04 RX ADMIN — NITROGLYCERIN 1 INCH: 20 OINTMENT TOPICAL at 06:07

## 2018-10-04 RX ADMIN — TECHNETIUM TC 99M SESTAMIBI 1 DOSE: 1 INJECTION INTRAVENOUS at 09:35

## 2018-10-04 RX ADMIN — ASPIRIN 81 MG: 81 TABLET, DELAYED RELEASE ORAL at 09:43

## 2018-10-04 RX ADMIN — LEVOTHYROXINE SODIUM 125 MCG: 125 TABLET ORAL at 06:07

## 2018-10-04 RX ADMIN — REGADENOSON 0.4 MG: 0.08 INJECTION, SOLUTION INTRAVENOUS at 09:35

## 2018-10-04 RX ADMIN — TECHNETIUM TC 99M SESTAMIBI 1 DOSE: 1 INJECTION INTRAVENOUS at 06:35

## 2018-10-04 NOTE — PROGRESS NOTES
"Daily progress note    Chief complaint  Doing little better  Occasional chest pain and shortness of breath    History of present illness  52-year-old white male who is well-known to our service with history of coronary artery disease diabetes mellitus hypertension hypothyroidism hyperlipidemia obstructive sleep apnea admitted to cardiology service with chest pain shortness of breath and I'm asked to the patient for medical problem.  At the time of interview with no chest pain medication short of breath with minimal exertion.  Patient denies any fever chills abdominal pain nausea vomiting diarrhea.  Patient also denies any leg swelling or weight gain rather he is losing some weight.     REVIEW OF SYSTEMS  Review of Systems   Constitutional: Negative for activity change, appetite change and fever.   HENT: Negative for congestion and sore throat.    Eyes: Negative.    Respiratory: Negative for cough and shortness of breath.    Cardiovascular: Negative for chest pain and leg swelling.   Gastrointestinal: Negative  Endocrine: Negative.    Genitourinary: Negative for decreased urine volume and dysuria.   Musculoskeletal: Negative for neck pain.   Skin: Negative for rash and wound.   Allergic/Immunologic: Negative.    Neurological: Negative for weakness, numbness and headaches.   Hematological: Negative.    Psychiatric/Behavioral: Negative.    All other systems reviewed and are negative.     PHYSICAL EXAM  Blood pressure 124/71, pulse 74, temperature 97.8 °F (36.6 °C), temperature source Oral, resp. rate 16, height 185.4 cm (72.99\"), weight 126 kg (278 lb 6.4 oz), SpO2 99 %.    Constitutional: He is oriented to person, place, and time and well-developed, well-nourished, and in no distress.   Head: Normocephalic and atraumatic.   Eyes: EOM are normal. Pupils are equal, round, and reactive to light.   Neck: Normal range of motion. Neck supple.   Cardiovascular: Normal rate, regular rhythm and normal heart sounds. "    Pulmonary/Chest: Effort normal and breath sounds normal. No respiratory distress.   Abdominal: Soft. There is no rebound and no guarding.  Bowel sounds positive   Musculoskeletal: Normal range of motion. He exhibits no edema.   Neurological: He is alert and oriented to person, place, and time. He has normal sensation and normal strength.   Skin: Skin is warm and dry.   Psychiatric: Mood and affect normal.      LAB RESULTS  Lab Results (last 24 hours)     Procedure Component Value Units Date/Time    POC Glucose Once [[id number]]  (Abnormal) Collected:  10/04/18 1053    Specimen:  Blood Updated:  10/04/18 1054     Glucose 205 (H) mg/dL     Narrative:       Meter: [id number] : [id number] Cam Carcamo RN    Troponin [[id number]]  (Normal) Collected:  10/04/18 0200    Specimen:  Blood Updated:  10/04/18 0249     Troponin T <0.010 ng/mL     Narrative:       Troponin T Reference Ranges:  Less than 0.03 ng/mL:    Negative for AMI  0.03 to 0.09 ng/mL:      Indeterminant for AMI  Greater than 0.09 ng/mL: Positive for AMI    BNP [[id number]]  (Normal) Collected:  10/04/18 0200    Specimen:  Blood Updated:  10/04/18 0249     proBNP 8.5 pg/mL     Narrative:       Among patients with dyspnea, NT-proBNP is highly sensitive for the detection of acute congestive heart failure. In addition NT-proBNP of <300 pg/ml effectively rules out acute congestive heart failure with 99% negative predictive value.    TSH [[id number]]  (Normal) Collected:  10/04/18 0200    Specimen:  Blood Updated:  10/04/18 0249     TSH 2.700 mIU/mL     Comprehensive Metabolic Panel [[id number]]  (Abnormal) Collected:  10/04/18 0200    Specimen:  Blood Updated:  10/04/18 0238     Glucose 195 (H) mg/dL      BUN 15 mg/dL      Creatinine 0.82 mg/dL      Sodium 140 mmol/L      Potassium 3.7 mmol/L      Chloride 102 mmol/L      CO2 24.8 mmol/L      Calcium 8.9 mg/dL      Total Protein 6.3 g/dL      Albumin 3.70 g/dL      ALT (SGPT) 26 U/L      AST (SGOT) 17 U/L       Alkaline Phosphatase 90 U/L      Total Bilirubin 0.4 mg/dL      eGFR Non African Amer 99 mL/min/1.73      Globulin 2.6 gm/dL      A/G Ratio 1.4 g/dL      BUN/Creatinine Ratio 18.3     Anion Gap 13.2 mmol/L     Lipid Panel [[id number]]  (Abnormal) Collected:  10/04/18 0200    Specimen:  Blood Updated:  10/04/18 0238     Total Cholesterol 153 mg/dL      Triglycerides 220 (H) mg/dL      HDL Cholesterol 29 (L) mg/dL      LDL Cholesterol  80 mg/dL      VLDL Cholesterol 44 (H) mg/dL      LDL/HDL Ratio 2.76    Narrative:       Cholesterol Reference Ranges  (U.S. Department of Health and Human Services ATP III Classifications)    Desirable          <200 mg/dL  Borderline High    200-239 mg/dL  High Risk          >240 mg/dL      Triglyceride Reference Ranges  (U.S. Department of Health and Human Services ATP III Classifications)    Normal           <150 mg/dL  Borderline High  150-199 mg/dL  High             200-499 mg/dL  Very High        >500 mg/dL    HDL Reference Ranges  (U.S. Department of Health and Human Services ATP III Classifcations)    Low     <40 mg/dl (major risk factor for CHD)  High    >60 mg/dl ('negative' risk factor for CHD)        LDL Reference Ranges  (U.S. Department of Health and Human Services ATP III Classifcations)    Optimal          <100 mg/dL  Near Optimal     100-129 mg/dL  Borderline High  130-159 mg/dL  High             160-189 mg/dL  Very High        >189 mg/dL    Hemoglobin A1c [[id number]]  (Abnormal) Collected:  10/04/18 0200    Specimen:  Blood Updated:  10/04/18 0223     Hemoglobin A1C 7.81 (H) %     Narrative:       Hemoglobin A1C Ranges:    Increased Risk for Diabetes  5.7% to 6.4%  Diabetes                     >= 6.5%  Diabetic Goal                < 7.0%    CBC & Differential [[id number]] Collected:  10/04/18 0200    Specimen:  Blood Updated:  10/04/18 0217    Narrative:       The following orders were created for panel order CBC & Differential.  Procedure                                Abnormality         Status                     ---------                               -----------         ------                     CBC Auto Differential[[id number]]        Abnormal            Final result                 Please view results for these tests on the individual orders.    CBC Auto Differential [[id number]]  (Abnormal) Collected:  10/04/18 0200    Specimen:  Blood Updated:  10/04/18 0217     WBC 7.77 10*3/mm3      RBC 4.56 (L) 10*6/mm3      Hemoglobin 13.6 (L) g/dL      Hematocrit 42.3 %      MCV 92.8 fL      MCH 29.8 pg      MCHC 32.2 (L) g/dL      RDW 13.3 %      RDW-SD 44.6 fl      MPV 10.1 fL      Platelets 217 10*3/mm3      Neutrophil % 62.4 %      Lymphocyte % 22.3 %      Monocyte % 6.6 %      Eosinophil % 7.1 (H) %      Basophil % 0.6 %      Immature Grans % 1.0 (H) %      Neutrophils, Absolute 4.85 10*3/mm3      Lymphocytes, Absolute 1.73 10*3/mm3      Monocytes, Absolute 0.51 10*3/mm3      Eosinophils, Absolute 0.55 10*3/mm3      Basophils, Absolute 0.05 10*3/mm3      Immature Grans, Absolute 0.08 (H) 10*3/mm3     Troponin [[id number]]  (Normal) Collected:  10/03/18 1954    Specimen:  Blood Updated:  10/03/18 2131     Troponin T <0.010 ng/mL     Narrative:       Troponin T Reference Ranges:  Less than 0.03 ng/mL:    Negative for AMI  0.03 to 0.09 ng/mL:      Indeterminant for AMI  Greater than 0.09 ng/mL: Positive for AMI    POC Glucose Once [[id number]]  (Abnormal) Collected:  10/03/18 2032    Specimen:  Blood Updated:  10/03/18 2054     Glucose 205 (H) mg/dL     Narrative:       Meter: [id number] : [id number] Mohsen Boateng RN    POC Glucose Once [[id number]]  (Abnormal) Collected:  10/03/18 1624    Specimen:  Blood Updated:  10/03/18 1625     Glucose 136 (H) mg/dL     Narrative:       Meter: [id number] : [id number] Salnia GREEN    Troponin [[id number]]  (Normal) Collected:  10/03/18 1520    Specimen:  Blood Updated:  10/03/18 1601     Troponin T <0.010 ng/mL     Narrative:        Troponin T Reference Ranges:  Less than 0.03 ng/mL:    Negative for AMI  0.03 to 0.09 ng/mL:      Indeterminant for AMI  Greater than 0.09 ng/mL: Positive for AMI        Imaging Results (last 24 hours)     ** No results found for the last 24 hours. **          Current Facility-Administered Medications:   •  acetaminophen (TYLENOL) tablet 650 mg, 650 mg, Oral, Q6H PRN, Bekah Ball MD, 650 mg at 10/03/18 1419  •  [COMPLETED] aspirin chewable tablet 81 mg, 81 mg, Oral, Once, 81 mg at 10/03/18 1423 **AND** aspirin EC tablet 81 mg, 81 mg, Oral, Daily, Gretchen May APRN, 81 mg at 10/04/18 0943  •  atorvastatin (LIPITOR) tablet 40 mg, 40 mg, Oral, Nightly, Gretchen May APRN, 40 mg at 10/03/18 2033  •  enoxaparin (LOVENOX) syringe 40 mg, 40 mg, Subcutaneous, Q24H, Gretchen May APRN, 40 mg at 10/03/18 1424  •  insulin aspart (novoLOG) injection 0-7 Units, 0-7 Units, Subcutaneous, 4x Daily With Meals & Nightly, Ranjit Madden MD, 3 Units at 10/04/18 1213  •  levothyroxine (SYNTHROID, LEVOTHROID) tablet 125 mcg, 125 mcg, Oral, QAM, Gretchen May APRN, 125 mcg at 10/04/18 0607  •  lisinopril (PRINIVIL,ZESTRIL) tablet 5 mg, 5 mg, Oral, Nightly, Gretchen May APRN, 5 mg at 10/03/18 2033  •  nitroglycerin (NITROSTAT) ointment 1 inch, 1 inch, Topical, Q6H, Gretchen May APRN, Stopped at 10/04/18 1415  •  nitroglycerin (NITROSTAT) SL tablet 0.4 mg, 0.4 mg, Sublingual, Q5 Min PRN, Gretchen May APRN  •  pantoprazole (PROTONIX) EC tablet 40 mg, 40 mg, Oral, Q AM, Gretchen May APRN, 40 mg at 10/04/18 0608     ASSESSMENT  Chest pain with known coronary artery disease rule out acute coronary syndrome  Diabetes mellitus  Hypertension  Hyperlipidemia  Hypothyroidism  Obstructive sleep apnea  Gastroesophageal reflux disease  History of lung nodule  Morbidly obese    PLAN  CPM  Stress Cardiolite today  Accu-Chek with low-dose sliding scale insulin   Continue home  medication and adjust the doses  Stress ulcer DVT prophylaxis  Supportive car  Will follow with Dr. MUKHERJEE further recommendation according to hospital course    TOÑA MEDLEY MD

## 2018-10-04 NOTE — PLAN OF CARE
Problem: Patient Care Overview  Goal: Individualization and Mutuality  Outcome: Outcome(s) achieved Date Met: 10/04/18    Goal: Discharge Needs Assessment  Outcome: Outcome(s) achieved Date Met: 10/04/18   10/04/18 1543   Discharge Needs Assessment   Concerns to be Addressed no discharge needs identified   Patient/Family Anticipates Transition to home with family   Patient/Family Anticipated Services at Transition none   Transportation Concerns car, none   Transportation Anticipated car, drives self   Anticipated Changes Related to Illness none   Equipment Needed After Discharge none   Disability   Equipment Currently Used at Home none     Goal: Interprofessional Rounds/Family Conf  Outcome: Outcome(s) achieved Date Met: 10/04/18   10/04/18 1543   Interdisciplinary Rounds/Family Conf   Participants ;family;advanced practice nurse;nursing;physician;social work/services;pharmacy;patient       Problem: Cardiac: ACS (Acute Coronary Syndrome) (Adult)  Goal: Signs and Symptoms of Listed Potential Problems Will be Absent, Minimized or Managed (Cardiac: ACS)  Outcome: Outcome(s) achieved Date Met: 10/04/18   10/04/18 1543   Goal/Outcome Evaluation   Problems Assessed (Acute Coronary Syndrome) all   Problems Present (Acute Coronary Syn) none

## 2018-10-04 NOTE — DISCHARGE SUMMARY
[facility]  Physician Discharge Summary    Patient Identification:  Name: Chetan Grimm  Age: 52 y.o.  Sex: male  :  1965  MRN: [id number]    Admit date: 10/3/2018    Discharge date and time:   10/04/18    Admitting Physician: Bekah Ball MD     Discharge Physician: Dr. Ball  Discharge Diagnoses:   Patient Active Problem List   Diagnosis   • Diabetes mellitus (CMS/HCC)   • Hypersomnia with sleep apnea   • ANNEL on CPAP   • Hypercholesterolemia   • Primary hypothyroidism   • Hyperlipidemia   • Chronic pain   • Low testosterone   • Vitamin D deficiency   • Hypogonadism in male   • Coronary artery disease involving native heart with unstable angina pectoris (CMS/HCC)   • Obesity (BMI 30-39.9)   • Benign essential HTN   • Colitis   • Rectal bleeding   • Gastroesophageal reflux disease   • Hyperplastic colonic polyp   • Peripheral polyneuropathy   • Diarrhea   • Unilateral recurrent inguinal hernia without obstruction or gangrene   • Unstable angina (CMS/HCC)   • Status post insertion of drug eluting coronary artery stent       Discharged Condition: stable      Consults:   IP CONSULT TO INTERNAL MEDICINE    Discharge Exam:  General: No acute distress, resting   Skin: Warm and dry, no diaphoresis noted   EYES: PERTL   HEENT: external ear and nose normal; oral mucosa moist   Neck: Supple;   no JVD   Heart: S1S2 regular rate and rhythm; no murmurs; no gallop or rub appreciated   Pulmonary: Respirations regular, unlabored at rest, bilateral breath sounds have good air entry throughout all lung fields; no crackles, rubs or wheezes auscultated.     GI: Soft, non-tender, non-distended, positive bowel sounds  No hepatosplenomegaly   Extremities: Bilateral lower extremities have no pre-tibial pitting edema; DP/PT pulses are palpable   Neurological: Alert and oriented x 3; no neuro deficits          LABS:    Results from last 7 days  Lab Units 10/04/18  0200 10/03/18  1954 10/03/18  1520    TROPONIN T ng/mL <0.010 <0.010 <0.010           Results from last 7 days  Lab Units 10/04/18  0200   SODIUM mmol/L 140   POTASSIUM mmol/L 3.7   BUN mg/dL 15   CREATININE mg/dL 0.82   CALCIUM mg/dL 8.9     @[id number]@    Results from last 7 days  Lab Units 10/04/18  0200   WBC 10*3/mm3 7.77   HEMOGLOBIN g/dL 13.6*   HEMATOCRIT % 42.3   PLATELETS 10*3/mm3 217           Results from last 7 days  Lab Units 10/04/18  0200   CHOLESTEROL mg/dL 153   TRIGLYCERIDES mg/dL 220*   HDL CHOL mg/dL 29*   LDL CHOL mg/dL 80     Disposition:  Home    Discharge Medications:      Discharge Medications      Continue These Medications      Instructions Start Date   ACCU-CHEK FASTCLIX LANCETS misc   Does not apply, Use to test BG 2x daily       ACCU-CHEK GUIDE test strip  Generic drug:  glucose blood   Use to test BG 2x daily      ascorbic acid 1000 MG tablet  Commonly known as:  VITAMIN C   2,000 mg, Oral, 2 Times Daily      aspirin 81 MG chewable tablet   81 mg, Oral, Daily, TO HOLD 3 DAYS PRIOR TO OR      atorvastatin 40 MG tablet  Commonly known as:  LIPITOR   40 mg, Oral, Nightly      AXIRON 30 MG/ACT solution  Generic drug:  Testosterone   2 pumps under each arm daily      cyclobenzaprine 10 MG tablet  Commonly known as:  FLEXERIL   10 mg, Oral, 2 Times Daily PRN      EQ STOOL SOFTENER/LAXATIVE PO   100 mg, Oral, 2 Times Daily      fish oil 1000 MG capsule capsule   1,000 mg, Oral, Daily With Breakfast, HELD FOR OR      gabapentin 100 MG capsule  Commonly known as:  NEURONTIN   TAKE 1 CAPSULE BY MOUTH THREE TIMES DAILY      glipiZIDE 5 MG tablet  Commonly known as:  GLUCOTROL   5 mg, Oral, 2 Times Daily Before Meals      glucagon 1 MG injection  Commonly known as:  GLUCAGON EMERGENCY   1 mg, Subcutaneous, Once As Needed      IRON PO   65 mg, Oral, Daily      levothyroxine 125 MCG tablet  Commonly known as:  SYNTHROID   125 mcg, Oral, Daily      lisinopril 5 MG tablet  Commonly known as:  PRINIVIL,ZESTRIL   5 mg, Oral, Nightly     "  nitroglycerin 0.4 MG SL tablet  Commonly known as:  NITROSTAT   1 under the tongue as needed for angina, may repeat q5mins for up three doses      pantoprazole 40 MG EC tablet  Commonly known as:  PROTONIX   40 mg, Oral, 2 Times Daily      TANZEUM 50 MG pen-injector  Generic drug:  Albiglutide   50 mg, Subcutaneous, Weekly, TUESDAYS       TRESIBA FLEXTOUCH 100 UNIT/ML solution pen-injector injection  Generic drug:  insulin degludec   46 Units, Subcutaneous, Nightly      VITAMIN B-12 ER PO   2,500 mcg, Oral, Daily      vitamin D3 5000 units capsule capsule   5,000 Units, Oral, Daily      XIGDUO XR 5-1000 MG tablet  Generic drug:  dapagliflozin-metformin HCl ER   1 tablet, Oral, 2 Times Daily               Discharge Home Instructions:  1. Follow up with Dr. Ball on Oct. 22, 2018 at 1030 at the [place] office.  2. Return to ER for recurrent symptoms.  3. Continue home medications.      Signed:  Bekah Ball MD  10/4/2018  3:08 PM      EMR Dragon/Transcription:   \"Dictated utilizing Dragon dictation\".     "

## 2018-10-04 NOTE — PLAN OF CARE
Problem: Patient Care Overview  Goal: Plan of Care Review  Outcome: Ongoing (interventions implemented as appropriate)   10/04/18 0304   Coping/Psychosocial   Plan of Care Reviewed With patient   Plan of Care Review   Progress improving   OTHER   Outcome Summary No chest pain. Room Air. Vital Signs Stable. Stress Tomorrow. Will Continue to Monitor.

## 2018-10-08 LAB
25(OH)D3+25(OH)D2 SERPL-MCNC: 43.2 NG/ML (ref 30–100)
ALBUMIN SERPL-MCNC: 4.5 G/DL (ref 3.5–5.2)
ALBUMIN/GLOB SERPL: 1.7 G/DL
ALP SERPL-CCNC: 114 U/L (ref 39–117)
ALT SERPL-CCNC: 23 U/L (ref 1–41)
AST SERPL-CCNC: 14 U/L (ref 1–40)
BILIRUB SERPL-MCNC: 0.5 MG/DL (ref 0.1–1.2)
BUN SERPL-MCNC: 13 MG/DL (ref 6–20)
BUN/CREAT SERPL: 14.6 (ref 7–25)
C PEPTIDE SERPL-MCNC: 4.8 NG/ML (ref 1.1–4.4)
CALCIUM SERPL-MCNC: 9.5 MG/DL (ref 8.6–10.5)
CHLORIDE SERPL-SCNC: 103 MMOL/L (ref 98–107)
CHOLEST SERPL-MCNC: 188 MG/DL (ref 0–200)
CO2 SERPL-SCNC: 25.3 MMOL/L (ref 22–29)
CREAT SERPL-MCNC: 0.89 MG/DL (ref 0.76–1.27)
FT4I SERPL CALC-MCNC: 2.1 (ref 1.2–4.9)
GLOBULIN SER CALC-MCNC: 2.7 GM/DL
GLUCOSE SERPL-MCNC: 126 MG/DL (ref 65–99)
HBA1C MFR BLD: 7.74 % (ref 4.8–5.6)
HDLC SERPL-MCNC: 34 MG/DL (ref 40–60)
INTERPRETATION: NORMAL
LDLC SERPL CALC-MCNC: 98 MG/DL (ref 0–100)
Lab: NORMAL
MICROALBUMIN UR-MCNC: <3 UG/ML
POTASSIUM SERPL-SCNC: 4.3 MMOL/L (ref 3.5–5.2)
PROT SERPL-MCNC: 7.2 G/DL (ref 6–8.5)
SODIUM SERPL-SCNC: 143 MMOL/L (ref 136–145)
T3FREE SERPL-MCNC: 2.6 PG/ML (ref 2–4.4)
T3RU NFR SERPL: 28 % (ref 24–39)
T4 FREE SERPL-MCNC: 1.44 NG/DL (ref 0.93–1.7)
T4 SERPL-MCNC: 7.6 UG/DL (ref 4.5–12)
THYROGLOB AB SERPL-ACNC: 6.7 IU/ML
THYROGLOB SERPL-MCNC: <2 NG/ML
THYROGLOB SERPL-MCNC: ABNORMAL NG/ML
TRIGL SERPL-MCNC: 278 MG/DL (ref 0–150)
TSH SERPL DL<=0.005 MIU/L-ACNC: 1.92 UIU/ML (ref 0.45–4.5)
VLDLC SERPL CALC-MCNC: 55.6 MG/DL (ref 5–40)

## 2018-10-09 RX ORDER — GLIPIZIDE 5 MG/1
TABLET ORAL
Qty: 180 TABLET | Refills: 1 | Status: SHIPPED | OUTPATIENT
Start: 2018-10-09 | End: 2018-10-26 | Stop reason: HOSPADM

## 2018-10-11 ENCOUNTER — RESULTS ENCOUNTER (OUTPATIENT)
Dept: ENDOCRINOLOGY | Age: 53
End: 2018-10-11

## 2018-10-11 DIAGNOSIS — E11.69 TYPE 2 DIABETES MELLITUS WITH OTHER SPECIFIED COMPLICATION, WITH LONG-TERM CURRENT USE OF INSULIN (HCC): ICD-10-CM

## 2018-10-11 DIAGNOSIS — Z79.4 TYPE 2 DIABETES MELLITUS WITH OTHER SPECIFIED COMPLICATION, WITH LONG-TERM CURRENT USE OF INSULIN (HCC): ICD-10-CM

## 2018-10-11 DIAGNOSIS — E55.9 VITAMIN D DEFICIENCY: ICD-10-CM

## 2018-10-11 DIAGNOSIS — E29.1 HYPOGONADISM IN MALE: ICD-10-CM

## 2018-10-11 DIAGNOSIS — E03.9 PRIMARY HYPOTHYROIDISM: ICD-10-CM

## 2018-10-22 ENCOUNTER — OFFICE VISIT (OUTPATIENT)
Dept: CARDIOLOGY | Facility: CLINIC | Age: 53
End: 2018-10-22

## 2018-10-22 VITALS
SYSTOLIC BLOOD PRESSURE: 133 MMHG | BODY MASS INDEX: 36.7 KG/M2 | HEART RATE: 75 BPM | DIASTOLIC BLOOD PRESSURE: 83 MMHG | WEIGHT: 286 LBS | HEIGHT: 74 IN

## 2018-10-22 DIAGNOSIS — Z01.818 PREOPERATIVE CLEARANCE: ICD-10-CM

## 2018-10-22 DIAGNOSIS — R06.02 SHORTNESS OF BREATH: ICD-10-CM

## 2018-10-22 DIAGNOSIS — R07.2 PRECORDIAL PAIN: ICD-10-CM

## 2018-10-22 DIAGNOSIS — I20.0 UNSTABLE ANGINA PECTORIS (HCC): Primary | ICD-10-CM

## 2018-10-22 DIAGNOSIS — I25.110 ATHEROSCLEROSIS OF NATIVE CORONARY ARTERY OF NATIVE HEART WITH UNSTABLE ANGINA PECTORIS (HCC): ICD-10-CM

## 2018-10-22 PROCEDURE — 99213 OFFICE O/P EST LOW 20 MIN: CPT | Performed by: NURSE PRACTITIONER

## 2018-10-22 NOTE — PROGRESS NOTES
Subjective:       Chetan Grimm is a 52 y.o. male who here for follow up    CC: Hospital follow up visit.    HPI:  Chetan Grimm is a 52-year-old male with known history of coronary artery disease, hyperlipidemia and hypercholesterolemia with morbid obesity here for the cardiac evaluation as the patient has been complaining of the chest pains retrosternal mild-to-moderate radiating to the left arm with by shortness of breath.  He states since he has left the hospital two weeks ago he has had to take the nitroglycerin tabs daily and sometimes twice a day.  He states the nitroglycerin relieves the pain.          Problem List Items Addressed This Visit     Unstable angina pectoris (CMS/HCC) - Primary    Relevant Orders    Case Request Cath Lab: Left Heart Cath (Completed)      Other Visit Diagnoses     Precordial pain            .    The following portions of the patient's history were reviewed and updated as appropriate: allergies, current medications, past family history, past medical history, past social history, past surgical history and problem list.    Past Medical History:   Diagnosis Date   • Cervical disc disease with myelopathy    • Colon polyps    • Coronary artery disease    • Cryptococcal pneumonitis (CMS/HCC) 2010    TREATED WITH AMPHOTERACIN B   • Diabetes mellitus, type 2 (CMS/HCC)     TYPE 2   • Diabetic peripheral neuropathy (CMS/HCC)    • Exposure to SARS-associated coronavirus 2016   • GERD (gastroesophageal reflux disease)    • History of chest pain 2015   • History of colitis 2018   • History of DVT (deep vein thrombosis) 2009    RIGHT ARM    • Hyperlipidemia    • Hypothyroidism    • Inguinal hernia    • Iron deficiency anemia    • ANNEL on CPAP     WEAR CPAP   • Pneumonia, bacterial 04/2018   • Pulmonary nodule    • Rectus diastasis    • Testosterone deficiency    • TIA (transient ischemic attack) 07/13/2013    NO RESIDUAL   • Vitamin D deficiency     reports that he has never smoked. He has  never used smokeless tobacco. He reports that he does not drink alcohol or use drugs.  Family History   Problem Relation Age of Onset   • Heart disease Mother    • Hypertension Mother    • Hypertension Father    • Heart disease Father    • Cancer Brother    • Malig Hyperthermia Neg Hx        Review of Systems  Constitutional: No wt loss, fever, fatigue  Gastrointestinal: No nausea, abdominal pain  Behavioral/Psych: No insomnia or anxiety  Cardiovascular: chest pain/pressure with some shortness of air.  Objective:       Physical Exam  General appearance: NAD, conversant , morbidly obesity   Eyes: anicteric sclerae, moist conjunctivae without drainage   HENT: Atraumatic; oropharynx clear with moist mucous membranes    Neck: Trachea midline; FROM, supple   Lungs: CTA, with normal respiratory effort and no intercostal retractions   CV: S1-S2 regular, no murmurs, no rub, no gallop   Abdomen: Soft, non-tender   Extremities: No peripheral edema or extremity lymphadenopathy  Skin: Normal temperature, turgor and texture; no rash   Psych: Appropriate affect, alert and oriented to person, place and time     Cardiographics  @Procedures  Stress Test 10/04/18    Interpretation Summary     · Findings consistent with a normal ECG stress test.  · Left ventricular ejection fraction is normal (Calculated EF = 66%).  · Myocardial perfusion imaging indicates a normal myocardial perfusion study with no evidence of ischemia.  · Impressions are consistent with a low risk study.       Echocardiogram:  10/04/18      Interpretation Summary     · Left atrial cavity size is borderline dilated.  · Calculated EF = 62%.  · Trace-to-mild mitral valve regurgitation is present.  · There is no evidence of pericardial effusion            Current Outpatient Prescriptions:   •  ACCU-CHEK FASTCLIX LANCETS misc, Use to test BG 2x daily, Disp: , Rfl:   •  Albiglutide (TANZEUM) 50 MG pen-injector, Inject 50 mg under the skin into the appropriate area as  directed 1 (One) Time Per Week. TUESDAYS, Disp: , Rfl:   •  ascorbic acid (VITAMIN C) 1000 MG tablet, Take 2,000 mg by mouth 2 (Two) Times a Day., Disp: , Rfl:   •  aspirin 81 MG chewable tablet, Chew 81 mg Daily. TO HOLD 3 DAYS PRIOR TO OR, Disp: , Rfl:   •  atorvastatin (LIPITOR) 40 MG tablet, Take 40 mg by mouth Every Night., Disp: , Rfl:   •  AXIRON 30 MG/ACT solution, 2 pumps under each arm daily, Disp: 180 mL, Rfl: 0  •  Cholecalciferol (VITAMIN D3) 5000 units capsule capsule, Take 5,000 Units by mouth Daily., Disp: , Rfl:   •  Cyanocobalamin (VITAMIN B-12 ER PO), Take 2,500 mcg by mouth Daily., Disp: , Rfl:   •  cyclobenzaprine (FLEXERIL) 10 MG tablet, Take 10 mg by mouth 2 (two) times a day as needed., Disp: , Rfl:   •  dapagliflozin-metformin HCl ER (XIGDUO XR) 5-1000 MG tablet, Take 1 tablet by mouth 2 (Two) Times a Day., Disp: , Rfl:   •  gabapentin (NEURONTIN) 100 MG capsule, TAKE 1 CAPSULE BY MOUTH THREE TIMES DAILY, Disp: 90 capsule, Rfl: 1  •  glipiZIDE (GLUCOTROL) 5 MG tablet, Take 5 mg by mouth 2 (Two) Times a Day Before Meals., Disp: , Rfl:   •  glipiZIDE (GLUCOTROL) 5 MG tablet, TAKE 1 TABLET BY MOUTH TWICE DAILY BEFORE MEAL(S), Disp: 180 tablet, Rfl: 1  •  glucagon (GLUCAGON EMERGENCY) 1 MG injection, Inject 1 mg under the skin 1 (One) Time As Needed for low blood sugar for up to 1 dose., Disp: 1 kit, Rfl: 5  •  glucose blood (ACCU-CHEK GUIDE) test strip, Use to test BG 2x daily, Disp: , Rfl:   •  insulin degludec (TRESIBA FLEXTOUCH) 100 UNIT/ML solution pen-injector injection, Inject 46 Units under the skin into the appropriate area as directed Every Night., Disp: , Rfl:   •  IRON PO, Take 65 mg by mouth Daily., Disp: , Rfl:   •  levothyroxine (SYNTHROID) 125 MCG tablet, Take 1 tablet by mouth Daily., Disp: 30 tablet, Rfl: 11  •  lisinopril (PRINIVIL,ZESTRIL) 5 MG tablet, Take 5 mg by mouth every night., Disp: , Rfl:   •  nitroglycerin (NITROSTAT) 0.4 MG SL tablet, 1 under the tongue as needed  for angina, may repeat q5mins for up three doses, Disp: 100 tablet, Rfl: 11  •  Omega-3 Fatty Acids (FISH OIL) 1000 MG capsule capsule, Take 1,000 mg by mouth Daily With Breakfast. HELD FOR OR, Disp: , Rfl:   •  pantoprazole (PROTONIX) 40 MG EC tablet, Take 40 mg by mouth 2 (Two) Times a Day., Disp: , Rfl:   •  Sennosides-Docusate Sodium (EQ STOOL SOFTENER/LAXATIVE PO), Take 100 mg by mouth 2 (Two) Times a Day., Disp: , Rfl:   •  TANZEUM 50 MG pen-injector, INJECT ONE DOSE UNDER THE SKIN ONCE A WEEK, Disp: 12 each, Rfl: 1   Assessment:        Patient Active Problem List   Diagnosis   • Diabetes mellitus (CMS/HCC)   • Hypersomnia with sleep apnea   • ANNEL on CPAP   • Hypercholesterolemia   • Primary hypothyroidism   • Hyperlipidemia   • Chronic pain   • Low testosterone   • Vitamin D deficiency   • Hypogonadism in male   • Coronary artery disease involving native heart with unstable angina pectoris (CMS/HCC)   • Obesity (BMI 30-39.9)   • Benign essential HTN   • Colitis   • Rectal bleeding   • Gastroesophageal reflux disease   • Hyperplastic colonic polyp   • Peripheral polyneuropathy   • Diarrhea   • Unilateral recurrent inguinal hernia without obstruction or gangrene   • Unstable angina (CMS/HCC)   • Status post insertion of drug eluting coronary artery stent   • Unstable angina pectoris (CMS/HCC)               Plan:               ICD-10-CM ICD-9-CM   1. Unstable angina pectoris (CMS/HCC) I20.0 411.1   2. Precordial pain R07.2 786.51     3. Status post insertion of drug eluting coronary artery stent  4. HLD  5. Hypercholesterolemia  6. Obesity      1. Unstable angina pectoris: Cardiac Catheterization Friday    2. Precordial Pain: Cardiac Catheterization Friday    3.Coronary artery disease involving native heart with unstable angina pectoris:  Patient having recurrent chest pain. Cath Lab request    4. HLD:  counseling    5. Hypercholesterolemia    6. Obesity: counseling    Spoke to Dr. Ball regarding patient  to set up cardiac Catheterization for Friday.    COUNSELING:    Chetan Smith was given to patient for the following topics: diagnostic results, risk factor reductions, impressions, risks and benefits of treatment options and importance of treatment compliance .     Return to ER per EMS for any recurrent symptoms including, but not limited to: chest pain/pressure/tightness, weakness, Shortness of breath, dizziness, palpitations, near syncope or syncope          SMOKING COUNSELING:    Counseling given: Not Answered      EMR Dragon/Transcription disclaimer:   Much of this encounter note is an electronic transcription/translation of spoken language to printed text. The electronic translation of spoken language may permit erroneous, or at times, nonsensical words or phrases to be inadvertently transcribed; Although I have reviewed the note for such errors, some may still exist.

## 2018-10-25 ENCOUNTER — HOSPITAL ENCOUNTER (OUTPATIENT)
Dept: CARDIOLOGY | Facility: HOSPITAL | Age: 53
Discharge: HOME | End: 2018-10-25
Admitting: INTERNAL MEDICINE

## 2018-10-25 LAB
ANION GAP SERPL CALCULATED.3IONS-SCNC: 14.2 MMOL/L
APTT PPP: 25.9 SECONDS (ref 22.7–35.4)
BASOPHILS # BLD AUTO: 0.05 10*3/MM3 (ref 0–0.2)
BASOPHILS NFR BLD AUTO: 0.6 % (ref 0–1.5)
BUN BLD-MCNC: 13 MG/DL (ref 6–20)
BUN/CREAT SERPL: 12.6 (ref 7–25)
CALCIUM SPEC-SCNC: 9.5 MG/DL (ref 8.6–10.5)
CHLORIDE SERPL-SCNC: 103 MMOL/L (ref 98–107)
CO2 SERPL-SCNC: 21.8 MMOL/L (ref 22–29)
CREAT BLD-MCNC: 1.03 MG/DL (ref 0.76–1.27)
DEPRECATED RDW RBC AUTO: 45.7 FL (ref 37–54)
EOSINOPHIL # BLD AUTO: 0.49 10*3/MM3 (ref 0–0.7)
EOSINOPHIL NFR BLD AUTO: 6.3 % (ref 0.3–6.2)
ERYTHROCYTE [DISTWIDTH] IN BLOOD BY AUTOMATED COUNT: 13.3 % (ref 11.5–14.5)
GFR SERPL CREATININE-BSD FRML MDRD: 76 ML/MIN/1.73
GLUCOSE BLD-MCNC: 154 MG/DL (ref 65–99)
HCT VFR BLD AUTO: 48.8 % (ref 40.4–52.2)
HGB BLD-MCNC: 15.6 G/DL (ref 13.7–17.6)
IMM GRANULOCYTES # BLD: 0.04 10*3/MM3 (ref 0–0.03)
IMM GRANULOCYTES NFR BLD: 0.5 % (ref 0–0.5)
INR PPP: 1.02 (ref 0.9–1.1)
LYMPHOCYTES # BLD AUTO: 1.49 10*3/MM3 (ref 0.9–4.8)
LYMPHOCYTES NFR BLD AUTO: 19 % (ref 19.6–45.3)
MCH RBC QN AUTO: 30.2 PG (ref 27–32.7)
MCHC RBC AUTO-ENTMCNC: 32 G/DL (ref 32.6–36.4)
MCV RBC AUTO: 94.6 FL (ref 79.8–96.2)
MONOCYTES # BLD AUTO: 0.47 10*3/MM3 (ref 0.2–1.2)
MONOCYTES NFR BLD AUTO: 6 % (ref 5–12)
NEUTROPHILS # BLD AUTO: 5.3 10*3/MM3 (ref 1.9–8.1)
NEUTROPHILS NFR BLD AUTO: 67.6 % (ref 42.7–76)
PLATELET # BLD AUTO: 238 10*3/MM3 (ref 140–500)
PMV BLD AUTO: 10.8 FL (ref 6–12)
POTASSIUM BLD-SCNC: 4.3 MMOL/L (ref 3.5–5.2)
PROTHROMBIN TIME: 13.2 SECONDS (ref 11.7–14.2)
RBC # BLD AUTO: 5.16 10*6/MM3 (ref 4.6–6)
SODIUM BLD-SCNC: 139 MMOL/L (ref 136–145)
WBC NRBC COR # BLD: 7.84 10*3/MM3 (ref 4.5–10.7)

## 2018-10-25 PROCEDURE — 85025 COMPLETE CBC W/AUTO DIFF WBC: CPT | Performed by: NURSE PRACTITIONER

## 2018-10-25 PROCEDURE — 85610 PROTHROMBIN TIME: CPT | Performed by: NURSE PRACTITIONER

## 2018-10-25 PROCEDURE — 36415 COLL VENOUS BLD VENIPUNCTURE: CPT | Performed by: INTERNAL MEDICINE

## 2018-10-25 PROCEDURE — 85730 THROMBOPLASTIN TIME PARTIAL: CPT | Performed by: NURSE PRACTITIONER

## 2018-10-25 PROCEDURE — 80048 BASIC METABOLIC PNL TOTAL CA: CPT | Performed by: NURSE PRACTITIONER

## 2018-10-26 ENCOUNTER — HOSPITAL ENCOUNTER (OUTPATIENT)
Facility: HOSPITAL | Age: 53
Setting detail: HOSPITAL OUTPATIENT SURGERY
Discharge: HOME | End: 2018-10-26
Attending: INTERNAL MEDICINE | Admitting: INTERNAL MEDICINE

## 2018-10-26 VITALS
HEIGHT: 74 IN | OXYGEN SATURATION: 95 % | WEIGHT: 285 LBS | BODY MASS INDEX: 36.57 KG/M2 | SYSTOLIC BLOOD PRESSURE: 130 MMHG | DIASTOLIC BLOOD PRESSURE: 78 MMHG | HEART RATE: 74 BPM | RESPIRATION RATE: 16 BRPM | TEMPERATURE: 97.8 F

## 2018-10-26 DIAGNOSIS — I20.0 UNSTABLE ANGINA PECTORIS (HCC): ICD-10-CM

## 2018-10-26 LAB
ANION GAP SERPL CALCULATED.3IONS-SCNC: 14.5 MMOL/L
BUN BLD-MCNC: 13 MG/DL (ref 6–20)
BUN/CREAT SERPL: 14.4 (ref 7–25)
CALCIUM SPEC-SCNC: 8.8 MG/DL (ref 8.6–10.5)
CHLORIDE SERPL-SCNC: 103 MMOL/L (ref 98–107)
CO2 SERPL-SCNC: 23.5 MMOL/L (ref 22–29)
CREAT BLD-MCNC: 0.9 MG/DL (ref 0.76–1.27)
DEPRECATED RDW RBC AUTO: 44.7 FL (ref 37–54)
ERYTHROCYTE [DISTWIDTH] IN BLOOD BY AUTOMATED COUNT: 13.2 % (ref 11.5–14.5)
GFR SERPL CREATININE-BSD FRML MDRD: 89 ML/MIN/1.73
GLUCOSE BLD-MCNC: 141 MG/DL (ref 65–99)
GLUCOSE BLDC GLUCOMTR-MCNC: 122 MG/DL (ref 70–130)
HCT VFR BLD AUTO: 45.2 % (ref 40.4–52.2)
HGB BLD-MCNC: 14.4 G/DL (ref 13.7–17.6)
MCH RBC QN AUTO: 29.8 PG (ref 27–32.7)
MCHC RBC AUTO-ENTMCNC: 31.9 G/DL (ref 32.6–36.4)
MCV RBC AUTO: 93.6 FL (ref 79.8–96.2)
PLATELET # BLD AUTO: 221 10*3/MM3 (ref 140–500)
PMV BLD AUTO: 10.5 FL (ref 6–12)
POTASSIUM BLD-SCNC: 3.9 MMOL/L (ref 3.5–5.2)
RBC # BLD AUTO: 4.83 10*6/MM3 (ref 4.6–6)
SODIUM BLD-SCNC: 141 MMOL/L (ref 136–145)
WBC NRBC COR # BLD: 7.36 10*3/MM3 (ref 4.5–10.7)

## 2018-10-26 PROCEDURE — 93458 L HRT ARTERY/VENTRICLE ANGIO: CPT | Performed by: INTERNAL MEDICINE

## 2018-10-26 PROCEDURE — C1894 INTRO/SHEATH, NON-LASER: HCPCS | Performed by: INTERNAL MEDICINE

## 2018-10-26 PROCEDURE — C1887 CATHETER, GUIDING: HCPCS | Performed by: INTERNAL MEDICINE

## 2018-10-26 PROCEDURE — C1769 GUIDE WIRE: HCPCS | Performed by: INTERNAL MEDICINE

## 2018-10-26 PROCEDURE — 25010000002 MIDAZOLAM PER 1 MG: Performed by: INTERNAL MEDICINE

## 2018-10-26 PROCEDURE — 93005 ELECTROCARDIOGRAM TRACING: CPT | Performed by: INTERNAL MEDICINE

## 2018-10-26 PROCEDURE — C1725 CATH, TRANSLUMIN NON-LASER: HCPCS | Performed by: INTERNAL MEDICINE

## 2018-10-26 PROCEDURE — 25010000002 FENTANYL CITRATE (PF) 100 MCG/2ML SOLUTION: Performed by: INTERNAL MEDICINE

## 2018-10-26 PROCEDURE — 82962 GLUCOSE BLOOD TEST: CPT

## 2018-10-26 PROCEDURE — 80048 BASIC METABOLIC PNL TOTAL CA: CPT | Performed by: INTERNAL MEDICINE

## 2018-10-26 PROCEDURE — 99152 MOD SED SAME PHYS/QHP 5/>YRS: CPT | Performed by: INTERNAL MEDICINE

## 2018-10-26 PROCEDURE — 85347 COAGULATION TIME ACTIVATED: CPT

## 2018-10-26 PROCEDURE — 92920 PRQ TRLUML C ANGIOP 1ART&/BR: CPT | Performed by: INTERNAL MEDICINE

## 2018-10-26 PROCEDURE — 0 IOPAMIDOL PER 1 ML: Performed by: INTERNAL MEDICINE

## 2018-10-26 PROCEDURE — 85027 COMPLETE CBC AUTOMATED: CPT | Performed by: INTERNAL MEDICINE

## 2018-10-26 PROCEDURE — 93010 ELECTROCARDIOGRAM REPORT: CPT | Performed by: INTERNAL MEDICINE

## 2018-10-26 PROCEDURE — 25010000002 HEPARIN (PORCINE) PER 1000 UNITS: Performed by: INTERNAL MEDICINE

## 2018-10-26 PROCEDURE — 99153 MOD SED SAME PHYS/QHP EA: CPT | Performed by: INTERNAL MEDICINE

## 2018-10-26 RX ORDER — MIDAZOLAM HYDROCHLORIDE 1 MG/ML
INJECTION INTRAMUSCULAR; INTRAVENOUS AS NEEDED
Status: DISCONTINUED | OUTPATIENT
Start: 2018-10-26 | End: 2018-10-26 | Stop reason: HOSPADM

## 2018-10-26 RX ORDER — HEPARIN SODIUM 1000 [USP'U]/ML
INJECTION, SOLUTION INTRAVENOUS; SUBCUTANEOUS AS NEEDED
Status: DISCONTINUED | OUTPATIENT
Start: 2018-10-26 | End: 2018-10-26 | Stop reason: HOSPADM

## 2018-10-26 RX ORDER — SODIUM CHLORIDE 0.9 % (FLUSH) 0.9 %
3 SYRINGE (ML) INJECTION EVERY 12 HOURS SCHEDULED
Status: DISCONTINUED | OUTPATIENT
Start: 2018-10-26 | End: 2018-10-26 | Stop reason: HOSPADM

## 2018-10-26 RX ORDER — SODIUM CHLORIDE 9 MG/ML
75 INJECTION, SOLUTION INTRAVENOUS CONTINUOUS
Status: DISCONTINUED | OUTPATIENT
Start: 2018-10-26 | End: 2018-10-26 | Stop reason: HOSPADM

## 2018-10-26 RX ORDER — FENTANYL CITRATE 50 UG/ML
INJECTION, SOLUTION INTRAMUSCULAR; INTRAVENOUS AS NEEDED
Status: DISCONTINUED | OUTPATIENT
Start: 2018-10-26 | End: 2018-10-26 | Stop reason: HOSPADM

## 2018-10-26 RX ORDER — SODIUM CHLORIDE 9 MG/ML
100 INJECTION, SOLUTION INTRAVENOUS CONTINUOUS
Status: DISCONTINUED | OUTPATIENT
Start: 2018-10-26 | End: 2018-10-26 | Stop reason: HOSPADM

## 2018-10-26 RX ORDER — LIDOCAINE HYDROCHLORIDE 20 MG/ML
INJECTION, SOLUTION INFILTRATION; PERINEURAL AS NEEDED
Status: DISCONTINUED | OUTPATIENT
Start: 2018-10-26 | End: 2018-10-26 | Stop reason: HOSPADM

## 2018-10-26 RX ORDER — LIDOCAINE HYDROCHLORIDE 10 MG/ML
0.1 INJECTION, SOLUTION EPIDURAL; INFILTRATION; INTRACAUDAL; PERINEURAL ONCE AS NEEDED
Status: DISCONTINUED | OUTPATIENT
Start: 2018-10-26 | End: 2018-10-26 | Stop reason: HOSPADM

## 2018-10-26 RX ORDER — SODIUM CHLORIDE 0.9 % (FLUSH) 0.9 %
3-10 SYRINGE (ML) INJECTION AS NEEDED
Status: DISCONTINUED | OUTPATIENT
Start: 2018-10-26 | End: 2018-10-26 | Stop reason: HOSPADM

## 2018-10-26 RX ORDER — ASPIRIN 81 MG/1
TABLET, CHEWABLE ORAL AS NEEDED
Status: DISCONTINUED | OUTPATIENT
Start: 2018-10-26 | End: 2018-10-26 | Stop reason: HOSPADM

## 2018-10-26 RX ADMIN — SODIUM CHLORIDE 75 ML/HR: 9 INJECTION, SOLUTION INTRAVENOUS at 08:09

## 2018-10-28 LAB — ACT BLD: 279 SECONDS (ref 82–152)

## 2018-10-29 DIAGNOSIS — I10 BENIGN ESSENTIAL HTN: ICD-10-CM

## 2018-10-29 DIAGNOSIS — E78.00 HYPERCHOLESTEROLEMIA: ICD-10-CM

## 2018-10-29 DIAGNOSIS — Z79.4 TYPE 2 DIABETES MELLITUS WITH OTHER SPECIFIED COMPLICATION, WITH LONG-TERM CURRENT USE OF INSULIN (HCC): ICD-10-CM

## 2018-10-29 DIAGNOSIS — E55.9 VITAMIN D DEFICIENCY: ICD-10-CM

## 2018-10-29 DIAGNOSIS — E03.9 PRIMARY HYPOTHYROIDISM: ICD-10-CM

## 2018-10-29 DIAGNOSIS — E29.1 HYPOGONADISM IN MALE: ICD-10-CM

## 2018-10-29 DIAGNOSIS — E11.69 TYPE 2 DIABETES MELLITUS WITH OTHER SPECIFIED COMPLICATION, WITH LONG-TERM CURRENT USE OF INSULIN (HCC): ICD-10-CM

## 2018-10-29 RX ORDER — BLOOD SUGAR DIAGNOSTIC
STRIP MISCELLANEOUS
Qty: 400 EACH | Refills: 0 | Status: SHIPPED | OUTPATIENT
Start: 2018-10-29

## 2018-11-05 ENCOUNTER — TELEPHONE (OUTPATIENT)
Dept: ENDOCRINOLOGY | Age: 53
End: 2018-11-05

## 2018-11-05 NOTE — TELEPHONE ENCOUNTER
----- Message from DEREK Kapoor sent at 11/2/2018  5:37 PM EDT -----  Change to trulicity 1.5 mg weekly  ----- Message -----  From: Parisa Chadwick MA  Sent: 11/2/2018   4:22 PM  To: DEREK Kapoor    Pt tanzeum on back order. Please advise.           Left patient message informing him the trulicity is being sent in place of tanzeum due to the pharmacy sending a notice that tanzeum is on back order. Told the patient to call the office with any questions or concerns.

## 2018-11-19 RX ORDER — GABAPENTIN 100 MG/1
CAPSULE ORAL
Qty: 90 CAPSULE | Refills: 0 | OUTPATIENT
Start: 2018-11-19 | End: 2018-12-21 | Stop reason: SDUPTHER

## 2018-11-19 RX ORDER — DAPAGLIFLOZIN AND METFORMIN HYDROCHLORIDE 5; 1000 MG/1; MG/1
TABLET, FILM COATED, EXTENDED RELEASE ORAL
Qty: 60 TABLET | Refills: 4 | Status: SHIPPED | OUTPATIENT
Start: 2018-11-19

## 2018-12-21 DIAGNOSIS — K21.9 GASTROESOPHAGEAL REFLUX DISEASE, ESOPHAGITIS PRESENCE NOT SPECIFIED: ICD-10-CM

## 2018-12-21 RX ORDER — LEVOTHYROXINE SODIUM 0.12 MG/1
TABLET ORAL
Qty: 90 TABLET | Refills: 0 | Status: SHIPPED | OUTPATIENT
Start: 2018-12-21 | End: 2019-03-20 | Stop reason: SDUPTHER

## 2018-12-21 RX ORDER — GABAPENTIN 100 MG/1
CAPSULE ORAL
Qty: 90 CAPSULE | Refills: 0 | Status: SHIPPED | OUTPATIENT
Start: 2018-12-21 | End: 2019-02-01 | Stop reason: SDUPTHER

## 2018-12-26 RX ORDER — PANTOPRAZOLE SODIUM 40 MG/1
TABLET, DELAYED RELEASE ORAL
Qty: 180 TABLET | Refills: 0 | Status: SHIPPED | OUTPATIENT
Start: 2018-12-26

## 2019-01-31 RX ORDER — GABAPENTIN 100 MG/1
CAPSULE ORAL
Qty: 90 CAPSULE | Refills: 0 | Status: CANCELLED | OUTPATIENT
Start: 2019-01-31

## 2019-02-01 RX ORDER — GABAPENTIN 100 MG/1
100 CAPSULE ORAL 3 TIMES DAILY
Qty: 90 CAPSULE | Refills: 0 | Status: SHIPPED | OUTPATIENT
Start: 2019-02-01

## 2019-03-13 DIAGNOSIS — E29.1 HYPOGONADISM IN MALE: ICD-10-CM

## 2019-03-13 DIAGNOSIS — E11.69 TYPE 2 DIABETES MELLITUS WITH OTHER SPECIFIED COMPLICATION, WITH LONG-TERM CURRENT USE OF INSULIN (HCC): ICD-10-CM

## 2019-03-13 DIAGNOSIS — Z79.4 TYPE 2 DIABETES MELLITUS WITH OTHER SPECIFIED COMPLICATION, WITH LONG-TERM CURRENT USE OF INSULIN (HCC): ICD-10-CM

## 2019-03-13 DIAGNOSIS — E78.5 HYPERLIPIDEMIA, UNSPECIFIED HYPERLIPIDEMIA TYPE: Primary | ICD-10-CM

## 2019-03-13 DIAGNOSIS — E03.9 PRIMARY HYPOTHYROIDISM: ICD-10-CM

## 2019-03-20 RX ORDER — GLIPIZIDE 5 MG/1
5 TABLET ORAL
Qty: 180 TABLET | Refills: 0 | Status: SHIPPED | OUTPATIENT
Start: 2019-03-20

## 2019-03-20 RX ORDER — LEVOTHYROXINE SODIUM 0.12 MG/1
125 TABLET ORAL DAILY
Qty: 90 TABLET | Refills: 0 | Status: SHIPPED | OUTPATIENT
Start: 2019-03-20

## 2019-04-02 RX ORDER — GABAPENTIN 100 MG/1
CAPSULE ORAL
Qty: 90 CAPSULE | Refills: 0 | OUTPATIENT
Start: 2019-04-02

## 2019-04-13 ENCOUNTER — RESULTS ENCOUNTER (OUTPATIENT)
Dept: ENDOCRINOLOGY | Age: 54
End: 2019-04-13

## 2019-04-13 DIAGNOSIS — Z79.4 TYPE 2 DIABETES MELLITUS WITH OTHER SPECIFIED COMPLICATION, WITH LONG-TERM CURRENT USE OF INSULIN (HCC): ICD-10-CM

## 2019-04-13 DIAGNOSIS — E11.69 TYPE 2 DIABETES MELLITUS WITH OTHER SPECIFIED COMPLICATION, WITH LONG-TERM CURRENT USE OF INSULIN (HCC): ICD-10-CM

## 2019-04-13 DIAGNOSIS — E29.1 HYPOGONADISM IN MALE: ICD-10-CM

## 2019-04-13 DIAGNOSIS — E78.5 HYPERLIPIDEMIA, UNSPECIFIED HYPERLIPIDEMIA TYPE: ICD-10-CM

## 2019-04-13 DIAGNOSIS — E03.9 PRIMARY HYPOTHYROIDISM: ICD-10-CM

## 2019-04-15 ENCOUNTER — APPOINTMENT (OUTPATIENT)
Dept: SLEEP MEDICINE | Facility: HOSPITAL | Age: 54
End: 2019-04-15
Attending: INTERNAL MEDICINE

## 2020-01-17 RX ORDER — GABAPENTIN 100 MG/1
CAPSULE ORAL
Qty: 90 CAPSULE | Refills: 0 | OUTPATIENT
Start: 2020-01-17

## 2021-04-21 NOTE — PLAN OF CARE
Problem: Patient Care Overview  Goal: Plan of Care Review  Outcome: Ongoing (interventions implemented as appropriate)   08/18/18 0403   Coping/Psychosocial   Plan of Care Reviewed With patient   Plan of Care Review   Progress improving   OTHER   Outcome Summary pt has c/o pain managed with prn pain meds, nausea medicated with prn zofran, no s/s of distress noted will continue to monitor at 0404 8/18/18       Problem: Pain, Acute (Adult)  Goal: Acceptable Pain Control/Comfort Level  Outcome: Ongoing (interventions implemented as appropriate)         medial meniscus tear. The MRI did not confirm degenerative changes throughout the knee as well. Due to His symptoms and findings the patient was ultimately cleared and scheduled for left knee arthroscopy. OPERATIVE REPORT: The patient was identified preoperatively. The patient   was then taken to the operating room and general anesthesia was administered   by Anesthesia. A nonsterile tourniquet was applied to the left thigh. The   left lower extremity was then prepped in a standard fashion with ChloraPrep. We then draped out in a standard fashion. We elevated the tourniquet to 300   mmHg. Appropriate preoperative antibiotics were then administered. We then   began diagnostic arthroscopy with the anterolateral portal. We viewed the   suprapatellar pouch; there were no loose bodies. We then viewed the medial   and lateral gutters; there were no loose bodies. The patellofemoral joint was visualized and the patella tracked rather well. There was II chondromalacia. We then passed our scope medially and a medial synovial plica was present. We then passed our scope into the medial compartment and started an anteromedial working portal. There was grade III chondromalacia involving the medial compartment. We probed the medial meniscus and noted a complex tear involving the body and posterior horn of the medial meniscus. The tear appeared chronic and was not repairable. We used our upbiters to debride the medial meniscus. We then used our arthroscopic shaver to contour the meniscus to a stable rim. The lateral compartment was then evaluated. There was no chondromalacia. We did use our arthroscopic shaver and gently performed chondroplasty to the medial femoral condyle. The lateral meniscus was intact and stable to probing. There was not evidence of a lateral meniscus tear. The scope was passed into the patellofemoral joint and the medial plica was completely excised using the arthroscopic shaver.  The tissue was no longer catching on the medial femoral condyle. We then passed our scope into the notch and the ACL was visualized and intact. The PCL insertion was identified and intact. We used our arthroscopic shaver and this worked out rather well. We then went ahead and expressed all fluid out of the knee. We injected the knee with a Marcaine/morphine/epinephrine solution. We closed our portal sites with interrupted simple 4-0 nylon stitches. Sterile dressings were applied. There were no complications.        Electronically signed by Tian Fraire MD on 4/21/2021 at 1:01 PM

## 2021-06-29 NOTE — TELEPHONE ENCOUNTER
I attempted to call Dr. Jennings office this afternoon and was unable to do so. Per Domonique, she had me call the patient and increase the glipizide to 2 tablets daily. I let the patient know and also asked that he call in BG readings in about a week and half.   
verbal cues/1 person assist

## 2022-01-27 ENCOUNTER — TELEPHONE (OUTPATIENT)
Dept: GASTROENTEROLOGY | Facility: CLINIC | Age: 57
End: 2022-01-27

## 2022-01-27 NOTE — TELEPHONE ENCOUNTER
Called patient number to update demographics and number is wrong in the system. Screening is undeliverable due to commercial receiving agency. No Authorization to receive mail for the address listed.

## (undated) DEVICE — THE TORRENT IRRIGATION SCOPE CONNECTOR IS USED WITH THE TORRENT IRRIGATION TUBING TO PROVIDE IRRIGATION FLUIDS SUCH AS STERILE WATER DURING GASTROINTESTINAL ENDOSCOPIC PROCEDURES WHEN USED IN CONJUNCTION WITH AN IRRIGATION PUMP (OR ELECTROSURGICAL UNIT).: Brand: TORRENT

## (undated) DEVICE — PK CATH CARD 40

## (undated) DEVICE — ELECTRD BLD EDGE/INSUL1P 2.4X5.1MM STRL

## (undated) DEVICE — HI-TORQUE BALANCE MIDDLEWEIGHT GUIDE WIRE .014 STRAIGHT TIP 3.0 CM X 190 CM: Brand: HI-TORQUE BALANCE MIDDLEWEIGHT

## (undated) DEVICE — TBG 02 CRUSH RESIST LF CLR 7FT

## (undated) DEVICE — DEV INDEFLATOR

## (undated) DEVICE — GW EMR FIX EXCHG J STD .035 3MM 260CM

## (undated) DEVICE — GLIDESHEATH BASIC HYDROPHILIC COATED INTRODUCER SHEATH: Brand: GLIDESHEATH

## (undated) DEVICE — ENCORE® LATEX ORTHO SIZE 7.5, STERILE LATEX POWDER-FREE SURGICAL GLOVE: Brand: ENCORE

## (undated) DEVICE — DRN PENRS 5/8X18IN LTX

## (undated) DEVICE — SUT VIC 5/0 PS2 18IN J495H

## (undated) DEVICE — CANN NASL CO2 TRULINK W/O2 A/

## (undated) DEVICE — FRCP BX RADJAW4 NDL 2.8 240CM LG OG BX40

## (undated) DEVICE — BITEBLOCK OMNI BLOC

## (undated) DEVICE — TUBING, SUCTION, 1/4" X 10', STRAIGHT: Brand: MEDLINE

## (undated) DEVICE — PK PROC MINOR TOWER 40

## (undated) DEVICE — Device: Brand: DEFENDO AIR/WATER/SUCTION AND BIOPSY VALVE

## (undated) DEVICE — SUT ETHIB 0/0 MO6 I8IN CX45D

## (undated) DEVICE — SUT VIC 3/0 SH 27IN J416H

## (undated) DEVICE — APPL CHLORAPREP W/TINT 26ML ORNG

## (undated) DEVICE — 6F .070 XB 3.5 100CM: Brand: VISTA BRITE TIP

## (undated) DEVICE — ADHS SKIN DERMABOND TOP ADVANCED

## (undated) DEVICE — KT MANIFLD CARDIAC

## (undated) DEVICE — STPLR SKIN VISISTAT WD 35CT

## (undated) DEVICE — TREK CORONARY DILATATION CATHETER 2.25 MM X 15 MM / RAPID-EXCHANGE: Brand: TREK

## (undated) DEVICE — SUT VIC 3/0 TIES 18IN J110T

## (undated) DEVICE — CATH DIAG IMPULSE FR4 5F 100CM

## (undated) DEVICE — CATH VENT MIV RADL PIG ST TIP 4F 110CM

## (undated) DEVICE — BND PRESS RADL COMFRT 14IN STRL

## (undated) DEVICE — CATH DIAG IMPULSE FL3.5 5F 100CM

## (undated) DEVICE — SPNG LAP 18X18IN LF STRL PK/5